# Patient Record
Sex: FEMALE | Employment: FULL TIME | URBAN - METROPOLITAN AREA
[De-identification: names, ages, dates, MRNs, and addresses within clinical notes are randomized per-mention and may not be internally consistent; named-entity substitution may affect disease eponyms.]

---

## 2017-05-08 ENCOUNTER — OFFICE VISIT (OUTPATIENT)
Dept: DERMATOLOGY | Facility: CLINIC | Age: 32
End: 2017-05-08

## 2017-05-08 VITALS — HEART RATE: 66 BPM | SYSTOLIC BLOOD PRESSURE: 123 MMHG | DIASTOLIC BLOOD PRESSURE: 81 MMHG

## 2017-05-08 DIAGNOSIS — L29.9 PRURITUS: ICD-10-CM

## 2017-05-08 DIAGNOSIS — L30.9 DERMATITIS: Primary | ICD-10-CM

## 2017-05-08 ASSESSMENT — PAIN SCALES - GENERAL: PAINLEVEL: NO PAIN (0)

## 2017-05-08 NOTE — MR AVS SNAPSHOT
After Visit Summary   5/8/2017    Susan Locke    MRN: [id number]           Patient Information     Date Of Birth          1985        Visit Information        Provider Department      5/8/2017 2:20 PM Roberta Dang MD [facility] Dermatology        Care Instructions    Wound Care After a Biopsy    What is a skin biopsy?  A skin biopsy allows the doctor to examine a very small piece of tissue under the microscope to determine the diagnosis and the best treatment for the skin condition. A local anesthetic (numbing medicine)  is injected with a very small needle into the skin area to be tested. A small piece of skin is taken from the area. Sometimes a suture (stitch) is used.     What are the risks of a skin biopsy?  I will experience scar, bleeding, swelling, pain, crusting and redness. I may experience incomplete removal or recurrence. Risks of this procedure are excessive bleeding, bruising, infection, nerve damage, numbness, thick (hypertrophic or keloidal) scar and non-diagnostic biopsy.    How should I care for my wound for the first 24 hours?    Keep the wound dry and covered for 24 hours    If it bleeds, hold direct pressure on the area for 15 minutes. If bleeding does not stop then go to the emergency room    Avoid strenuous exercise the first 1-2 days or as your doctor instructs you    How should I care for the wound after 24 hours?    After 24 hours, remove the bandage    You may bathe or shower as normal    If you had a scalp biopsy, you can shampoo as usual and can use shower water to clean the biopsy site daily    Clean the wound twice a day with gentle soap and water    Do not scrub, be gentle    Apply white petroleum/Vaseline after cleaning the wound with a cotton swab or a clean finger, and keep the site covered with a Bandaid /bandage. Bandages are not necessary with a scalp biopsy    If you are unable to cover the site with a Bandaid /bandage, re-apply ointment 2-3  times a day to keep the site moist. Moisture will help with healing    Avoid strenuous activity for first 1-2 days    Avoid lakes, rivers, pools, and oceans until the stitches are removed or the site is healed    How do I clean my wound?    Wash hands for 15 minutes with soap or use hand  before all wound care    Clean the wound with gentle soap and water    Apply white petroleum/Vaseline  to wound after it is clean    Replace the Bandaid /bandage to keep the wound covered for the first few days or as instructed by your doctor    If you had a scalp biopsy, warm shower water to the area on a daily basis should suffice    What should I use to clean my wound?     Cotton-tipped applicators (Qtips )    White petroleum jelly (Vaseline ). Use a clean new container and use Q-tips to apply.    Bandaids   as needed    Gentle soap     How should I care for my wound long term?    Do not get your wound dirty    Keep up with wound care for one week or until the area is healed.    A small scab will form and fall off by itself when the area is completely healed. The area will be red and will become pink in color as it heals. Sun protection is very important for how your scar will turn out. Sunscreen with an SPF 30 or greater is recommended once the area is healed.    If you have stitches, stitches need to be removed in 10 days. You may return to our clinic for this or you may have it done locally at your doctor s office.    You should have some soreness but it should be mild and slowly go away over several days. Talk to your doctor about using tylenol for pain,    When should I call my doctor?  If you have increased:     Pain or swelling    Pus or drainage (clear or slightly yellow drainage is ok)    Temperature over 100F    Spreading redness or warmth around wound    When will I hear about my results?  The biopsy results can take 2-3 weeks to come back. The clinic will call you with the results, send you a mychart  message, or have you schedule a follow-up clinic or phone time to discuss the results. Contact our clinics if you do not hear from us in 3 weeks.     Who should I call with questions?    [facility]: [phone]     [facility]: [phone]    For urgent needs outside of business hours call the [facility] at [phone] and ask for the dermatology resident on call              Follow-ups after your visit        Your next 10 appointments already scheduled     May 19, 2017 10:00 AM CDT   Nurse Visit with  Dermatology Nurse   [facility] Dermatology ([facility])    [address] [place] [id number] [phone]            Rj 15, 2017 10:00 AM CDT   (Arrive by 9:45 AM)   Return Visit with Sheryl Meza MD   [facility] Dermatology ([facility])    [address] [place] [id number] [phone]              Who to contact     Please call your clinic at [phone] to:    Ask questions about your health    Make or cancel appointments    Discuss your medicines    Learn about your test results    Speak to your doctor   If you have compliments or concerns about an experience at your clinic, or if you wish to file a complaint, please contact [facility] Patient Relations at [phone] or email us at [email or web address]         Additional Information About Your Visit        Bangeehart Information     Haversackt gives you secure access to your electronic health record. If you see a primary care provider, you can also send messages to your care team and make appointments. If you have questions, please call your primary care clinic.  If you do not have a primary care provider, please call [phone] and they will assist you.      Equ[employer] is an electronic gateway that provides easy, online access to your medical records.  With Prelert, you can request a clinic appointment, read your test results, renew a prescription or communicate with your care team.     To access your existing account, please contact your [facility] or call [phone] for assistance.        Care EveryWhere ID     This is your Care EveryWhere ID. This could be used by other organizations to access your [place] medical records  [id number]        Your Vitals Were     Pulse                   66            Blood Pressure from Last 3 Encounters:   05/08/17 123/81    Weight from Last 3 Encounters:   No data found for Wt              Today, you had the following     No orders found for display       Primary Care Provider    None Specified       No primary provider on file.        Thank you!     Thank you for choosing [facility] DERMATOLOGY  for your care. Our goal is always to provide you with excellent care. Hearing back from our patients is one way we can continue to improve our services. Please take a few minutes to complete the written survey that you may receive in the mail after your visit with us. Thank you!             Your Updated Medication List - Protect others around you: Learn how to safely use, store and throw away your medicines at www.disposemymeds.org.      Notice  As of 5/8/2017  3:53 PM    You have not been prescribed any medications.

## 2017-05-08 NOTE — NURSING NOTE
Chief Complaint   Patient presents with     Derm Problem     Susan is here today for a check on some itchy dry skin all around her mid section area. Had one spot for a couple years that has since mutiplied into many more areas.     Jeffrey Harris, NICOLAN

## 2017-05-08 NOTE — LETTER
5/8/2017       RE: Susan Locke  [address]  [place] [address]     Dear Colleague,    Thank you for referring your patient, Susan Locke, to the [facility] DERMATOLOG[facility] at [facility]. Please see a copy of my visit note below.    [facility] Dermatology Note      Dermatology Problem List:  1.Dermatitis - etiology unknown  - Considering nummular dermatitis, atopic dermatitis, allergic contact dermatitis, other.  - Biopsy pending 05/08/17.    Encounter Date: May 8, 2017    CC:  Chief Complaint   Patient presents with     Derm Problem     Susan is here today for a check on some itchy dry skin all around her mid section area. Had one spot for a couple years that has since mutiplied into many more areas.         History of Present Illness:  Ms. Susan Locke is a 32 year old female with a history of seasonal allergies who presents to clinic accompanied by her  for evaluation of a 1 year history of pruritic rash. She reports that this started as an isolated lesion of the right flank, described as an oval, pruritic patch. She has applied Aveeno lotion to the area without sufficient relief. At onset, the patient presented to an outside Dermatologist in [facility] (where the patient previously lived) and she was given an unknown topical steroid which appeared to make her erythema and itching worse. Since then, she has gradually developed more oval lesions, ~7 on the right flank that are fairly clustered, 1 on the left flank and 1 on the lower back. The lesions are not tender or painful. Since onset, the patient has been otherwise healthy.    Past Medical History:   There is no problem list on file for this patient.    Past Medical History:   Diagnosis Date     Arthritis      Hay fever      Rheumatism      History reviewed. No pertinent surgical history.    Social History:  The patient works from home in . Recently moved  from [facility] in the past 3 months.     Family History:  Noncontributory.    Medications:  No current outpatient prescriptions on file.     No Known Allergies      Review of Systems:  -Constitutional: The patient denies fatigue, fevers, chills, unintended weight loss, and night sweats.  -HEENT: Patient denies nonhealing oral sores.  -Skin: As above in HPI. No additional skin concerns.    Physical exam:  Vitals: /81 (BP Location: Right arm, Patient Position: Chair, Cuff Size: Adult Regular)  Pulse 66  GEN: This is a well developed, well-nourished female in no acute distress, in a pleasant mood.    SKIN: Focused examination of the trunk was performed.  -On the right flank, there are multiple ~2cm by 3cm poorly demarcated oval skin colored plaques that are slightly darker than the surrounding skin and lichenified with rough texture. Fine overlying scale. There is a singular, similar lesion on the left flank that is slightly more pink in color and measures ~1 by 2 cm. On the lower back, there is a singular ~2 by 3 cm rough, skin colored, plaque without overlying scale.  -No other lesions of concern on areas examined.     Impression/Plan:  1. Dermatitis: Differential includes: nummular dermatitis, atopic dermatitis, allergic contact dermatitis. Etiology is unclear at this time.     Further evaluation with Punch biopsy(with medical student participation):  Site anesthetized with local lidocaine with epinephrine. 4mm punch biopsy obtained from the central portion of the left flank lesion. Placed in formalin immediately. Closed with 1 figure-8, 4-0 prolene suture and 1 simple interrupted suture.  Wound care reviewed.    Follow-up pending biopsy results.         Staff Involved:  Scribed by Indira Shaffer, MS4 for Dr. Dang.        I agree with the PFSH and ROS as completed by the Medical Student. The remainder of the encounter was performed by me and scribed by the Medical Student. The scribed note  accurately reflects my personal services and the medical decisions made by me. The biopsy procedure was done together.       Roberta Dang MD  Professor and Chair  Department of Dermatology  [facility]                Pictures taken of patient today to be placed in chart for future reference.      Again, thank you for allowing me to participate in the care of your patient.      Sincerely,    Roberta Dang MD

## 2017-05-08 NOTE — PATIENT INSTRUCTIONS
Wound Care After a Biopsy    What is a skin biopsy?  A skin biopsy allows the doctor to examine a very small piece of tissue under the microscope to determine the diagnosis and the best treatment for the skin condition. A local anesthetic (numbing medicine)  is injected with a very small needle into the skin area to be tested. A small piece of skin is taken from the area. Sometimes a suture (stitch) is used.     What are the risks of a skin biopsy?  I will experience scar, bleeding, swelling, pain, crusting and redness. I may experience incomplete removal or recurrence. Risks of this procedure are excessive bleeding, bruising, infection, nerve damage, numbness, thick (hypertrophic or keloidal) scar and non-diagnostic biopsy.    How should I care for my wound for the first 24 hours?    Keep the wound dry and covered for 24 hours    If it bleeds, hold direct pressure on the area for 15 minutes. If bleeding does not stop then go to the emergency room    Avoid strenuous exercise the first 1-2 days or as your doctor instructs you    How should I care for the wound after 24 hours?    After 24 hours, remove the bandage    You may bathe or shower as normal    If you had a scalp biopsy, you can shampoo as usual and can use shower water to clean the biopsy site daily    Clean the wound twice a day with gentle soap and water    Do not scrub, be gentle    Apply white petroleum/Vaseline after cleaning the wound with a cotton swab or a clean finger, and keep the site covered with a Bandaid /bandage. Bandages are not necessary with a scalp biopsy    If you are unable to cover the site with a Bandaid /bandage, re-apply ointment 2-3 times a day to keep the site moist. Moisture will help with healing    Avoid strenuous activity for first 1-2 days    Avoid lakes, rivers, pools, and oceans until the stitches are removed or the site is healed    How do I clean my wound?    Wash hands for 15 minutes with soap or use hand  before  all wound care    Clean the wound with gentle soap and water    Apply white petroleum/Vaseline  to wound after it is clean    Replace the Bandaid /bandage to keep the wound covered for the first few days or as instructed by your doctor    If you had a scalp biopsy, warm shower water to the area on a daily basis should suffice    What should I use to clean my wound?     Cotton-tipped applicators (Qtips )    White petroleum jelly (Vaseline ). Use a clean new container and use Q-tips to apply.    Bandaids   as needed    Gentle soap     How should I care for my wound long term?    Do not get your wound dirty    Keep up with wound care for one week or until the area is healed.    A small scab will form and fall off by itself when the area is completely healed. The area will be red and will become pink in color as it heals. Sun protection is very important for how your scar will turn out. Sunscreen with an SPF 30 or greater is recommended once the area is healed.    If you have stitches, stitches need to be removed in 10 days. You may return to our clinic for this or you may have it done locally at your doctor s office.    You should have some soreness but it should be mild and slowly go away over several days. Talk to your doctor about using tylenol for pain,    When should I call my doctor?  If you have increased:     Pain or swelling    Pus or drainage (clear or slightly yellow drainage is ok)    Temperature over 100F    Spreading redness or warmth around wound    When will I hear about my results?  The biopsy results can take 2-3 weeks to come back. The clinic will call you with the results, send you a [employer] message, or have you schedule a follow-up clinic or phone time to discuss the results. Contact our clinics if you do not hear from us in 3 weeks.     Who should I call with questions?    [facility]: [phone]     [facility]: [phone]    For  urgent needs outside of business hours call the [facility] at [phone] and ask for the dermatology resident on call

## 2017-05-08 NOTE — PROGRESS NOTES
[facility] Dermatology Note      Dermatology Problem List:  1.Dermatitis - etiology unknown  - Considering nummular dermatitis, atopic dermatitis, allergic contact dermatitis, other.  - Biopsy pending 05/08/17.    Encounter Date: May 8, 2017    CC:  Chief Complaint   Patient presents with     Derm Problem     Susan is here today for a check on some itchy dry skin all around her mid section area. Had one spot for a couple years that has since mutiplied into many more areas.         History of Present Illness:  Ms. Susan Locke is a 32 year old female with a history of seasonal allergies who presents to clinic accompanied by her  for evaluation of a 1 year history of pruritic rash. She reports that this started as an isolated lesion of the right flank, described as an oval, pruritic patch. She has applied Aveeno lotion to the area without sufficient relief. At onset, the patient presented to an outside Dermatologist in [facility] (where the patient previously lived) and she was given an unknown topical steroid which appeared to make her erythema and itching worse. Since then, she has gradually developed more oval lesions, ~7 on the right flank that are fairly clustered, 1 on the left flank and 1 on the lower back. The lesions are not tender or painful. Since onset, the patient has been otherwise healthy.    Past Medical History:   There is no problem list on file for this patient.    Past Medical History:   Diagnosis Date     Arthritis      Hay fever      Rheumatism      History reviewed. No pertinent surgical history.    Social History:  The patient works from home in . Recently moved from [facility] in the past 3 months.     Family History:  Noncontributory.    Medications:  No current outpatient prescriptions on file.     No Known Allergies      Review of Systems:  -Constitutional: The patient denies fatigue, fevers, chills, unintended weight loss, and night  sweats.  -HEENT: Patient denies nonhealing oral sores.  -Skin: As above in HPI. No additional skin concerns.    Physical exam:  Vitals: /81 (BP Location: Right arm, Patient Position: Chair, Cuff Size: Adult Regular)  Pulse 66  GEN: This is a well developed, well-nourished female in no acute distress, in a pleasant mood.    SKIN: Focused examination of the trunk was performed.  -On the right flank, there are multiple ~2cm by 3cm poorly demarcated oval skin colored plaques that are slightly darker than the surrounding skin and lichenified with rough texture. Fine overlying scale. There is a singular, similar lesion on the left flank that is slightly more pink in color and measures ~1 by 2 cm. On the lower back, there is a singular ~2 by 3 cm rough, skin colored, plaque without overlying scale.  -No other lesions of concern on areas examined.     Impression/Plan:  1. Dermatitis: Differential includes: nummular dermatitis, atopic dermatitis, allergic contact dermatitis. Etiology is unclear at this time.     Further evaluation with Punch biopsy(with medical student participation):  Site anesthetized with local lidocaine with epinephrine. 4mm punch biopsy obtained from the central portion of the left flank lesion. Placed in formalin immediately. Closed with 1 figure-8, 4-0 prolene suture and 1 simple interrupted suture.  Wound care reviewed.    Follow-up pending biopsy results.         Staff Involved:  Scribed by Indira Shaffer, MS4 for Dr. Dang.        I agree with the PFSH and ROS as completed by the Medical Student. The remainder of the encounter was performed by me and scribed by the Medical Student. The scribed note accurately reflects my personal services and the medical decisions made by me. The biopsy procedure was done together.       Roberta Dang MD  Professor and Chair  Department of Dermatology  [facility]

## 2017-05-16 LAB — COPATH REPORT: NORMAL

## 2017-05-19 ENCOUNTER — ALLIED HEALTH/NURSE VISIT (OUTPATIENT)
Dept: DERMATOLOGY | Facility: CLINIC | Age: 32
End: 2017-05-19

## 2017-05-19 ENCOUNTER — TELEPHONE (OUTPATIENT)
Dept: DERMATOLOGY | Facility: CLINIC | Age: 32
End: 2017-05-19

## 2017-05-19 DIAGNOSIS — L30.9 DERMATITIS: Primary | ICD-10-CM

## 2017-05-19 DIAGNOSIS — Z48.02 VISIT FOR SUTURE REMOVAL: Primary | ICD-10-CM

## 2017-05-19 RX ORDER — DESOXIMETASONE 2.5 MG/G
OINTMENT TOPICAL
Qty: 60 G | Refills: 3 | Status: SHIPPED | OUTPATIENT
Start: 2017-05-19 | End: 2017-09-13

## 2017-05-19 NOTE — MR AVS SNAPSHOT
After Visit Summary   5/19/2017    Susan Locke    MRN: [id number]           Patient Information     Date Of Birth          1985        Visit Information        Provider Department      5/19/2017 10:00 AM Nurse, [facility] Dermatology        Today's Diagnoses     Visit for suture removal    -  1       Follow-ups after your visit        Your next 10 appointments already scheduled     Jun 01, 2017  8:00 AM CDT   New Patient Visit with Aparna Meza MD   [facility] ([facility])    [facility] [address]  [address]  [facility] [id number] [phone]            Rj 15, 2017 10:00 AM CDT   (Arrive by 9:45 AM)   Return Visit with Sheryl Meza MD   [facility] Dermatology ([facility])    [address] [facility]  [address] [facility] [address] [phone]              Who to contact     Please call your clinic at [phone] to:    Ask questions about your health    Make or cancel appointments    Discuss your medicines    Learn about your test results    Speak to your doctor   If you have compliments or concerns about an experience at your clinic, or if you wish to file a complaint, please contact [facility] Patient Relations at [phone] or email us at [email or web address]         Additional Information About Your Visit        MyChart Information     [employer] gives you secure access to your electronic health record. If you see a primary care provider, you can also send messages to your care team and make appointments. If you have questions, please call your primary care clinic.  If you do not have a primary care provider, please call [phone] and they will assist you.      OnGreen is an electronic gateway that provides easy, online access to your medical records. With OnGreen, you can request a clinic appointment, read your test results,  renew a prescription or communicate with your care team.     To access your existing account, please contact your [facility] or call [phone] for assistance.        Care EveryWhere ID     This is your Care EveryWhere ID. This could be used by other organizations to access your [place] medical records  [id number]         Blood Pressure from Last 3 Encounters:   05/08/17 123/81    Weight from Last 3 Encounters:   No data found for Wt              Today, you had the following     No orders found for display       Primary Care Provider    None Specified       No primary provider on file.        Thank you!     Thank you for choosing [facility] DERMATOLOGY  for your care. Our goal is always to provide you with excellent care. Hearing back from our patients is one way we can continue to improve our services. Please take a few minutes to complete the written survey that you may receive in the mail after your visit with us. Thank you!             Your Updated Medication List - Protect others around you: Learn how to safely use, store and throw away your medicines at www.disposemymeds.org.      Notice  As of 5/19/2017 11:32 AM    You have not been prescribed any medications.

## 2017-05-19 NOTE — TELEPHONE ENCOUNTER
Reached Susan by phone and advised that spongiotic dermatitis is a reaction pattern seen with many types of inflammation, but most commonly atopic dermatis (inborn defect of the skin barrier, usually presents in childhood), allergic contact dermatitis (possibly due to myriad potential substances she comes into contact with, but often with a distribution on her skin that would be provide clues).     She reports no itchy or red areas (only brown areas that i explained likely represent postinflammatory hyperpigmentation), and attributes her interval improvement to avoiding bread and sugar in her diet.    I explained that a food allergy is unlikely to manifest in this way (without other symptoms), but this it is OK to continue experimenting.  Given her worsening with a topical steroid in the past, have Rx'd desoximetasone 0.25% oitnment to be applied twice daily to all affected trunk/extremity areas, which she will fill if she re-flares before her next visit. I advised that she purchase a thick, ceramide-containing moisturizer (e.g. Cerave, Eucerin) to help restore the skin barrier that becomes defective with chronic inflammation/scratching.    Follow-up: 6/15 (Susana MCLEOD).    Ze Torres MD, ROLO  PGY-4, Dermatology

## 2017-05-19 NOTE — NURSING NOTE
Dermatology Suture Removal Record  S: Susan presents to the clinic today for  removal of sutures.  The patient has had the sutures in place for 11 days.    There has been no history of infection or drainage.    O: 3 sutures are seen located on the L. Lateral flank.  The wound is healing well with no signs of infection.       A: Suture removal.    P:  All sutures were removed today. Patient experienced some pain with removal. Vaseline and a bandaid placed over area where suture was imbedded in skin. To be changed daily until area heals. Routine wound care discussed. Patient states understanding of s/s of infection and will follow up with any concerns.       Patient noted she received her biopsy results though my chart but has not heard any recommendations and does not understand exactly what it is. Message will be sent to CLARA in providers absence for follow up regarding results.

## 2017-05-19 NOTE — TELEPHONE ENCOUNTER
Patient was in for suture removal from biopsy site she had done on 5/8. She mentioned she received her biopsy results through [facility] but has no recommendations and does not know what the results mean. She would like some clarification today as she states she was told she would be have results by now.     Routing to [facility] for assistance in providers absence. Patient can be reached at number on file.

## 2017-06-01 ENCOUNTER — OFFICE VISIT (OUTPATIENT)
Dept: OBGYN | Facility: CLINIC | Age: 32
End: 2017-06-01
Attending: OBSTETRICS & GYNECOLOGY
Payer: COMMERCIAL

## 2017-06-01 VITALS
SYSTOLIC BLOOD PRESSURE: 118 MMHG | HEIGHT: 67 IN | DIASTOLIC BLOOD PRESSURE: 68 MMHG | BODY MASS INDEX: 25.27 KG/M2 | HEART RATE: 72 BPM | WEIGHT: 161 LBS

## 2017-06-01 DIAGNOSIS — Z31.9 PATIENT DESIRES PREGNANCY: Primary | ICD-10-CM

## 2017-06-01 PROCEDURE — 87340 HEPATITIS B SURFACE AG IA: CPT | Performed by: OBSTETRICS & GYNECOLOGY

## 2017-06-01 PROCEDURE — 86762 RUBELLA ANTIBODY: CPT | Performed by: OBSTETRICS & GYNECOLOGY

## 2017-06-01 PROCEDURE — 36415 COLL VENOUS BLD VENIPUNCTURE: CPT | Performed by: OBSTETRICS & GYNECOLOGY

## 2017-06-01 PROCEDURE — 86706 HEP B SURFACE ANTIBODY: CPT | Performed by: OBSTETRICS & GYNECOLOGY

## 2017-06-01 ASSESSMENT — ENCOUNTER SYMPTOMS
JAUNDICE: 0
SKIN CHANGES: 0
HEARTBURN: 0
PANIC: 0
DIARRHEA: 0
NAUSEA: 0
BLOATING: 0
RECTAL BLEEDING: 0
BLOOD IN STOOL: 0
DEPRESSION: 0
NERVOUS/ANXIOUS: 1
POOR WOUND HEALING: 0
RECTAL PAIN: 0
CONSTIPATION: 1
VOMITING: 0
BOWEL INCONTINENCE: 0
INSOMNIA: 0
ABDOMINAL PAIN: 0
DECREASED CONCENTRATION: 1
NAIL CHANGES: 1

## 2017-06-01 ASSESSMENT — ANXIETY QUESTIONNAIRES
GAD7 TOTAL SCORE: 0
GAD7 TOTAL SCORE: 5
7. FEELING AFRAID AS IF SOMETHING AWFUL MIGHT HAPPEN: SEVERAL DAYS
4. TROUBLE RELAXING: NOT AT ALL
7. FEELING AFRAID AS IF SOMETHING AWFUL MIGHT HAPPEN: SEVERAL DAYS
2. NOT BEING ABLE TO STOP OR CONTROL WORRYING: SEVERAL DAYS
1. FEELING NERVOUS, ANXIOUS, OR ON EDGE: SEVERAL DAYS
GAD7 TOTAL SCORE: 0
6. BECOMING EASILY ANNOYED OR IRRITABLE: SEVERAL DAYS
3. WORRYING TOO MUCH ABOUT DIFFERENT THINGS: SEVERAL DAYS
5. BEING SO RESTLESS THAT IT IS HARD TO SIT STILL: NOT AT ALL

## 2017-06-01 NOTE — MR AVS SNAPSHOT
After Visit Summary   6/1/2017    Susan Locke    MRN: [id number]           Patient Information     Date Of Birth          1985        Visit Information        Provider Department      6/1/2017 8:00 AM Aparna Meza MD [facility]        Today's Diagnoses     Patient desires pregnancy    -  1       Follow-ups after your visit        Your next 10 appointments already scheduled     Rj 15, 2017 10:00 AM CDT   (Arrive by 9:45 AM)   Return Visit with Sheryl Meza MD   [facility] ([facility])    [address] [place] [id number] [phone]              Who to contact     Please call your clinic at [phone] to:    Ask questions about your health    Make or cancel appointments    Discuss your medicines    Learn about your test results    Speak to your doctor   If you have compliments or concerns about an experience at your clinic, or if you wish to file a complaint, please contact [facility] Patient Relations at [phone] or email us at [email or web address]         Additional Information About Your Visit        MyChart Information     INFOGRAPHIQSt gives you secure access to your electronic health record. If you see a primary care provider, you can also send messages to your care team and make appointments. If you have questions, please call your primary care clinic.  If you do not have a primary care provider, please call [phone] and they will assist you.      INFOGRAPHIQSt is an electronic gateway that provides easy, online access to your medical records. With Endoart, you can request a clinic appointment, read your test results, renew a prescription or communicate with your care team.     To access your existing account, please contact your [facility] Clinic or call [phone] for assistance.        Care EveryWhere ID     This is your Care  "EveryWhere ID. This could be used by other organizations to access your [place] medical records  [id number]        Your Vitals Were     Pulse Height Last Period BMI (Body Mass Index)          72 1.702 m (5' 7\") 05/21/2017 (Exact Date) 25.22 kg/m2         Blood Pressure from Last 3 Encounters:   06/01/17 118/68   05/08/17 123/81    Weight from Last 3 Encounters:   06/01/17 73 kg (161 lb)              We Performed the Following     Hepatitis B Surface Antibody     Hepatitis B surface antigen     Rubella Antibody IgG Quantitative        Primary Care Provider    None Specified       No primary provider on file.        Thank you!     Thank you for choosing [facility]  for your care. Our goal is always to provide you with excellent care. Hearing back from our patients is one way we can continue to improve our services. Please take a few minutes to complete the written survey that you may receive in the mail after your visit with us. Thank you!             Your Updated Medication List - Protect others around you: Learn how to safely use, store and throw away your medicines at www.disposemymeds.org.          This list is accurate as of: 6/1/17 11:59 PM.  Always use your most recent med list.                   Brand Name Dispense Instructions for use    CERAVE Crea     340 g    Apply once daily to the trunk and extremities to damp skin (after showering and patting dry, not rubbing).       desoximetasone 0.25 % Oint ointment    TOPICORT    60 g    Apply twice daily to itchy/red areas on the trunk/extremities.         "

## 2017-06-01 NOTE — LETTER
6/1/2017       RE: Susan Locke  [address]  [facility] [place] [facility] [address]     Dear Colleague,    Thank you for referring your patient, Susan Locke, to the [facility] at [facility]. Please see a copy of my visit note below.    Gynecology Visit Note  6/1/17    Reason for visit: Pre-conception counseling    HPI: Patient is a 31 yo nulligravid female who presents today for discussion of planning for pregnancy.  In general, patient states she is pretty healthy with no major medical issues.  She has had issues with occasional right lower quadrant pain in the past with multiple different CT scans and US completed in past without ever finding a structural cause for her pain.  She does sometimes struggle with constipation and knows that pregnancy can sometimes cause further constipation so she is slightly concerned about that.    She is accompanied by her  Walter today who also states he is in good health with no major medical issues, has never fathered another pregnancy in prior relationships.  No history of chromosomal anomalies on his side of the family.    Past OB/GYN History:  Nulligravid  Menses: LMP 5/21/17.  Regular cycles every 32 days, bleeds for 4-5 days, first day with some dysmenorrhea but takes no meds for this, denies intermenstrual bleeding  Sexually active with   Using condoms for contraception  No STI history  No dyspareunia or concerning discharge    Past Medical History:  None    Past Surgical History:  None    Medications:  PNV x 1 month    Allergies:  Seasonal    Social History:  No t/e/d  Moved to [place] 3 months ago from [place] and likes it here so far    Family History:  No breast/colon/ovarian/uterine cancer  No bleeding/clotting disorders  No infertility issues    ROS:  Answers for HPI/ROS submitted by the patient on 6/1/2017     O:  Vitals:    06/01/17 0805   BP: 118/68   Pulse: 72   Weight: 73 kg (161 lb)   Height:  "1.702 m (5' 7\")     General: NAD, A&Ox3  Resp: Non-labored breathing    A/P: 31 yo nulligravid female presents for pre-conception counseling  1) Preconception counseling: Discussed with patient that given her negative medical history overall, low risk for attempting pregnancy.  Already taking prenatal vitamin and encouraged continued use.  Patient unsure of immunization status and so we discussed looking for immunity for Hepatitis B and Rubella today and she desires to have this completed.  Will notify of results and if need to have any vaccines prior to attempting pregnancy.  Discussed with patient how to time intercourse with ovulation and discussed trying for 1 year to conceive prior to being concerned about fertility.  Patient understands this.  Discussed if begins trying and gets positive pregnancy test, recommend calling into clinic so we can arrange appropriate prenatal care for her.      Aparna Meza MD  "

## 2017-06-02 LAB
HBV SURFACE AB SERPL IA-ACNC: 363.85 M[IU]/ML
HBV SURFACE AG SERPL QL IA: NONREACTIVE
RUBV IGG SERPL IA-ACNC: 46 IU/ML

## 2017-06-04 NOTE — PROGRESS NOTES
Gynecology Visit Note  6/1/17    Reason for visit: Pre-conception counseling    HPI: Patient is a 33 yo nulligravid female who presents today for discussion of planning for pregnancy.  In general, patient states she is pretty healthy with no major medical issues.  She has had issues with occasional right lower quadrant pain in the past with multiple different CT scans and US completed in past without ever finding a structural cause for her pain.  She does sometimes struggle with constipation and knows that pregnancy can sometimes cause further constipation so she is slightly concerned about that.    She is accompanied by her  Walter today who also states he is in good health with no major medical issues, has never fathered another pregnancy in prior relationships.  No history of chromosomal anomalies on his side of the family.    Past OB/GYN History:  Nulligravid  Menses: LMP 5/21/17.  Regular cycles every 32 days, bleeds for 4-5 days, first day with some dysmenorrhea but takes no meds for this, denies intermenstrual bleeding  Sexually active with   Using condoms for contraception  No STI history  No dyspareunia or concerning discharge    Past Medical History:  None    Past Surgical History:  None    Medications:  PNV x 1 month    Allergies:  Seasonal    Social History:  No t/e/d  Moved to [place] 3 months ago from [place] and likes it here so far    Family History:  No breast/colon/ovarian/uterine cancer  No bleeding/clotting disorders  No infertility issues    ROS:  Answers for HPI/ROS submitted by the patient on 6/1/2017   JOVAN 7 TOTAL SCORE: 0  PHQ-2 Score: 0  General Symptoms: No  Skin Symptoms: Yes  HENT Symptoms: No  EYE SYMPTOMS: No  HEART SYMPTOMS: No  LUNG SYMPTOMS: No  INTESTINAL SYMPTOMS: Yes  URINARY SYMPTOMS: No  GYNECOLOGIC SYMPTOMS: No  BREAST SYMPTOMS: No  SKELETAL SYMPTOMS: No  BLOOD SYMPTOMS: No  NERVOUS SYSTEM SYMPTOMS: No  MENTAL HEALTH SYMPTOMS: Yes  Changes in hair: No  Changes in  "moles/birth marks: No  Itching: No  Rashes: Yes  Changes in nails: Yes  Acne: No  Hair in places you don't want it: No  Change in facial hair: No  Warts: No  Non-healing sores: No  Scarring: No  Flaking of skin: No  Color changes of hands/feet in cold : Yes  Sun sensitivity: No  Skin thickening: No  Heart burn or indigestion: No  Nausea: No  Vomiting: No  Abdominal pain: No  Bloating: No  Constipation: Yes  Diarrhea: No  Blood in stool: No  Black stools: No  Rectal or Anal pain: No  Fecal incontinence: No  Rectal bleeding: No  Yellowing of skin or eyes: No  Vomit with blood: No  Change in stools: Yes  Hemorrhoids: Yes  Nervous or Anxious: Yes  Depression: No  Trouble sleeping: No  Trouble thinking or concentrating: Yes  Mood changes: Yes  Panic attacks: No    O:  Vitals:    06/01/17 0805   BP: 118/68   Pulse: 72   Weight: 73 kg (161 lb)   Height: 1.702 m (5' 7\")     General: NAD, A&Ox3  Resp: Non-labored breathing    A/P: 31 yo nulligravid female presents for pre-conception counseling  1) Preconception counseling: Discussed with patient that given her negative medical history overall, low risk for attempting pregnancy.  Already taking prenatal vitamin and encouraged continued use.  Patient unsure of immunization status and so we discussed looking for immunity for Hepatitis B and Rubella today and she desires to have this completed.  Will notify of results and if need to have any vaccines prior to attempting pregnancy.  Discussed with patient how to time intercourse with ovulation and discussed trying for 1 year to conceive prior to being concerned about fertility.  Patient understands this.  Discussed if begins trying and gets positive pregnancy test, recommend calling into clinic so we can arrange appropriate prenatal care for her.      Aparna Meza MD  "

## 2017-06-10 ENCOUNTER — TELEPHONE (OUTPATIENT)
Dept: DERMATOLOGY | Facility: CLINIC | Age: 32
End: 2017-06-10

## 2017-06-10 NOTE — TELEPHONE ENCOUNTER
A request was made to Char in the Department to arrange a follow-up call with Susan. This was done today. Susan reports she is doing well and relates this to discontinuing eating bread and sugar. She is not using any type of topical steroid but does report she is using a moisturizer regularly. She reports she now has only hyperpigmented patches in the areas of previous involvement. She wonders if she needs to be seen this coming week.    I recommended that if she thought her skin issue had resolved she could call and cancel the appointment. I also suggested that if she felt her skin disease was under excellent control she could try introducing bread into her diet and seeing if this was associated with an exacerbation of her rash. Susan said she would think about this.    Roberta Dang MD

## 2017-06-12 PROBLEM — L29.9 PRURITUS: Status: ACTIVE | Noted: 2017-06-12

## 2017-06-12 PROBLEM — L30.9 DERMATITIS: Status: ACTIVE | Noted: 2017-06-12

## 2017-06-26 ENCOUNTER — TELEPHONE (OUTPATIENT)
Dept: OBGYN | Facility: CLINIC | Age: 32
End: 2017-06-26

## 2017-06-26 DIAGNOSIS — O20.0 THREATENED ABORTION IN FIRST TRIMESTER: ICD-10-CM

## 2017-06-26 DIAGNOSIS — O20.0 THREATENED ABORTION IN FIRST TRIMESTER: Primary | ICD-10-CM

## 2017-06-26 PROBLEM — O20.9 BLEEDING IN EARLY PREGNANCY: Status: ACTIVE | Noted: 2017-06-26

## 2017-06-26 LAB
ABO + RH BLD: NORMAL
ABO + RH BLD: NORMAL
B-HCG SERPL-ACNC: <1 IU/L (ref 0–5)
BLD GP AB SCN SERPL QL: NORMAL
BLOOD BANK CMNT PATIENT-IMP: NORMAL
SPECIMEN EXP DATE BLD: NORMAL

## 2017-06-26 PROCEDURE — 86850 RBC ANTIBODY SCREEN: CPT | Performed by: ADVANCED PRACTICE MIDWIFE

## 2017-06-26 PROCEDURE — 86901 BLOOD TYPING SEROLOGIC RH(D): CPT | Performed by: ADVANCED PRACTICE MIDWIFE

## 2017-06-26 PROCEDURE — 86900 BLOOD TYPING SEROLOGIC ABO: CPT | Performed by: ADVANCED PRACTICE MIDWIFE

## 2017-06-26 PROCEDURE — 36415 COLL VENOUS BLD VENIPUNCTURE: CPT | Performed by: ADVANCED PRACTICE MIDWIFE

## 2017-06-26 PROCEDURE — 84702 CHORIONIC GONADOTROPIN TEST: CPT | Performed by: ADVANCED PRACTICE MIDWIFE

## 2017-06-26 NOTE — TELEPHONE ENCOUNTER
Received call from Susan stating her LMP was 5/20/17 (making her 5 1/7 weeks pregnant today). On June 20 she had one day of heavy bleeding with clots and then it stopped - pregnancy test that day was positive. She took a pregnancy test today that was initially negative but after 30 minutes it was positive. She reports she is O+ blood type.    Advised can do serial bhcg per protocol and confirm blood type. She agreed with plan. Provided bleeding precautions.

## 2017-08-22 ENCOUNTER — TELEPHONE (OUTPATIENT)
Dept: OBGYN | Facility: CLINIC | Age: 32
End: 2017-08-22

## 2017-08-22 DIAGNOSIS — O20.9 BLEEDING IN EARLY PREGNANCY: Primary | ICD-10-CM

## 2017-08-22 NOTE — TELEPHONE ENCOUNTER
Patient called and would like to establish prenatal care   Patient LMP 7/14/17  Patient G2 P  Patient complains of having nausea   Patient is taking prenatal vitamins.

## 2017-09-13 ENCOUNTER — OFFICE VISIT (OUTPATIENT)
Dept: OBGYN | Facility: CLINIC | Age: 32
End: 2017-09-13
Attending: ADVANCED PRACTICE MIDWIFE
Payer: COMMERCIAL

## 2017-09-13 VITALS
DIASTOLIC BLOOD PRESSURE: 81 MMHG | SYSTOLIC BLOOD PRESSURE: 124 MMHG | HEIGHT: 67 IN | HEART RATE: 83 BPM | WEIGHT: 158.7 LBS | BODY MASS INDEX: 24.91 KG/M2

## 2017-09-13 DIAGNOSIS — Z34.01 ENCOUNTER FOR SUPERVISION OF NORMAL FIRST PREGNANCY IN FIRST TRIMESTER: Primary | ICD-10-CM

## 2017-09-13 DIAGNOSIS — O20.9 BLEEDING IN EARLY PREGNANCY: ICD-10-CM

## 2017-09-13 LAB
ABO + RH BLD: NORMAL
ABO + RH BLD: NORMAL
BASOPHILS # BLD AUTO: 0 10E9/L (ref 0–0.2)
BASOPHILS NFR BLD AUTO: 0.1 %
BLD GP AB SCN SERPL QL: NORMAL
BLOOD BANK CMNT PATIENT-IMP: NORMAL
DEPRECATED CALCIDIOL+CALCIFEROL SERPL-MC: 20 UG/L (ref 20–75)
DIFFERENTIAL METHOD BLD: NORMAL
EOSINOPHIL # BLD AUTO: 0.3 10E9/L (ref 0–0.7)
EOSINOPHIL NFR BLD AUTO: 4.1 %
ERYTHROCYTE [DISTWIDTH] IN BLOOD BY AUTOMATED COUNT: 13.1 % (ref 10–15)
HBV SURFACE AG SERPL QL IA: NONREACTIVE
HCT VFR BLD AUTO: 40.4 % (ref 35–47)
HGB BLD-MCNC: 14 G/DL (ref 11.7–15.7)
HIV 1+2 AB+HIV1 P24 AG SERPL QL IA: NONREACTIVE
IMM GRANULOCYTES # BLD: 0 10E9/L (ref 0–0.4)
IMM GRANULOCYTES NFR BLD: 0.1 %
LYMPHOCYTES # BLD AUTO: 1.4 10E9/L (ref 0.8–5.3)
LYMPHOCYTES NFR BLD AUTO: 20.2 %
MCH RBC QN AUTO: 32 PG (ref 26.5–33)
MCHC RBC AUTO-ENTMCNC: 34.7 G/DL (ref 31.5–36.5)
MCV RBC AUTO: 92 FL (ref 78–100)
MONOCYTES # BLD AUTO: 0.7 10E9/L (ref 0–1.3)
MONOCYTES NFR BLD AUTO: 10 %
NEUTROPHILS # BLD AUTO: 4.6 10E9/L (ref 1.6–8.3)
NEUTROPHILS NFR BLD AUTO: 65.5 %
NRBC # BLD AUTO: 0 10*3/UL
NRBC BLD AUTO-RTO: 0 /100
PLATELET # BLD AUTO: 234 10E9/L (ref 150–450)
RBC # BLD AUTO: 4.38 10E12/L (ref 3.8–5.2)
RUBV IGG SERPL IA-ACNC: 38 IU/ML
SPECIMEN EXP DATE BLD: NORMAL
T PALLIDUM IGG+IGM SER QL: NEGATIVE
VZV IGG SER QL IA: 2.5 AI (ref 0–0.8)
WBC # BLD AUTO: 7.1 10E9/L (ref 4–11)

## 2017-09-13 PROCEDURE — 87086 URINE CULTURE/COLONY COUNT: CPT | Performed by: ADVANCED PRACTICE MIDWIFE

## 2017-09-13 PROCEDURE — 36415 COLL VENOUS BLD VENIPUNCTURE: CPT | Performed by: ADVANCED PRACTICE MIDWIFE

## 2017-09-13 PROCEDURE — 87389 HIV-1 AG W/HIV-1&-2 AB AG IA: CPT | Performed by: ADVANCED PRACTICE MIDWIFE

## 2017-09-13 PROCEDURE — 76817 TRANSVAGINAL US OBSTETRIC: CPT | Mod: ZF

## 2017-09-13 PROCEDURE — 99212 OFFICE O/P EST SF 10 MIN: CPT | Mod: 25,ZF

## 2017-09-13 PROCEDURE — 87340 HEPATITIS B SURFACE AG IA: CPT | Performed by: ADVANCED PRACTICE MIDWIFE

## 2017-09-13 PROCEDURE — 86762 RUBELLA ANTIBODY: CPT | Performed by: ADVANCED PRACTICE MIDWIFE

## 2017-09-13 PROCEDURE — 86901 BLOOD TYPING SEROLOGIC RH(D): CPT | Performed by: ADVANCED PRACTICE MIDWIFE

## 2017-09-13 PROCEDURE — 86780 TREPONEMA PALLIDUM: CPT | Performed by: ADVANCED PRACTICE MIDWIFE

## 2017-09-13 PROCEDURE — 86850 RBC ANTIBODY SCREEN: CPT | Performed by: ADVANCED PRACTICE MIDWIFE

## 2017-09-13 PROCEDURE — 82306 VITAMIN D 25 HYDROXY: CPT | Performed by: ADVANCED PRACTICE MIDWIFE

## 2017-09-13 PROCEDURE — 85025 COMPLETE CBC W/AUTO DIFF WBC: CPT | Performed by: ADVANCED PRACTICE MIDWIFE

## 2017-09-13 PROCEDURE — 86900 BLOOD TYPING SEROLOGIC ABO: CPT | Performed by: ADVANCED PRACTICE MIDWIFE

## 2017-09-13 PROCEDURE — 86787 VARICELLA-ZOSTER ANTIBODY: CPT | Performed by: ADVANCED PRACTICE MIDWIFE

## 2017-09-13 ASSESSMENT — PAIN SCALES - GENERAL: PAINLEVEL: NO PAIN (0)

## 2017-09-13 NOTE — MR AVS SNAPSHOT
After Visit Summary   9/13/2017    Susan Locke    MRN: [id number]           Patient Information     Date Of Birth          1985        Visit Information        Provider Department      9/13/2017 8:30 AM [facility] ULTRASOUND [facility]        Today's Diagnoses     Bleeding in early pregnancy           Follow-ups after your visit        Your next 10 appointments already scheduled     Sep 27, 2017  8:15 AM CDT   NEW OB with Vivienne Ahn CNM   [facility] ([facility])    [facility]  [address]  [address]  [facility] [id number] [phone]              Who to contact     Please call your clinic at [phone] to:    Ask questions about your health    Make or cancel appointments    Discuss your medicines    Learn about your test results    Speak to your doctor   If you have compliments or concerns about an experience at your clinic, or if you wish to file a complaint, please contact [facility] Patient Relations at [phone] or email us at [email or web address]         Additional Information About Your Visit        MyChart Information     [employer] biopharmat gives you secure access to your electronic health record. If you see a primary care provider, you can also send messages to your care team and make appointments. If you have questions, please call your primary care clinic.  If you do not have a primary care provider, please call [phone] and they will assist you.      iMPath Networkshart is an electronic gateway that provides easy, online access to your medical records. With SceneShot, you can request a clinic appointment, read your test results, renew a prescription or communicate with your care team.     To access your existing account, please contact your [facility] or call [phone] for assistance.        Care EveryWhere ID     This  is your Care EveryWhere ID. This could be used by other organizations to access your [employer] medical records  [id number]        Your Vitals Were     Last Period                   07/19/2017 (Exact Date)            Blood Pressure from Last 3 Encounters:   09/13/17 124/81   06/01/17 118/68   05/08/17 123/81    Weight from Last 3 Encounters:   09/13/17 72 kg (158 lb 11.2 oz)   06/01/17 73 kg (161 lb)              We Performed the Following     Dating ultrasound less than 14 weeks gestation Transvag  - [id number] (In Clinic)        Primary Care Provider    None Specified       No primary provider on file.        Equal Access to Services     [facility]: Hadii delio Peres, sowmya gutierrez, ashely murray, amy field . So [facility] [phone].    ATENCIÓN: Si habla español, tiene a carrera disposición servicios gratuitos de asistencia lingüística. Llame al [phone].    We comply with applicable federal civil rights laws and [place] laws. We do not discriminate on the basis of race, color, national origin, age, disability sex, sexual orientation or gender identity.            Thank you!     Thank you for choosing [facility]  for your care. Our goal is always to provide you with excellent care. Hearing back from our patients is one way we can continue to improve our services. Please take a few minutes to complete the written survey that you may receive in the mail after your visit with us. Thank you!             Your Updated Medication List - Protect others around you: Learn how to safely use, store and throw away your medicines at www.disposemymeds.org.          This list is accurate as of: 9/13/17  1:49 PM.  Always use your most recent med list.                   Brand Name Dispense Instructions for use Diagnosis    CERAVE Crea     340 g    Apply once daily to the trunk and extremities to damp skin (after showering and patting dry, not rubbing).     Dermatitis       PRENATAL VITAMIN PO

## 2017-09-13 NOTE — LETTER
2017       RE: Susan Locke  [address]  [place] [facility] [place] [address]     Dear Colleague,    Thank you for referring your patient, Susan Locke, to the [facility] at [facility]. Please see a copy of my visit note below.    Subjective:  32 year old female who presents to clinic for initiation of OB care.   at 8w0d with estimated date of delivery of 2018 based on LMP.  Pregnancy is planned.  Symptoms since LMP include nausea and vomiting.  Patient has tried these relief measures: diet modification and increased rest.  Co-morbids: arthritis, no meds  Medications: PNV  Reviewed dating ultrasound.     PERSONAL/SOCIAL HISTORY  Lives with their spouse, Walter.  x 12 years.  Employment: Works from home.  Her job involves sedentary activity .  Additional items: None    Objective  -VS: reviewed and within normal limits   -General appearance: no acute distress, patient is comfortable   NEUROLOGICAL/PSYCHIATRIC   - Orientated x3,   - Mood and affect: normal   Genetic/Infection questionnaire completed, risks include none.    Assessment/Plan   at 8w0d  by Patient's last menstrual period was 2017 (exact date). and US confirms.  Encounter Diagnosis   Name Primary?     Encounter for supervision of normal first pregnancy in first trimester Yes     Orders Placed This Encounter   Procedures     25- OH-Vitamin D     Anti Treponema     CBC with Platelets Differential     Hepatitis B Surface Antigen     HIV Antigen Antibody Combo     Rubella Antibody IgG Quantitative     Varicella Zoster Virus Antibody IgG     ABO/Rh Type and Screen     Orders Placed This Encounter   Medications     Prenatal Vit-Fe Fumarate-FA (PRENATAL VITAMIN PO)     - Oriented to Practice, types of care, and how to reach a provider. Desires CN care.  - Reviewed small, frequent meals, frequent snacking and increased PO fluids. Discussed medication options for  treatment of NVP, pt will try Vitamin B6 without unisom at this time.   - Patient received 1st trimester new OB education packet complete with aide of The Expectant Family booklet including information on genetic screening test options.    - Patient would like to disucss options further at new OB visit.  - Patient was encouraged to start prenatal vitamins as tolerated.    - Patient was sent to lab for routine OB labs including varicella.    - Pregnancy concerns to be addressed by provider at new OB exam include: nausea/vomiting follow-up, needs pap and GC/CT at next visit, checking with insurance regarding genetic screening.  - Patient instructed to schedule new OB exam with provider in 2 weeks.      Again, thank you for allowing me to participate in the care of your patient.      Sincerely,    LEANDRA Pugh CNM

## 2017-09-13 NOTE — PROGRESS NOTES
Subjective:  32 year old female who presents to clinic for initiation of OB care.   at 8w0d with estimated date of delivery of 2018 based on LMP.  Pregnancy is planned.  Symptoms since LMP include nausea and vomiting.  Patient has tried these relief measures: diet modification and increased rest.  Co-morbids: arthritis, no meds  Medications: PNV  Reviewed dating ultrasound.     PERSONAL/SOCIAL HISTORY  Lives with their spouse, Walter.  x 12 years.  Employment: Works from home.  Her job involves sedentary activity .  Additional items: None    Objective  -VS: reviewed and within normal limits   -General appearance: no acute distress, patient is comfortable   NEUROLOGICAL/PSYCHIATRIC   - Orientated x3,   - Mood and affect: normal   Genetic/Infection questionnaire completed, risks include none.    Assessment/Plan   at 8w0d  by Patient's last menstrual period was 2017 (exact date). and US confirms.  Encounter Diagnosis   Name Primary?     Encounter for supervision of normal first pregnancy in first trimester Yes     Orders Placed This Encounter   Procedures     25- OH-Vitamin D     Anti Treponema     CBC with Platelets Differential     Hepatitis B Surface Antigen     HIV Antigen Antibody Combo     Rubella Antibody IgG Quantitative     Varicella Zoster Virus Antibody IgG     ABO/Rh Type and Screen     Orders Placed This Encounter   Medications     Prenatal Vit-Fe Fumarate-FA (PRENATAL VITAMIN PO)     - Oriented to Practice, types of care, and how to reach a provider. Desires CNM care.  - Reviewed small, frequent meals, frequent snacking and increased PO fluids. Discussed medication options for treatment of NVP, pt will try Vitamin B6 without unisom at this time.   - Patient received 1st trimester new OB education packet complete with aide of The Expectant Family booklet including information on genetic screening test options.    - Patient would like to disucss options further at new OB  visit.  - Patient was encouraged to start prenatal vitamins as tolerated.    - Patient was sent to lab for routine OB labs including varicella.    - Pregnancy concerns to be addressed by provider at new OB exam include: nausea/vomiting follow-up, needs pap and GC/CT at next visit, checking with insurance regarding genetic screening.  - Patient instructed to schedule new OB exam with provider in 2 weeks.

## 2017-09-13 NOTE — LETTER
2017       RE: Susan Locke  [address]  [facility] [place] [address]     Dear Colleague,    Thank you for referring your patient, Susan Locke, to the [facility] at [facility]. Please see a copy of my visit note below.    32 year old female, ., with LMP 2017, 8 0/7 weeks. Estimated Date of Delivery: 2018,  presents for confirmation of dates and assessment of viability. This study was done transvaginally.    Measurements     CRL = 15.6 mm = 8 0/7 weeks  EGA.        Fetal anatomy appears normal for gestational age.     Fetal/Fetal Cardiac Activity: Present.  FHR = 167bpm.     Implantation: Intrauterine.     Cervix = 4.1 cm      Maternal structures appear normal.    Impression: Single viable intrauterine pregnancy at 8+0 weeks.     Recommend comprehensive scan at 18 to 20 weeks.    ROSALVA Wills MD        Again, thank you for allowing me to participate in the care of your patient.      Sincerely,    [facility]

## 2017-09-13 NOTE — NURSING NOTE
Chief Complaint   Patient presents with     Prenatal Care     Intake 8w0d       See KANIKA Espinoza 9/13/2017

## 2017-09-13 NOTE — MR AVS SNAPSHOT
After Visit Summary   9/13/2017    Susan Locke    MRN: [id number]           Patient Information     Date Of Birth          1985        Visit Information        Provider Department      9/13/2017 9:00 AM Kevin Ponce APRN [facility]        Today's Diagnoses     Encounter for supervision of normal first pregnancy in first trimester    -  1       Follow-ups after your visit        Follow-up notes from your care team     Return in about 2 weeks (around 9/27/2017) for New OB Visit.      Your next 10 appointments already scheduled     Sep 27, 2017  8:15 AM CDT   NEW OB with Vivienne Ahn CNM   [facility] ([facility])    [facility] [address]  [address]  [facility] [address] [phone]              Who to contact     Please call your clinic at [phone] to:    Ask questions about your health    Make or cancel appointments    Discuss your medicines    Learn about your test results    Speak to your doctor   If you have compliments or concerns about an experience at your clinic, or if you wish to file a complaint, please contact [facility] Patient Relations at [phone] or email us at [email or web address]         Additional Information About Your Visit        MyChart Information     [employer] gives you secure access to your electronic health record. If you see a primary care provider, you can also send messages to your care team and make appointments. If you have questions, please call your primary care clinic.  If you do not have a primary care provider, please call [phone] and they will assist you.      [employer] is an electronic gateway that provides easy, online access to your medical records. With [employer], you can request a clinic appointment, read your test results, renew a prescription or communicate with your care team.     To  "access your existing account, please contact your [facility] or call [phone] for assistance.        Care EveryWhere ID     This is your Care EveryWhere ID. This could be used by other organizations to access your [place] medical records  [id number]        Your Vitals Were     Pulse Height Last Period Breastfeeding? BMI (Body Mass Index)       83 1.702 m (5' 7\") 07/19/2017 (Exact Date) No 24.86 kg/m2        Blood Pressure from Last 3 Encounters:   09/13/17 124/81   06/01/17 118/68   05/08/17 123/81    Weight from Last 3 Encounters:   09/13/17 72 kg (158 lb 11.2 oz)   06/01/17 73 kg (161 lb)              We Performed the Following     25- [place]-Vitamin D     ABO/Rh Type and Screen     Anti Treponema     CBC with Platelets Differential     Hepatitis B Surface Antigen     HIV Antigen Antibody Combo     Rubella Antibody IgG Quantitative     Urine Culture Aerobic Bacterial     Varicella Zoster Virus Antibody IgG        Primary Care Provider    None Specified       No primary provider on file.        Equal Access to Services     JORGE SHEN : Hadii delio ku hadasho Soomaali, waaxda luqadaha, qaybta kaalmada adeegyada, waxay speedy field . So [facility] [phone].    ATENCIÓN: Si habla español, tiene a carrera disposición servicios gratuitos de asistencia lingüística. Llame al [phone].    We comply with applicable federal civil rights laws and [place] laws. We do not discriminate on the basis of race, color, national origin, age, disability sex, sexual orientation or gender identity.            Thank you!     Thank you for choosing [facility]  for your care. Our goal is always to provide you with excellent care. Hearing back from our patients is one way we can continue to improve our services. Please take a few minutes to complete the written survey that you may receive in the mail after your visit with us. Thank you!             Your Updated " Medication List - Protect others around you: Learn how to safely use, store and throw away your medicines at www.disposemymeds.org.          This list is accurate as of: 9/13/17 10:30 AM.  Always use your most recent med list.                   Brand Name Dispense Instructions for use Diagnosis    CERAVE Crea     340 g    Apply once daily to the trunk and extremities to damp skin (after showering and patting dry, not rubbing).    Dermatitis       PRENATAL VITAMIN PO

## 2017-09-13 NOTE — PROGRESS NOTES
32 year old female, ., with LMP 2017, 8 0/7 weeks. Estimated Date of Delivery: 2018,  presents for confirmation of dates and assessment of viability. This study was done transvaginally.    Measurements     CRL = 15.6 mm = 8 0/7 weeks  EGA.        Fetal anatomy appears normal for gestational age.     Fetal/Fetal Cardiac Activity: Present.  FHR = 167bpm.     Implantation: Intrauterine.     Cervix = 4.1 cm      Maternal structures appear normal.    Impression: Single viable intrauterine pregnancy at 8+0 weeks.     Recommend comprehensive scan at 18 to 20 weeks.    ROSALVA Wills MD

## 2017-09-14 LAB
BACTERIA SPEC CULT: NORMAL
Lab: NORMAL
SPECIMEN SOURCE: NORMAL

## 2017-09-15 PROBLEM — R79.89 LOW VITAMIN D LEVEL: Status: ACTIVE | Noted: 2017-09-15

## 2017-09-27 ENCOUNTER — OFFICE VISIT (OUTPATIENT)
Dept: OBGYN | Facility: CLINIC | Age: 32
End: 2017-09-27
Attending: ADVANCED PRACTICE MIDWIFE
Payer: COMMERCIAL

## 2017-09-27 VITALS — DIASTOLIC BLOOD PRESSURE: 77 MMHG | BODY MASS INDEX: 25.08 KG/M2 | SYSTOLIC BLOOD PRESSURE: 115 MMHG | WEIGHT: 160.1 LBS

## 2017-09-27 DIAGNOSIS — Z34.01 ENCOUNTER FOR SUPERVISION OF NORMAL FIRST PREGNANCY IN FIRST TRIMESTER: Primary | ICD-10-CM

## 2017-09-27 DIAGNOSIS — M54.42 ACUTE LEFT-SIDED LOW BACK PAIN WITH LEFT-SIDED SCIATICA: ICD-10-CM

## 2017-09-27 DIAGNOSIS — Z36.82 ENCOUNTER FOR NUCHAL TRANSLUCENCY TESTING: ICD-10-CM

## 2017-09-27 DIAGNOSIS — Z36.89 ENCOUNTER FOR FETAL ANATOMIC SURVEY: ICD-10-CM

## 2017-09-27 DIAGNOSIS — M54.32 LEFT SCIATIC NERVE PAIN: ICD-10-CM

## 2017-09-27 PROCEDURE — G0145 SCR C/V CYTO,THINLAYER,RESCR: HCPCS | Performed by: ADVANCED PRACTICE MIDWIFE

## 2017-09-27 PROCEDURE — 87591 N.GONORRHOEAE DNA AMP PROB: CPT | Performed by: ADVANCED PRACTICE MIDWIFE

## 2017-09-27 PROCEDURE — 87491 CHLMYD TRACH DNA AMP PROBE: CPT | Performed by: ADVANCED PRACTICE MIDWIFE

## 2017-09-27 PROCEDURE — 87624 HPV HI-RISK TYP POOLED RSLT: CPT | Performed by: ADVANCED PRACTICE MIDWIFE

## 2017-09-27 PROCEDURE — 99211 OFF/OP EST MAY X REQ PHY/QHP: CPT | Mod: 25

## 2017-09-27 ASSESSMENT — PATIENT HEALTH QUESTIONNAIRE - PHQ9
5. POOR APPETITE OR OVEREATING: NOT AT ALL
SUM OF ALL RESPONSES TO PHQ QUESTIONS 1-9: 2

## 2017-09-27 ASSESSMENT — ANXIETY QUESTIONNAIRES
GAD7 TOTAL SCORE: 2
IF YOU CHECKED OFF ANY PROBLEMS ON THIS QUESTIONNAIRE, HOW DIFFICULT HAVE THESE PROBLEMS MADE IT FOR YOU TO DO YOUR WORK, TAKE CARE OF THINGS AT HOME, OR GET ALONG WITH OTHER PEOPLE: NOT DIFFICULT AT ALL
5. BEING SO RESTLESS THAT IT IS HARD TO SIT STILL: NOT AT ALL
2. NOT BEING ABLE TO STOP OR CONTROL WORRYING: NOT AT ALL
6. BECOMING EASILY ANNOYED OR IRRITABLE: SEVERAL DAYS
3. WORRYING TOO MUCH ABOUT DIFFERENT THINGS: NOT AT ALL
7. FEELING AFRAID AS IF SOMETHING AWFUL MIGHT HAPPEN: SEVERAL DAYS
1. FEELING NERVOUS, ANXIOUS, OR ON EDGE: NOT AT ALL

## 2017-09-27 ASSESSMENT — PAIN SCALES - GENERAL: PAINLEVEL: EXTREME PAIN (8)

## 2017-09-27 NOTE — MR AVS SNAPSHOT
"              After Visit Summary   9/27/2017    Susan Locke    MRN: [id number]           Patient Information     Date Of Birth          1985        Visit Information        Provider Department      9/27/2017 8:15 AM Vivienne Ahn CNM [facility]        Today's Diagnoses     Encounter for supervision of normal first pregnancy in first trimester    -  1    Left sciatic nerve pain        Encounter for nuchal translucency testing        Encounter for fetal anatomic survey        Acute left-sided low back pain with left-sided sciatica          Care Instructions      Thank you for choosing [facility] ([facility]) for your obstetrical care.  We are an integrated health clinic with obstetricians, midwives, a psychologist, an acupuncturist, a nutritionist, a pharmacist, internal medicine and family practice all in one.  If you have questions about services offered please ask.      o Please keep all appointments with your provider.  You will help catch, prevent and treat problems early.  o Review your Expectant Family booklet and folder given to you at this intake visit.  It can answer many basic questions including:    Treatment for nausea and vomiting    Medications that are safe in pregnancy    Healthy diet and weight gain    Exercise and activity  o ASK questions!!  Please use \"Questions for my New OB visit\" form to write down any questions or concerns for your next visit.  o Eat a healthy diet.  Visit www.choosemyplate.gov and click on  Pregnancy and Breastfeeding  for information and tips  o Do not smoke.  Avoid other people's smoke, too.  We are happy to help with referrals to stop smoking programs.  o Do not drink alcohol.  o Try to avoid people who have colds or other infections.  Practice good hand washing.  o Consider registering for our Healthy Pregnancy Class here at [facility].  This class is offered every 3rd Wednesday from 2:30-4:30 p.m.  [place] at " [phone] or online at [email or web address] or [email or web address]  o Consider registering for prenatal education classes through [employer] at [facility].  You can view class schedules and register online at www.[employer] or call [phone] for questions     For urgent concerns, call [facility] at [phone] to speak with a triage nurse during regular clinic hours (8:00 am - 4:30 pm).  This line is answered by our service 24 hours a day, after hours a provider will return your call.          Follow-ups after your visit        Additional Services     Integrative Care  Referral - Acupuncture and Chinese Medicine       Acupuncture and Chinese Medicine (Joseph Amado)    Special Instructions (scheduling/contact info):  Joseph Amado L.Ac., MS. MIGUEL, Dipl.Om  Appointments: [phone] or [email or web address]   Additional Information: KIWATCH   Address:  [facility], [address]  [address] [place] [address]    Comments:  Reason for referral /Goal of therapy: sciatic nerve  Special instructions/additional information:  Diagnoses  Encounter for supervision of normal first pregnancy in first trimester  (primary encounter diagnosis)  Left sciatic nerve pain  Visit vital signs   /77  Wt 72.6 kg (160 lb 1.6 oz)  LMP 07/19/2017 (Exact Date)  BMI 25.08 kg/m2  Hgb   Lab Results       Component                Value               Date                       HGB                      14.0                09/13/2017            Allergies   No Known Allergies  Meds  Current Outpatient Prescriptions:  VITAMIN D, CHOLECALCIFEROL, PO  Pyridoxine HCl (VITAMIN B6 PO)  Prenatal Vit-Fe Fumarate-FA (PRENATAL VITAMIN PO)  Emollient (CERAVE) CREA    No current facility-administered medications for this visit.     Problem list  Patient Active Problem List:     Dermatitis     Pruritus      Bleeding in early pregnancy     Supervision of normal pregnancy, , [facility] CNM     Low vitamin D level      Patient has signed:  [place] [employer] Consent for Service: Yes  [place] [facility] Consent for use of [place]'s EHR with non-[place] Health Care Providers: Yes  [facility] Acknowledgment of Receipt of Notice of Privacy Practices- [facility]: Yes            MAT FETAL MED CTR REFERRAL-PREGNANCY       >> Patient may proceed with recommendations for further testing as directed by the Maternal Fetal Medicine Specialist >>    >> If requesting Fetal Echo: MFM will determine appropriate location for exam due to indication.    >> If requesting Lung Maturity Amnio:  If results indicate fetal lung maturity, induction or C/S is recommended within 36 hours.  Please schedule accordingly.     Dear Patient:   Please be aware that coverage of these services is subject to the terms and limitations of your health insurance plan.  Call member services at your health plan with any benefit or coverage questions.      Please bring the following to your appointment:    >>  Any x-rays, CTs or MRIs which have been performed.  Contact the facility where they were done to arrange for  prior to your scheduled appointment.  Any new CT, MRI or other procedures ordered by your specialist must be performed at a [place] facility or coordinated by your clinic's referral office.  >>  List of current medications   >>  This referral request   >>  Any documents/labs given to you for this referral            Physical Therapy Referral       *This therapy referral will be filtered to a centralized scheduling office at [facility] and the patient will receive a call to schedule an appointment at a [place] location most convenient for them. *     [facility] provides Physical Therapy evaluation and treatment and many specialty services across the [place] system.  If  "requesting a specialty program, please choose from the list below.    If you have not heard from the scheduling office within 2 business days, please call [phone] for all locations, with the exception of [place], please call [phone].  Treatment: Evaluation & Treatment  Special Instructions/Modalities:   Special Programs: n/a    Please be aware that coverage of these services is subject to the terms and limitations of your health insurance plan.  Call member services at your health plan with any benefit or coverage questions.      **Note to Provider:  If you are referring outside of [place] for the therapy appointment, please list the name of the location in the \"special instructions\" above, print the referral and give to the patient to schedule the appointment.                  Follow-up notes from your care team     Return in about 4 weeks (around 10/25/2017) for MURALI MELVIN.      Your next 10 appointments already scheduled     Oct 11, 2017  8:00 AM CDT   Genetic Counseling with UR GEN COUNSELOR 1   [facility]etal [facility] - [facility])    [address]  [facility] [address]   [phone]            Oct 11, 2017  8:45 AM CDT   MFM NUCHAL TRANSLUCENCY with [id number]   eal Maternal Fetal Medicine Ultrasound - [facility])    [address]  [facility] [address]   [phone]            Oct 11, 2017  9:15 AM CDT   Radiology MD with MINA DRAPER MD   [facility] - [facility])    [address]  [facility] [address]   [phone]           Please arrive at the time given for your first appointment. This visit is used internally to schedule the physician's time during your ultrasound.            Oct 14, 2017 11:10 AM CDT   KAYCEE Spine with Ritchie Patiño Pt, PT   [place] [facility] ([facility]E " [facility]  )    [address]  [facility] [address]   [phone]            Oct 21, 2017  7:50 AM CDT   KAYCEE Spine with Ritchie Patiño Pt, PT   [place] [facility] ([facility] [place]  )    [address]  [facility] [address]   [phone]            Oct 25, 2017 11:30 AM CDT   RETURN OB with Vivienne Ahn, NGOZI   [facility] ([facility])    [facility] [address]  [address]  [address]  [facility] [id number] [phone]            Oct 28, 2017  7:50 AM CDT   KAYCEE Spine with Adelina Caldwell, PT   [place] [facility]  )    [address]  [facility] [address]   [phone]              Who to contact     Please call your clinic at [phone] to:    Ask questions about your health    Make or cancel appointments    Discuss your medicines    Learn about your test results    Speak to your doctor   If you have compliments or concerns about an experience at your clinic, or if you wish to file a complaint, please contact [facility] Patient Relations at [phone] or email us at [email or web address]         Additional Information About Your Visit        [employer] Information     [employer] gives you secure access to your electronic health record. If you see a primary care provider, you can also send messages to your care team and make appointments. If you have questions, please call your primary care clinic.  If you do not have a primary care provider, please call [phone] and they will assist you.      [employer] is an electronic gateway that provides easy, online access to your medical records. With [employer], you can request a clinic appointment, read your test results, renew a prescription or communicate with your care team.     To access your existing account, please contact your [place]  [facility] or call [phone] for assistance.        Care EveryWhere ID     This is your Care EveryWhere ID. This could be used by other organizations to access your [place] medical records  [id number]        Your Vitals Were     Last Period BMI (Body Mass Index)                07/19/2017 (Exact Date) 25.08 kg/m2           Blood Pressure from Last 3 Encounters:   09/27/17 115/77   09/13/17 124/81   06/01/17 118/68    Weight from Last 3 Encounters:   09/27/17 72.6 kg (160 lb 1.6 oz)   09/13/17 72 kg (158 lb 11.2 oz)   06/01/17 73 kg (161 lb)              We Performed the Following     Chlamydia Trachomatis PCR [GVY937]     Gonorrhea PCR [YVZ3113]     HPV High Risk Types DNA Cervical     Integrative Care  Referral - Acupuncture and Chinese Medicine     MAT FETAL MED CTR REFERRAL-PREGNANCY     Obtaining, preparing and conveyance of cervical or vaginal smear to laboratory.     Pap imaged thin layer screen with HPV - recommended age 30 - 65 years (select HPV order below)     Physical Therapy Referral        Primary Care Provider    None Specified       No primary provider on file.        Equal Access to Services     KILO SHNE : Hadpavan Peres, sowmya gutierrez, amy pelletier . So [facility] [phone].    ATENCIÓN: Si habla español, tiene a carrera disposición servicios gratuitos de asistencia lingüística. Llame al [phone].    We comply with applicable federal civil rights laws and [place] laws. We do not discriminate on the basis of race, color, national origin, age, disability, sex, sexual orientation, or gender identity.            Thank you!     Thank you for choosing [facility]  for your care. Our goal is always to provide you with excellent care. Hearing back from our patients is one way we can continue to improve our services. Please take a few minutes to complete the written survey that you may  receive in the mail after your visit with us. Thank you!             Your Updated Medication List - Protect others around you: Learn how to safely use, store and throw away your medicines at www.disposemymeds.org.          This list is accurate as of: 9/27/17 11:59 PM.  Always use your most recent med list.                   Brand Name Dispense Instructions for use Diagnosis    CERAVE Crea     340 g    Apply once daily to the trunk and extremities to damp skin (after showering and patting dry, not rubbing).    Dermatitis       PRENATAL VITAMIN PO           VITAMIN B6 PO      Take 25 mg by mouth 3 times daily        VITAMIN D (CHOLECALCIFEROL) PO      Take 5,000 Units by mouth daily

## 2017-09-27 NOTE — LETTER
2017       RE: Susan Locke  [address]   [facility] [address]     Dear Colleague,    Thank you for referring your patient, Susan Locke, to the [facility] at [facility]. Please see a copy of my visit note below.      SUBJECTIVE:    32 year old, female, , 10w0d,  who presents to the clinic today for a new ob visit.    Feels well. Has  started PNV.  Estimated Date of Delivery:  is calculated from Patient's last menstrual period was 2017 (exact date)..  , c/w dating u/s.  She has not had bleeding since her LMP.   Nausea mildly improved. Only taking 25mg b6 per day, discussed dosage and adding unisom as desired. Weight loss has not occurred.     Reports some Mid to left lower back that radiate down leg c/w sciatic pain. Pt would like a physical therapy referral and referral for acupuncture.    This was a planned pregnancy.   FOB is involved,  Present at appointment. Walter.    x 12 years.    OTHER CONCERNS: Desire 1st trimester screening and level 2 u/s.    ===========================================  ROS  PSYCHIATRIC:  Denies mood changes, difficulty concentrating, depression, anxiety, panic attacks or post partum depression  PHQ9: Last PHQ-9 score on record= No Value exists for the : HP#PHQ9  Social History   Substance Use Topics     Smoking status: Never Smoker     Smokeless tobacco: Never Used     Alcohol use Yes     History   Drug Use No     History   Smoking Status     Never Smoker   Smokeless Tobacco     Never Used       Alcohol use Yes     Family History   Problem Relation Age of Onset     Hypothyroidism Mother      Lung Cancer Father      Stomach Cancer Maternal Grandmother      Thyroid Disease Sister      ============================================  MEDICAL HISTORY   No Known Allergies    [unfilled]      Current Outpatient Prescriptions:      VITAMIN D, CHOLECALCIFEROL, PO,  Take 5,000 Units by mouth daily, Disp: , Rfl:      Pyridoxine HCl (VITAMIN B6 PO), Take 25 mg by mouth 3 times daily, Disp: , Rfl:      Prenatal Vit-Fe Fumarate-FA (PRENATAL VITAMIN PO), , Disp: , Rfl:      Emollient (CERAVE) CREA, Apply once daily to the trunk and extremities to damp skin (after showering and patting dry, not rubbing). (Patient not taking: Reported on 2017), Disp: 340 g, Rfl: 11    Past Medical History:   Diagnosis Date     Arthritis      Dermatitis 2017     H. pylori infection      Hay fever      Rheumatism        No past surgical history on file.         Obstetric History       T0      L0     SAB0   TAB0   Ectopic0   Multiple0   Live Births0       # Outcome Date GA Lbr Goyo/2nd Weight Sex Delivery Anes PTL Lv   1 Current                   GYN History- Last pap approx 3-4 years ago, no hx of abnormals    Needs pap with hpv cotesting today.    I personally reviewed the past social/family/medical and surgical history on the date of service.   I reviewed lab work done at Intake visit with patient.    OBJECTIVE:   PHYSICAL EXAM:  /77  Wt 72.6 kg (160 lb 1.6 oz)  LMP 2017 (Exact Date)  BMI 25.08 kg/m2  BMI- Body mass index is 25.08 kg/(m^2)., GENERAL:  Pleasant pregnant female, alert, cooperative  and well groomed.  SKIN:  Warm and dry, without lesions or rashes  HEAD: Symmetrical features.  MOUTH:  Buccal mucosa pink, moist without lesions.  Teeth in good repair.    NECK:  Thyroid without enlargement and nodules.  Lymph nodes not palpable.   LUNGS:  Clear to auscultation.  BREAST:    No dominant, fixed or suspicious masses are noted.  No skin or nipple changes or axillary nodes.   Nipples everted.      HEART:  RRR without murmur.  ABDOMEN: Soft without masses , tenderness or organomegaly.  No CVA tenderness.  Uterus palpable at size equal to dates.  No scars noted.. Fetal heart tones present via doppler, 175bpm.  MUSCULOSKELETAL:  Full range of  motion  EXTREMITIES:  No edema. No significant varicosities.   PELVIC EXAM:  GENITALIA: EGBUS  External genitalia, Bartholin's glands, urethra & Gulf's glands:normal. Vulva reveals no erythema or lesions.         VAGINA:  pink, normal rugae and discharge, no lesions, good tone.   CERVIX:  smooth, without discharge or CMT. Cervix visually closed on sse. Closed/long on bimanual.               UTERUS: nontender 10 week size.   ADNEXA:  Without masses or tenderness.   RECTAL:  Normal appearance.  Digital exam deferred.  WET PREP:Not done  GC/CHLAMYDIA CULTURE OBTAINED:YES  PAP with HPV cotesting: YES    ASSESSMENT:  Intrauterine pregnancy 10w0d size consistent with dates  Genetic Screening: First Trimester Screen, level 2 u/s    Encounter Diagnosis   Name Primary?     Encounter for supervision of normal first pregnancy in first trimester Yes        PLAN:  Orders Placed This Encounter   Procedures     Obtaining, preparing and conveyance of cervical or vaginal smear to laboratory.     Pap imaged thin layer screen with HPV - recommended age 30 - 65 years (select HPV order below)     HPV High Risk Types DNA Cervical     Physical Therapy Referral     Integrative Care  Referral - Acupuncture and Chinese Medicine     [facility] REFERRAL-PREGNANCY     Orders Placed This Encounter   Medications     VITAMIN D, CHOLECALCIFEROL, PO     Sig: Take 5,000 Units by mouth daily     Pyridoxine HCl (VITAMIN B6 PO)     Sig: Take 25 mg by mouth 3 times daily     - Reviewed use of triage nurse line and contacting the on-call provider after hours for an urgent need such as fever, vagina bleeding, bladder or vaginal infection, rupture of membranes,  or term labor.    - Reviewed best evidence for: weight gain for her weight and height for pregnancy:  RECOMMENDED WEIGHT GAIN: 25-35 lbs.   healthy diet and foods to avoid; exercise and activity during pregnancy;avoiding exposure to toxoplasmosis; and maintenance of a generally  healthy lifestyle.   - Discussed the harms, benefits, side effects and alternative therapies for current prescribed and OTC medications.    - All pt's questions discussed and answered.  Pt verbalized understanding of and agreement to plan of care.     - Reviewed OBI labs.  - Continue vitamin d supplementation 5000 IU/day.  - Desires 1st trimester screening and level 2 ultrasound, MFM referral placed.  - Declined flu vaccine.  - Pap with hpv cotesting obtained.  - GC/CT collected.  - Physical therapy referral placed. Reviewed exercises/stretches, comfort measures for sciatic nerve pain.  - Acupuncture and CM referral provided.  - Reveiwed dosage and timing for vitamin b6 administration.  - Continue scheduled prenatal care, RTC in approx 4 weeks and prn if questions or concerns        Again, thank you for allowing me to participate in the care of your patient.      Sincerely,    Vivienne Ahn CNM

## 2017-09-27 NOTE — PATIENT INSTRUCTIONS
"  Thank you for choosing [facility] for your obstetrical care.  We are an integrated health clinic with obstetricians, midwives, a psychologist, an acupuncturist, a nutritionist, a pharmacist, internal medicine and family practice all in one.  If you have questions about services offered please ask.      o Please keep all appointments with your provider.  You will help catch, prevent and treat problems early.  o Review your Expectant Family booklet and folder given to you at this intake visit.  It can answer many basic questions including:    Treatment for nausea and vomiting    Medications that are safe in pregnancy    Healthy diet and weight gain    Exercise and activity  o ASK questions!!  Please use \"Questions for my New OB visit\" form to write down any questions or concerns for your next visit.  o Eat a healthy diet.  Visit www.choosemyplate.gov and click on  Pregnancy and Breastfeeding  for information and tips  o Do not smoke.  Avoid other people's smoke, too.  We are happy to help with referrals to stop smoking programs.  o Do not drink alcohol.  o Try to avoid people who have colds or other infections.  Practice good hand washing.  o Consider registering for our Healthy Pregnancy Class here at [facility].  This class is offered every 3rd Wednesday from 2:30-4:30 p.m.  [place] at [phone] or online at [email or web address]  o Consider registering for prenatal education classes through [place] [facility].  You can view class schedules and register online at [email or web address] or call [phone] for questions     For urgent concerns, call [facility] at [phone] to speak with a triage nurse during regular clinic hours (8:00 am - 4:30 pm).  This line is answered by our service 24 hours a day, after hours a provider will return your call.  "

## 2017-09-27 NOTE — NURSING NOTE
Breastfeeding education provided:  - Benefits of Breastfeeding       Patient also enrolled in occlusive device study

## 2017-09-28 ENCOUNTER — THERAPY VISIT (OUTPATIENT)
Dept: PHYSICAL THERAPY | Facility: CLINIC | Age: 32
End: 2017-09-28
Payer: COMMERCIAL

## 2017-09-28 DIAGNOSIS — M54.42 ACUTE LEFT-SIDED LOW BACK PAIN WITH LEFT-SIDED SCIATICA: Primary | ICD-10-CM

## 2017-09-28 LAB
C TRACH DNA SPEC QL NAA+PROBE: NEGATIVE
N GONORRHOEA DNA SPEC QL NAA+PROBE: NEGATIVE
SPECIMEN SOURCE: NORMAL
SPECIMEN SOURCE: NORMAL

## 2017-09-28 PROCEDURE — 97161 PT EVAL LOW COMPLEX 20 MIN: CPT | Mod: GP | Performed by: PHYSICAL THERAPIST

## 2017-09-28 PROCEDURE — 97110 THERAPEUTIC EXERCISES: CPT | Mod: GP | Performed by: PHYSICAL THERAPIST

## 2017-09-28 PROCEDURE — 97112 NEUROMUSCULAR REEDUCATION: CPT | Mod: GP | Performed by: PHYSICAL THERAPIST

## 2017-09-28 ASSESSMENT — ANXIETY QUESTIONNAIRES: GAD7 TOTAL SCORE: 2

## 2017-09-28 NOTE — LETTER
"[facility] PHYSICAL [facility]  [address]  [facility] [place] [id number] [phone]    2017    Re: Susan Locke   :   1985  MRN:  [id number]   REFERRING PHYSICIAN:   Vivienne Gardner    [facility]    Date of Initial Evaluation:  2017  Visits:   1  Reason for Referral:  Acute left-sided low back pain with left-sided sciatica    EVALUATION SUMMARY    Subjective:  Patient is a 32 year old female presenting with rehab back hpi. The history is provided by the patient. No  was used.   Susan Locke is a 32 year old female with a sacroiliac and lumbar condition.  Condition occurred with:  Insidious onset.  Condition occurred: for unknown reasons.  This is a new condition  1 week hx of L SIJ pain with sciatic pain L buttock and upper posterior thigh. She is 10 weeks pregnant. She says she feels \"slipping out\" of something in her back and then painful. Then \"slips\" back in and no pain.    Patient reports pain:  SI joint left.  Radiates to:  Gluteals left and thigh left.  Pain is described as aching and sharp and is intermittent and reported as 5/10.  Associated symptoms:  Loss of strength and pregnancy. Pain is worse during the day.  Symptoms are exacerbated by twisting, sitting, certain positions and walking and relieved by bracing/immobilizing.  Since onset symptoms are unchanged.  General health as reported by patient is excellent.  Pertinent medical history includes:  None.  Medical allergies: no.  Other surgeries include:  No.    Current occupation is Sits at computer all day.  Patient is working in normal job without restrictions.  Primary job tasks include:  Prolonged sitting.  Barriers include:  None as reported by the patient.  Red flags:  None as reported by the patient.                  Objective:  Standing Alignment:    Pelvic:  Iliac crest high R and PSIS high " R  Gait:    Gait Type:  Normal   Assistive Devices:  None    Lumbar/SI Evaluation  ROM:    AROM Lumbar:   Flexion:        75% pain increased L  Ext:                    Central pressure no pain 75%   Side Bend:        Left:     Right:   Rotation:           Left:     Right:   Side Glide:        Left:     Right:         Re: Susan Locke   :   1985    Lumbar Myotomes:  normal  Lumbar DTR's:  normal  Cord Signs:  normal  Lumbar Dermtomes:  normal  Neural Tension/Mobility:    Left side:  Slump positive.   Lumbar Palpation:    Tenderness present at Left:    PSIS  Lumbar Provocation:    Left negative with:  PROM hip  Right negative with:  PROM hip  SI joint/Sacrum:    +instabity test L  Left positive at:    Thigh thrust    Assessment/Plan:    Patient is a 32 year old female with lumbar and sacral complaints.    Patient has the following significant findings with corresponding treatment plan.                Diagnosis 1:  L SIJ hypermobility   Pain -  manual therapy, self management, education, directional preference exercise and home program  Decreased strength - therapeutic exercise and therapeutic activities  Impaired muscle performance - neuro re-education  Decreased function - therapeutic activities  Impaired posture - neuro re-education  Instability -  Therapeutic Activity  Therapeutic Exercise    Therapy Evaluation Codes:   1) History comprised of:   Personal factors that impact the plan of care:      None.    Comorbidity factors that impact the plan of care are:      she is 10 weeks pregnant and None.     Medications impacting care: None.  2) Examination of Body Systems comprised of:   Body structures and functions that impact the plan of care:      Lumbar spine and Sacral illiac joint.   Activity limitations that impact the plan of care are:      Bending, Driving, Reading/Computer work, Sitting, Standing and Working.  3) Clinical presentation characteristics  are:   Evolving/Changing.  4) Decision-Making    Low complexity using standardized patient assessment instrument and/or measureable assessment of functional outcome.  Cumulative Therapy Evaluation is: Low complexity.    Previous and current functional limitations:  (See Goal Flow Sheet for this information)    Short term and Long term goals: (See Goal Flow Sheet for this information)     Communication ability:  Patient appears to be able to clearly communicate and understand verbal and written communication and follow directions correctly.  Re: Susan Locke   :   1985    Treatment Explanation - The following has been discussed with the patient:   RX ordered/plan of care  Anticipated outcomes  Possible risks and side effects  This patient would benefit from PT intervention to resume normal activities.   Rehab potential is excellent.    Frequency:  1 X week, once daily  Duration:  for 8 weeks  Discharge Plan:  Achieve all LTG.  Independent in home treatment program.  Reach maximal therapeutic benefit.    Thank you for your referral.    INQUIRIES  Therapist: Ritchie Patiño, PT  [facility] PH[facility]ER[facility]  [address] [place] [address] [address]  [facility] [address]  Phone: [phone]  Fax: [phone]

## 2017-09-28 NOTE — MR AVS SNAPSHOT
After Visit Summary   9/28/2017    Susan Locke    MRN: [id number]           Patient Information     Date Of Birth          1985        Visit Information        Provider Department      9/28/2017 1:40 PM Haroon Lloyd, TIKA Dugan [facility]        Today's Diagnoses     Acute left-sided low back pain with left-sided sciatica    -  1       Follow-ups after your visit        Your next 10 appointments already scheduled     Oct 11, 2017  8:00 AM CDT   Genetic Counseling with MINA GEN COUNSELOR 1   [facility] Maternal Fetal [facility] - [facility])    [address]  [facility] [address]   [phone]            Oct 11, 2017  8:45 AM CDT   MFM NUCHAL TRANSLUCENCY with [id number]   [facility] Maternal Fetal Medicine Ultrasound - [facility])    [address]  [facility] [address]   [phone]            Oct 11, 2017  9:15 AM CDT   Radiology MD with MINA DRAPER MD   [facility] M[facility]etal Medicine - [facility])    [address]  [facility] [address]   [phone]           Please arrive at the time given for your first appointment. This visit is used internally to schedule the physician's time during your ultrasound.            Oct 14, 2017 11:10 AM CDT   KAYCEE Spine with Ritchie Patiño Pt, PT   [facility] ([facility] [place]  )    [address]  [facility] [address]   [phone]            Oct 21, 2017  7:50 AM CDT   KAYCEE Spine with Ritchie Patiño Pt, PT   [facility] ([facility] [place]  )    [address]  [facility] [address]   [phone]            Oct 25, 2017 11:30 AM CDT   RETURN OB with Vivienne Ahn CNM   [facility] ([facility])    [place]  [address] [facility] [address]  [address]  [facility] [address]   [phone]            Oct 28, 2017  7:50 AM CDT   KAYCEE Spine with Adelina Caldwell PT   [place] [facility] ([facility]  )    [address]  [facility] [address]   [phone]              Future tests that were ordered for you today     Open Future Orders        Priority Expected Expires Ordered    [facility] Genetic Counseling Routine  9/27/2018 9/27/2017    [facility] Nuchal Transluc w US Single Routine  7/27/2018 9/27/2017    MFM US Comprehensive Single Routine  7/27/2018 9/27/2017            Who to contact     If you have questions or need follow up information about today's clinic visit or your schedule please contact [place] [facility] PH[facility]ICAL THERAP[facility] directly at [phone].  Normal or non-critical lab and imaging results will be communicated to you by Red Zebrahart, letter or phone within 4 business days after the clinic has received the results. If you do not hear from us within 7 days, please contact the clinic through The [employer] Videot or phone. If you have a critical or abnormal lab result, we will notify you by phone as soon as possible.  Submit refill requests through Trevena or call your pharmacy and they will forward the refill request to us. Please allow 3 business days for your refill to be completed.          Additional Information About Your Visit        Red Zebrahart Information     Tre[employer]a gives you secure access to your electronic health record. If you see a primary care provider, you can also send messages to your care team and make appointments. If you have questions, please call your primary care clinic.  If you do not have a primary care provider, please call [phone] and they will assist you.        Care EveryWhere ID     This is your Care EveryWhere ID. This could be used by other organizations to access your [facility] medical records  [id number]         Your Vitals Were     Last Period                   07/19/2017 (Exact Date)            Blood Pressure from Last 3 Encounters:   09/27/17 115/77   09/13/17 124/81   06/01/17 118/68    Weight from Last 3 Encounters:   09/27/17 72.6 kg (160 lb 1.6 oz)   09/13/17 72 kg (158 lb 11.2 oz)   06/01/17 73 kg (161 lb)              We Performed the Following     HC PT EVAL, LOW COMPLEXITY     KAYCEE INITIAL EVAL REPORT     NEUROMUSCULAR RE-EDUCATION     THERAPEUTIC EXERCISES        Primary Care Provider    None Specified       No primary provider on file.        Equal Access to Services     [facility]: Hadpavan Peres, sowmya gutierrez, ashely murray, amy field . So [facility] [phone].    ATENCIÓN: Si habla español, tiene a carrera disposición servicios gratuitos de asistencia lingüística. Llame al [phone].    We comply with applicable federal civil rights laws and [place] laws. We do not discriminate on the basis of race, color, national origin, age, disability sex, sexual orientation or gender identity.            Thank you!     Thank you for choosing [facility]  for your care. Our goal is always to provide you with excellent care. Hearing back from our patients is one way we can continue to improve our services. Please take a few minutes to complete the written survey that you may receive in the mail after your visit with us. Thank you!             Your Updated Medication List - Protect others around you: Learn how to safely use, store and throw away your medicines at www.disposemymeds.org.          This list is accurate as of: 9/28/17  3:33 PM.  Always use your most recent med list.                   Brand Name Dispense Instructions for use Diagnosis    CERAVE Crea     340 g    Apply once daily to the trunk and extremities to damp skin (after showering and patting dry, not rubbing).    Dermatitis       PRENATAL VITAMIN  PO           VITAMIN B6 PO      Take 25 mg by mouth 3 times daily        VITAMIN D (CHOLECALCIFEROL) PO      Take 5,000 Units by mouth daily

## 2017-09-28 NOTE — PROGRESS NOTES
"Subjective:    Patient is a 32 year old female presenting with rehab back hpi. The history is provided by the patient. No  was used.   Susan Locke is a 32 year old female with a sacroiliac and lumbar condition.  Condition occurred with:  Insidious onset.  Condition occurred: for unknown reasons.  This is a new condition  1 week hx of L SIJ pain with sciatic pain L buttock and upper posterior thigh. She is 10 weeks pregnant. She says she feels \"slipping out\" of something in her back and then painful. Then \"slips\" back in and no pain.    Patient reports pain:  SI joint left.  Radiates to:  Gluteals left and thigh left.  Pain is described as aching and sharp and is intermittent and reported as 5/10.  Associated symptoms:  Loss of strength and pregnancy. Pain is worse during the day.  Symptoms are exacerbated by twisting, sitting, certain positions and walking and relieved by bracing/immobilizing.  Since onset symptoms are unchanged.        General health as reported by patient is excellent.  Pertinent medical history includes:  None.  Medical allergies: no.  Other surgeries include:  No.    Current occupation is Sits at [employer] all day.  Patient is working in normal job without restrictions.  Primary job tasks include:  Prolonged sitting.    Barriers include:  None as reported by the patient.    Red flags:  None as reported by the patient.                        Objective:    Standing Alignment:          Pelvic:  Iliac crest high R and PSIS high R          Gait:    Gait Type:  Normal   Assistive Devices:  None                 Lumbar/SI Evaluation  ROM:    AROM Lumbar:   Flexion:        75% pain increased L  Ext:                    Central pressure no pain 75%   Side Bend:        Left:     Right:   Rotation:           Left:     Right:   Side Glide:        Left:     Right:           Lumbar Myotomes:  normal            Lumbar DTR's:  normal      Cord Signs:  normal    Lumbar Dermtomes:  " normal                Neural Tension/Mobility:    Left side:  Slump positive.     Lumbar Palpation:    Tenderness present at Left:    PSIS      Lumbar Provocation:      Left negative with:  PROM hip    Right negative with:  PROM hip    SI joint/Sacrum:    +instabity test L    Left positive at:    Thigh thrust                                                   General     ROS    Assessment/Plan:      Patient is a 32 year old female with lumbar and sacral complaints.    Patient has the following significant findings with corresponding treatment plan.                Diagnosis 1:  L SIJ hypermobility   Pain -  manual therapy, self management, education, directional preference exercise and home program  Decreased strength - therapeutic exercise and therapeutic activities  Impaired muscle performance - neuro re-education  Decreased function - therapeutic activities  Impaired posture - neuro re-education  Instability -  Therapeutic Activity  Therapeutic Exercise    Therapy Evaluation Codes:   1) History comprised of:   Personal factors that impact the plan of care:      None.    Comorbidity factors that impact the plan of care are:      she is 10 weeks pregnant and None.     Medications impacting care: None.  2) Examination of Body Systems comprised of:   Body structures and functions that impact the plan of care:      Lumbar spine and Sacral illiac joint.   Activity limitations that impact the plan of care are:      Bending, Driving, Reading/Computer work, Sitting, Standing and Working.  3) Clinical presentation characteristics are:   Evolving/Changing.  4) Decision-Making    Low complexity using standardized patient assessment instrument and/or measureable assessment of functional outcome.  Cumulative Therapy Evaluation is: Low complexity.    Previous and current functional limitations:  (See Goal Flow Sheet for this information)    Short term and Long term goals: (See Goal Flow Sheet for this information)      Communication ability:  Patient appears to be able to clearly communicate and understand verbal and written communication and follow directions correctly.  Treatment Explanation - The following has been discussed with the patient:   RX ordered/plan of care  Anticipated outcomes  Possible risks and side effects  This patient would benefit from PT intervention to resume normal activities.   Rehab potential is excellent.    Frequency:  1 X week, once daily  Duration:  for 8 weeks  Discharge Plan:  Achieve all LTG.  Independent in home treatment program.  Reach maximal therapeutic benefit.    Please refer to the daily flowsheet for treatment today, total treatment time and time spent performing 1:1 timed codes.

## 2017-09-29 LAB
COPATH REPORT: NORMAL
PAP: NORMAL

## 2017-10-03 LAB
FINAL DIAGNOSIS: NORMAL
HPV HR 12 DNA CVX QL NAA+PROBE: NEGATIVE
HPV16 DNA SPEC QL NAA+PROBE: NEGATIVE
HPV18 DNA SPEC QL NAA+PROBE: NEGATIVE
SPECIMEN DESCRIPTION: NORMAL

## 2017-10-10 ENCOUNTER — PRE VISIT (OUTPATIENT)
Dept: MATERNAL FETAL MEDICINE | Facility: CLINIC | Age: 32
End: 2017-10-10

## 2017-10-11 ENCOUNTER — HOSPITAL ENCOUNTER (OUTPATIENT)
Dept: ULTRASOUND IMAGING | Facility: CLINIC | Age: 32
Discharge: HOME | End: 2017-10-11
Attending: ADVANCED PRACTICE MIDWIFE | Admitting: ADVANCED PRACTICE MIDWIFE
Payer: COMMERCIAL

## 2017-10-11 ENCOUNTER — OFFICE VISIT (OUTPATIENT)
Dept: MATERNAL FETAL MEDICINE | Facility: CLINIC | Age: 32
End: 2017-10-11
Attending: ADVANCED PRACTICE MIDWIFE
Payer: COMMERCIAL

## 2017-10-11 DIAGNOSIS — O26.90 PREGNANCY RELATED CONDITION, UNSPECIFIED TRIMESTER: ICD-10-CM

## 2017-10-11 DIAGNOSIS — Z36.82 ENCOUNTER FOR (NT) NUCHAL TRANSLUCENCY SCAN: Primary | ICD-10-CM

## 2017-10-11 PROCEDURE — 40000072 ZZH STATISTIC GENETIC COUNSELING, < 16 MIN: Mod: ZF | Performed by: GENETIC COUNSELOR, MS

## 2017-10-11 PROCEDURE — 36415 COLL VENOUS BLD VENIPUNCTURE: CPT | Performed by: ADVANCED PRACTICE MIDWIFE

## 2017-10-11 PROCEDURE — 84163 PAPPA SERUM: CPT | Performed by: ADVANCED PRACTICE MIDWIFE

## 2017-10-11 PROCEDURE — 84704 HCG FREE BETACHAIN TEST: CPT | Performed by: ADVANCED PRACTICE MIDWIFE

## 2017-10-11 PROCEDURE — 76813 OB US NUCHAL MEAS 1 GEST: CPT

## 2017-10-11 NOTE — MR AVS SNAPSHOT
After Visit Summary   10/11/2017    Susan Locke    MRN: [id number]           Patient Information     Date Of Birth          1985        Visit Information        Provider Department      10/11/2017 8:00 AM Duncan Almanzar GC [facility] Maternal Fetal Medicine [facility]        Today's Diagnoses     Pregnancy related condition, unspecified trimester           Follow-ups after your visit        Your next 10 appointments already scheduled     Oct 25, 2017 11:30 AM CDT   RETURN OB with Vivienne Ahn CNM   [facility] ([facility])    [facility] [address]  [facility] [address]   [phone]            Nov 29, 2017  8:00 AM PHILIP DRAPER US COMP with [id number]   [facility] Maternal Fetal Medicine Ultrasound - [place] ([facility])    [address]  [facility] [id number] [phone]           Wear comfortable clothes and leave your valuables at home.            Nov 29, 2017  8:30 AM PHILIP   Radiology MD with UR BONNY OWENS   [facility] Maternal Fetal Medicine - [facility])    [address]  [facility] [address] [phone]           Please arrive at the time given for your first appointment. This visit is used internally to schedule the physician's time during your ultrasound.              Who to contact     If you have questions or need follow up information about today's clinic visit or your schedule please contact [facility] MATERNAL FETAL MEDICINE [facility] directly at [phone].  Normal or non-critical lab and imaging results will be communicated to you by MyChart, letter or phone within 4 business days after the clinic has received the results. If you do not hear from us within 7 days, please contact the clinic through MyChart or phone. If you have a critical or abnormal lab result, we will notify  you by phone as soon as possible.  Submit refill requests through [employer] or call your pharmacy and they will forward the refill request to us. Please allow 3 business days for your refill to be completed.          Additional Information About Your Visit        [employer] Information     [employer] gives you secure access to your electronic health record. If you see a primary care provider, you can also send messages to your care team and make appointments. If you have questions, please call your primary care clinic.  If you do not have a primary care provider, please call [phone] and they will assist you.        Care EveryWhere ID     This is your Care EveryWhere ID. This could be used by other organizations to access your [place] medical records  [id number]        Your Vitals Were     Last Period                   07/19/2017 (Exact Date)            Blood Pressure from Last 3 Encounters:   09/27/17 115/77   09/13/17 124/81   06/01/17 118/68    Weight from Last 3 Encounters:   09/27/17 72.6 kg (160 lb 1.6 oz)   09/13/17 72 kg (158 lb 11.2 oz)   06/01/17 73 kg (161 lb)              We Performed the Following     M Genetic Counseling        Primary Care Provider    None Specified       No primary provider on file.        Equal Access to Services     JORGE SHEN : Hadpavan Peres, sowmya gutierrez, ashely murray, amy magdaleno. [facility] [phone].    ATENCIÓN: Si habla español, tiene a carrera disposición servicios gratuitos de asistencia lingüística. Llame al [phone].    We comply with applicable federal civil rights laws and [place] laws. We do not discriminate on the basis of race, color, national origin, age, disability, sex, sexual orientation, or gender identity.            Thank you!     Thank you for choosing [facility]  for your care. Our goal is always to provide you with excellent care. Hearing back from our  patients is one way we can continue to improve our services. Please take a few minutes to complete the written survey that you may receive in the mail after your visit with us. Thank you!             Your Updated Medication List - Protect others around you: Learn how to safely use, store and throw away your medicines at www.disposemymeds.org.          This list is accurate as of: 10/11/17  9:57 AM.  Always use your most recent med list.                   Brand Name Dispense Instructions for use Diagnosis    CERAVE Crea     340 g    Apply once daily to the trunk and extremities to damp skin (after showering and patting dry, not rubbing).    Dermatitis       PRENATAL VITAMIN PO           VITAMIN B6 PO      Take 25 mg by mouth 3 times daily        VITAMIN D (CHOLECALCIFEROL) PO      Take 5,000 Units by mouth daily

## 2017-10-11 NOTE — MR AVS SNAPSHOT
After Visit Summary   10/11/2017    Susan Locke    MRN: [id number]           Patient Information     Date Of Birth          1985        Visit Information        Provider Department      10/11/2017 9:15 AM Joy Davidson MD [facility] Maternal Fetal Medicine - [place]        Today's Diagnoses     Encounter for (NT) nuchal translucency scan    -  1       Follow-ups after your visit        Your next 10 appointments already scheduled     Oct 11, 2017  9:45 AM CDT   Nurse Visit with [facility] Nurse   [facility] ([facility])    [place] [address]  [address]  [address]  [facility] [address]   [phone]            Oct 25, 2017 11:30 AM CDT   RETURN OB with Vivienne Ahn CNM   [facility] ([facility])    [place] [facility] [address]  [address]  [address]  [facility] [address]   [phone]            Nov 29, 2017  8:00 AM PHILIP DRAPER US COMP with [id number]   [facility] Maternal Fetal Medicine Ultrasound - [place] ([facility])    [address]  [facility] [address] [phone]           Wear comfortable clothes and leave your valuables at home.            Nov 29, 2017  8:30 AM PHILIP   Radiology MD with UR BONNY OWENS   [facility] Maternal Fetal Medicine - [place] ([facility])    [address]  [facility] [address]   [phone]           Please arrive at the time given for your first appointment. This visit is used internally to schedule the physician's time during your ultrasound.              Future tests that were ordered for you today     Open Future Orders        Priority Expected Expires Ordered    First Trimester Screen Biochem Markers Routine 10/11/2017 10/11/2018 10/11/2017            Who to contact     If you have questions or need follow up information about today's clinic  visit or your schedule please contact [facility] MATERNAL FETAL MEDICINE [facility] directly at [phone].  Normal or non-critical lab and imaging results will be communicated to you by MyChart, letter or phone within 4 business days after the clinic has received the results. If you do not hear from us within 7 days, please contact the clinic through Lumenz[employer] or phone. If you have a critical or abnormal lab result, we will notify you by phone as soon as possible.  Submit refill requests through [employer] or call your pharmacy and they will forward the refill request to us. Please allow 3 business days for your refill to be completed.          Additional Information About Your Visit        [employer] Information     [employer] gives you secure access to your electronic health record. If you see a primary care provider, you can also send messages to your care team and make appointments. If you have questions, please call your primary care clinic.  If you do not have a primary care provider, please call [phone] and they will assist you.        Care EveryWhere ID     This is your Care EveryWhere ID. This could be used by other organizations to access your [place] medical records  [id number]        Your Vitals Were     Last Period                   07/19/2017 (Exact Date)            Blood Pressure from Last 3 Encounters:   09/27/17 115/77   09/13/17 124/81   06/01/17 118/68    Weight from Last 3 Encounters:   09/27/17 160 lb 1.6 oz (72.6 kg)   09/13/17 158 lb 11.2 oz (72 kg)   06/01/17 161 lb (73 kg)              Today, you had the following     No orders found for display       Primary Care Provider    None Specified       No primary provider on file.        Equal Access to Services     [facility]: Cristiane Peres, sowmya gutierrez, qaamy jones. [facility] [phone].    ATENCIÓN: Si habla español, tiene a carrera disposición servicios gratuitos de  asistencia lingüística. Abdirahman al [phone].    We comply with applicable federal civil rights laws and [place] laws. We do not discriminate on the basis of race, color, national origin, age, disability, sex, sexual orientation, or gender identity.            Thank you!     Thank you for choosing [facility]  for your care. Our goal is always to provide you with excellent care. Hearing back from our patients is one way we can continue to improve our services. Please take a few minutes to complete the written survey that you may receive in the mail after your visit with us. Thank you!             Your Updated Medication List - Protect others around you: Learn how to safely use, store and throw away your medicines at www.disposemymeds.org.          This list is accurate as of: 10/11/17  9:24 AM.  Always use your most recent med list.                   Brand Name Dispense Instructions for use Diagnosis    CERAVE Crea     340 g    Apply once daily to the trunk and extremities to damp skin (after showering and patting dry, not rubbing).    Dermatitis       PRENATAL VITAMIN PO           VITAMIN B6 PO      Take 25 mg by mouth 3 times daily        VITAMIN D (CHOLECALCIFEROL) PO      Take 5,000 Units by mouth daily

## 2017-10-11 NOTE — PROGRESS NOTES
[facility] Maternal Fetal Medicine Center  Genetic Counseling Consult    Patient: Susan Locke YOB: 1985   Date of Service: 10/11/17      Susan Locke was seen at [facility] with her  Walter for genetic consultation to discuss the options for screening and testing for fetal chromosome abnormalities.  The indication for genetic counseling is routine screening for aneuploidy.        Impression/Plan:   1.  Susan had an ultrasound and blood draw for first trimester screening.  Results are expected within 3-5 days, and will be available in [facility].  We will contact her to discuss the results, and a copy will be forwarded to the office of the referring OB provider.  Susan will be contacted at the phone number she provided, [phone], and results will be left in her voicemail if she cannot be reached.    2.  Maternal serum AFP (single marker screen) is recommended after 15 weeks to screen for open neural tube defects. A quad screen should not be performed.    Pregnancy History:   /Parity:    Age at Delivery: 33 year old  ERIC: 2018, by Last Menstrual Period  Gestational Age: 12w0d    No significant complications or exposures were reported in the current pregnancy.    Medical History:   Susan s reported medical history is not expected to impact pregnancy management or risks to fetal development.       Family History:   A three-generation pedigree was obtained, and is scanned under the  Media  tab.   The following significant findings were reported by Susan:    1 sister, 29, healthy with no children    Mother 66, healthy    Father,  at 36 from lung cancer, with a positive history of smoking    FOB: 30, healthy, with no children from any other relationships.      FOB: 2 sisters, 25 and 17, both healthy with no children    FOB: Parents alive and well at 52 and 56    Otherwise, the reported family history is negative for  multiple miscarriages, stillbirths, birth defects, cognitive impairment, known genetic conditions, and consanguinity.       Carrier Screening:   The patient reports that she is of  ancestry and that the father of the pregnancy has [facility] ancestry:     Cystic fibrosis is an autosomal recessive genetic condition that occurs with increased frequency in individuals of  ancestry and carrier screening for this condition is available.  In addition,  screening in the [facility] includes cystic fibrosis.      Some populations, including [place], have less information available regarding the frequency of specific genetic conditions.  We discussed that individuals in any population can be carriers for rare genetic diseases.        Expanded carrier screening for mutations in a large panel of genes associated with autosomal recessive conditions including cystic fibrosis, spinal muscular atrophy, and others, is now available.      The patient has declined the carrier screening options reviewed today.       Risk Assessment for Chromosome Conditions:   We explained that the risk for fetal chromosome abnormalities increases with maternal age. We discussed specific features of common chromosome abnormalities, including Down syndrome, trisomy 13, trisomy 18, and sex chromosome trisomies.      - At age 32 at midtrimester, the risk to have a baby with Down syndrome is 1 in 597.    - At age 32 at midtrimester, the risk to have a baby with any chromosome abnormality is 1 in 299.     - At age 33 at delivery, the risk to have a baby with Down syndrome is 1 in 769.     - At age 33 at delivery, the risk to have a baby with any chromosome abnormality is 1 in 322.          Testing Options:   We discussed the following options:   First trimester screening    First trimester ultrasound with nuchal translucency and nasal bone assessments, maternal plasma hCG, TAMAR-A, and AFP measurement    Screens for fetal  trisomy 21, trisomy 13, and trisomy 18    Cannot screen for open neural tube defects; maternal serum AFP after 15 weeks is recommended     Non-invasive Prenatal Testing (NIPT)    Maternal plasma cell-free DNA testing; first trimester ultrasound with nuchal translucency and nasal bone assessment is recommended, when appropriate    Screens for fetal trisomy 21, trisomy 13, trisomy 18, and sex chromosome aneuploidy    Cannot screen for open neural tube defects; maternal serum AFP after 15 weeks is recommended     Quad screen:     Maternal plasma AFP, hCG, estriol, and inhibin measurement between 15-24 weeks gestation    Provides risk assessment for fetal Down syndrome, trisomy 18, and neural tube defects     Genetic Amniocentesis    Invasive procedure typically performed in the second trimester by which amniotic fluid is obtained for the purpose of chromosome analysis and/or other prenatal genetic analysis    Diagnostic results; >99% sensitivity for fetal chromosome abnormalities    AFAFP measurement tests for open neural tube defects     Comprehensive (Level II) ultrasound:     Detailed ultrasound performed between 18-22 weeks gestation.    Screen for major birth defects and markers for aneuploidy.    We reviewed the benefits and limitations of this testing.  Screening tests provide a risk assessment specific to the pregnancy for certain fetal chromosome abnormalities, but cannot definitively diagnose or exclude a fetal chromosome abnormality.  Follow-up genetic counseling and consideration of diagnostic testing is recommended with any abnormal screening result.     Diagnostic tests carry inherent risks- including risk of miscarriage- that require careful consideration.  These tests can detect fetal chromosome abnormalities with greater than 99% certainty.  Results can be compromised by maternal cell contamination or mosaicism, and are limited by the resolution of cytogenetic G-banding technology.  There is no  screening nor diagnostic test that can detect all forms of birth defects or mental disability.    At the conclusion of our conversation today Susan wishes to proceed with first trimester screening and had blood drawn at the conclusion of her appointments today.  Please see Impression/Plan section for results disclosure plan.       It was a pleasure to be involved with Susan s care. Face-to-face time of the meeting was 30 minutes.      Duncan Almanzar MS, [facility]  Certified Genetic Counselor  Phone: [phone]  Pager: [phone]

## 2017-10-11 NOTE — PROGRESS NOTES
Please see the imaging tab for details of the ultrasound performed today.    Joy Davidson MD  Specialist in Maternal-Fetal Medicine

## 2017-10-13 ENCOUNTER — TELEPHONE (OUTPATIENT)
Dept: MATERNAL FETAL MEDICINE | Facility: CLINIC | Age: 32
End: 2017-10-13

## 2017-10-13 NOTE — TELEPHONE ENCOUNTER
10/13/2017        Called Susan to discuss first trimester screen results.  Results are screen negative for Down syndrome and trisomy 18/13.  Post test risks are 1/>10,000 for these conditions. This information was left in Susan's voicemail as planned at her appointment and Susan was encouraged to reach out if she has any questions or concerns in the future.        Duncan Almanzar MS, [facility] [place]  Certified Genetic Counselor  Phone: [phone]  Pager: [phone]

## 2017-10-15 ENCOUNTER — NURSE TRIAGE (OUTPATIENT)
Dept: NURSING | Facility: CLINIC | Age: 32
End: 2017-10-15

## 2017-10-15 NOTE — TELEPHONE ENCOUNTER
Additional Information    Negative: Severe difficulty breathing (e.g., struggling for each breath, speaks in single words)    Negative: Sounds like a life-threatening emergency to the triager    Negative: Runny nose is caused by pollen or other allergies    Negative: Cough is main symptom    Negative: Severe sore throat    Negative: Fever > 104 F (40 C)    Negative: [1] Difficulty breathing AND [2] not severe AND [3] not from stuffy nose (e.g., not relieved by cleaning out the nose)    Negative: Patient sounds very sick or weak to the triager    Negative: [1] Fever > 101 F (38.3 C) AND [2] age > 60    Negative: [1] Fever > 101 F (38.3 C) AND [2] bedridden (e.g., nursing home patient, CVA, chronic illness, recovering from surgery)    Negative: [1] Fever > 100.5 F (38.1 C) AND [2] diabetes mellitus or weak immune system (e.g., HIV positive, cancer chemo, splenectomy, organ transplant, chronic steroids)    Negative: Fever present > 3 days (72 hours)    Negative: [1] Fever returns after gone for over 24 hours AND [2] symptoms worse or not improved    Negative: [1] Sinus pain (not just congestion) AND [2] fever    Negative: Earache    Negative: [1] SEVERE sore throat AND [2] present > 24 hours    Negative: [1] Sinus congestion (pressure, fullness) AND [2] present > 10 days    Negative: [1] Nasal discharge AND [2] present > 10 days    Negative: [1] Using nasal washes and pain medicine > 24 hours AND [2] sinus pain (lower forehead, cheekbone, or eye) persists    Negative: Sores with yellow scabs around the nasal opening    Negative: Cold with no complications (all triage questions negative)    Care advice for mild cough, questions about    Protocols used: COMMON COLD-ADULT-      Kate Billings, RN, BSN  [facility]

## 2017-10-16 LAB — LAB SCANNED RESULT: NORMAL

## 2017-10-18 ENCOUNTER — NURSE TRIAGE (OUTPATIENT)
Dept: NURSING | Facility: CLINIC | Age: 32
End: 2017-10-18

## 2017-10-18 NOTE — TELEPHONE ENCOUNTER
"Patient calling reporting waking in past few minutes with upper back pain that radiates into chest. Sharp upper back pain. Patient reporting pain when swallowing. \"Dull ache when coughing.\"  Denies cold symptoms today. Stating she is 13 weeks gestation.    Aparna Mcqueen RN  [facility]      "

## 2017-10-18 NOTE — TELEPHONE ENCOUNTER
Reason for Disposition    Chest pain lasts > 5 minutes (Exceptions: chest pain occurring > 3 days ago and now asymptomatic; same as previously diagnosed heartburn and has accompanying sour taste in mouth)    Additional Information    Negative: Passed out (i.e., lost consciousness, collapsed and was not responding)    Negative: Shock suspected (e.g., cold/pale/clammy skin, too weak to stand, low BP, rapid pulse)    Negative: Sounds like a life-threatening emergency to the triager    Negative: Major injury to the back (e.g., MVA, fall > 10 feet or 3 meters, penetrating injury, etc.)    Negative: Followed a tailbone injury    Negative: [1] Pain in the upper back over the ribs (rib cage) AND [2] radiates (travels, goes) into chest    Negative: [1] Pain in the upper back over the ribs (rib cage) AND [2] worsened by coughing (or clearly increases with breathing)    Back pain during pregnancy    Negative: Shock suspected (e.g., cold/pale/clammy skin, too weak to stand, low BP, rapid pulse)    Negative: SEVERE abdominal pain    Negative: Sounds like a life-threatening emergency to the triager    Negative: Major injury to the back (e.g., MVA, fall > 10 feet or 3 meters, penetrating injury, etc.)    Negative: Followed a tailbone injury    Negative: [1] Having contractions or other symptoms of labor AND [2] >= 37 weeks pregnant (i.e., term pregnancy)    Negative: [1] Having contractions or other symptoms of labor AND [2] < 37 weeks pregnant (i.e., )    Negative: [1] Abdominal pain AND [2] pregnant > 20 weeks    Negative: [1] Abdominal pain AND [2] pregnant < 20 weeks    Negative: [1] Pain in the upper back AND [2] overlies the rib cage    [1] Pain in the upper back AND [2] worsened by coughing (or clearly increases with breathing)    Negative: Severe difficulty breathing (e.g., struggling for each breath, speaks in single words)    Negative: Difficult to awaken or acting confused (e.g., disoriented, slurred speech)     "Negative: Shock suspected (e.g., cold/pale/clammy skin, too weak to stand, low BP, rapid pulse)    Negative: [1] Chest pain lasts > 5 minutes AND [2] history of heart disease  (i.e., heart attack, bypass surgery, angina, angioplasty, CHF; not just a heart murmur)    Negative: [1] Chest pain lasts > 5 minutes AND [2] described as crushing, pressure-like, or heavy    Negative: [1] Chest pain lasts > 5 minutes AND [2] age > 50    Negative: [1] Chest pain lasts > 5 minutes AND [2] age > 30 AND [3] at least one cardiac risk factor (i.e., hypertension, diabetes, obesity, smoker or strong family history of heart disease)    Negative: [1] Chest pain lasts > 5 minutes AND [2] not relieved with nitroglycerin    Negative: Passed out (i.e., lost consciousness, collapsed and was not responding)    Negative: Heart beating < 50 beats per minute OR > 140 beats per minute    Negative: Visible sweat on face or sweat dripping down face    Negative: Sounds like a life-threatening emergency to the triager    Negative: Followed a chest injury    Negative: SEVERE chest pain    Negative: [1] Intermittent  chest pain or \"angina\" AND [2] increasing in severity or frequency  (Exception: pains lasting a few seconds)    Negative: Pain also present in shoulder(s) or arm(s) or jaw  (Exception: pain is clearly made worse by movement)    Negative: Difficulty breathing    Negative: Dizziness or lightheadedness    Negative: Coughing up blood    Negative: Cocaine use within last 3 days    Negative: History of prior \"blood clot\" in leg or lungs (i.e., deep vein thrombosis, pulmonary embolism)    Negative: Recent illness requiring prolonged bedrest (i.e., immobilization)    Negative: Hip or leg fracture in past 2 months (e.g., had cast on leg or ankle)    Negative: Major surgery in the past month    Negative: Recent long-distance travel with prolonged time in car, bus, plane, or train (i.e., within past 2 weeks; 6 or  more hours duration)    Protocols " used: CHEST PAIN-ADULT-AH, BACK PAIN-ADULT-AH, PREGNANCY - BACK PAIN-ADULT-AH

## 2017-10-25 ENCOUNTER — OFFICE VISIT (OUTPATIENT)
Dept: OBGYN | Facility: CLINIC | Age: 32
End: 2017-10-25
Attending: ADVANCED PRACTICE MIDWIFE
Payer: COMMERCIAL

## 2017-10-25 VITALS
HEART RATE: 95 BPM | SYSTOLIC BLOOD PRESSURE: 115 MMHG | BODY MASS INDEX: 25.35 KG/M2 | WEIGHT: 161.5 LBS | HEIGHT: 67 IN | DIASTOLIC BLOOD PRESSURE: 72 MMHG

## 2017-10-25 DIAGNOSIS — Z23 NEED FOR PROPHYLACTIC VACCINATION AND INOCULATION AGAINST INFLUENZA: ICD-10-CM

## 2017-10-25 PROCEDURE — 99212 OFFICE O/P EST SF 10 MIN: CPT | Mod: 25,ZF

## 2017-10-25 PROCEDURE — 90686 IIV4 VACC NO PRSV 0.5 ML IM: CPT | Mod: ZF

## 2017-10-25 PROCEDURE — 25000128 H RX IP 250 OP 636: Mod: ZF

## 2017-10-25 PROCEDURE — G0008 ADMIN INFLUENZA VIRUS VAC: HCPCS | Mod: ZF

## 2017-10-25 NOTE — LETTER
"10/25/2017       RE: Susan Locke  [address]   [facility] [address]     Dear Colleague,    Thank you for referring your patient, Susan Locke, to the [facility] at [facility]. Please see a copy of my visit note below.    Subjective:     32 year old  at 14w0d presents for a routine prenatal appointment.      Denies vaginal bleeding or leakage of fluid.  Denies contractions or cramping. Feels occasional fetal movement.       No HA, visual changes, RUQ or epigastric pain.   The patient presents with the following concerns: concerns regarding weight gain; nausea has improved, but continues to feel metallic taste in mouth.  Level II US  Scheduled.   Briefly reviewed option for AFP at next visit.     Objective:  Vitals:    10/25/17 1140   BP: 115/72   BP Location: Left arm   Patient Position: Chair   Pulse: 95   Weight: 73.3 kg (161 lb 8 oz)   Height: 1.702 m (5' 7\")   See OB flowsheet    Assessment/Plan:  Encounter Diagnosis   Name Primary?     Need for prophylactic vaccination and inoculation against influenza      Orders Placed This Encounter   Procedures     HC FLU VAC PRESRV FREE QUAD SPLIT VIR 3+YRS IM     - Discussed hard candies/cecy candies to improve symptoms.  - Reviewed total weight gain, encouraged continued healthy diet and exercise.      - Reviewed why/how to contact provider.   - Patient education/orders or handouts today: PTL signs/symptoms, Fetal movement and Level 2 u/s scheduled  - Return to clinic in 4-5 weeks and prn if questions or concerns.     Again, thank you for allowing me to participate in the care of your patient.      Sincerely,    LEANDRA Pugh CNM      "

## 2017-10-25 NOTE — PROGRESS NOTES
"SUBJECTIVE:    32 year old, female, , 14w0d,  who presents to the clinic today for a new ob visit.    Feels well. Has *** started PNV.  Estimated Date of Delivery:  is calculated from Patient's last menstrual period was 2017 (exact date)..    ***  She {HAS BLEEDIN::\"has not had bleeding since her LMP\"}.   She {NAUSEA:823161::\"has had mild nausea.\"} Weight loss {WEIGHT LOSS:703999::\"has not occurred.\"}   This {WAS/WAS NOT:9033::\"was\"} a planned pregnancy.   FOB {isnot:710282::\"is\"} involved,  ***   {MARITAL STATUS (OB):547378}  OTHER CONCERNS: ***    ===========================================  ROS  PSYCHIATRIC:  Denies {PSYCH OB:260950}  PHQ9: Last PHQ-9 score on record= No Value exists for the : HP#PHQ9  Social History   Substance Use Topics     Smoking status: Never Smoker     Smokeless tobacco: Never Used     Alcohol use Yes     History   Drug Use No     History   Smoking Status     Never Smoker   Smokeless Tobacco     Never Used       Alcohol use Yes     Family History   Problem Relation Age of Onset     Hypothyroidism Mother      Lung Cancer Father      Stomach Cancer Maternal Grandmother      Thyroid Disease Sister      ============================================  MEDICAL HISTORY   No Known Allergies    [unfilled]      Current Outpatient Prescriptions:      VITAMIN D, CHOLECALCIFEROL, PO, Take 5,000 Units by mouth daily, Disp: , Rfl:      Pyridoxine HCl (VITAMIN B6 PO), Take 25 mg by mouth 3 times daily, Disp: , Rfl:      Prenatal Vit-Fe Fumarate-FA (PRENATAL VITAMIN PO), , Disp: , Rfl:      Emollient (CERAVE) CREA, Apply once daily to the trunk and extremities to damp skin (after showering and patting dry, not rubbing). (Patient not taking: Reported on 2017), Disp: 340 g, Rfl: 11    Past Medical History:   Diagnosis Date     Arthritis      Dermatitis 2017     H. pylori infection      Hay fever      Rheumatism        No past surgical history on " "file.         Obstetric History       T0      L0     SAB0   TAB0   Ectopic0   Multiple0   Live Births0       # Outcome Date GA Lbr Goyo/2nd Weight Sex Delivery Anes PTL Lv   1 Current                   GYN History- *** Abnormal Pap Smears                        Cervical procedures: ***                        History of STI: ***    I personally reviewed the past social/family/medical and surgical history on the date of service.   I reviewed lab work done at Intake visit with patient.    OBJECTIVE:   PHYSICAL EXAM:  /72 (BP Location: Left arm, Patient Position: Chair)  Pulse 95  Ht 1.702 m (5' 7\")  Wt 73.3 kg (161 lb 8 oz)  LMP 2017 (Exact Date)  Breastfeeding? No  BMI 25.29 kg/m2  BMI- Body mass index is 25.29 kg/(m^2)., GENERAL:  Pleasant pregnant female, alert, cooperative *** and well groomed.  SKIN:  Warm and dry, without lesions or rashes  HEAD: Symmetrical features.  MOUTH:  Buccal mucosa pink, moist without lesions.  Teeth in {GOOD/FAIR/POOR:111166::\"good\"} repair.    NECK:  Thyroid without enlargement and nodules.  Lymph nodes not palpable. ***  LUNGS:  Clear to auscultation.  BREAST:    No dominant, fixed or suspicious masses are noted.  No skin or nipple changes or axillary nodes.   Nipples {NIPPLES:431449::\"everted\"}.      HEART:  RRR without murmur.  ABDOMEN: Soft without masses , tenderness or organomegaly.  No CVA tenderness.  Uterus {UTERUS:793355} at size equal to dates.  {ABD OB:811464}. Fetal heart tones present.  MUSCULOSKELETAL:  Full range of motion  EXTREMITIES:  No edema. No significant varicosities.   PELVIC EXAM:  GENITALIA: EGBUS  External genitalia, Bartholin's glands, urethra & [place]on's glands:normal. Vulva reveals no erythema or lesions.  ***       VAGINA:  pink, normal rugae and discharge, no lesions, good tone. ***  CERVIX:  smooth, without discharge or CMT. ***               UTERUS: {UTERINE POSITION:051553::\"Anteverted\"},  nontender *** week size. " "  ADNEXA:  Without masses or tenderness. ***  RECTAL:  Normal appearance.  Digital exam deferred.  WET PREP:{VAGINAL WET PREP:932301::\"Not done\"}  GC/CHLAMYDIA CULTURE OBTAINED:{YES/ NO (recomended):160995::\"PATIENT DECLINED\"}    ASSESSMENT:  Intrauterine pregnancy 14w0d size *** consistent with dates  Genetic Screening: {WHSGENETICSCREENIN}  Encounter Diagnosis   Name Primary?     Need for prophylactic vaccination and inoculation against influenza         PLAN:  Orders Placed This Encounter   Procedures     HC FLU VAC PRESRV FREE QUAD SPLIT VIR 3+YRS IM     No orders of the defined types were placed in this encounter.    - Reviewed use of triage nurse line and contacting the on-call provider after hours for an urgent need such as fever, vagina bleeding, bladder or vaginal infection, rupture of membranes,  or term labor.    - Reviewed best evidence for: weight gain for her weight and height for pregnancy:  RECOMMENDED WEIGHT GAIN: {WGT GAIN:442525}   healthy diet and foods to avoid; exercise and activity during pregnancy;avoiding exposure to toxoplasmosis; and maintenance of a generally healthy lifestyle.   - Discussed the harms, benefits, side effects and alternative therapies for current prescribed and OTC medications.    - All pt's and FOB's *** questions discussed and answered.  Pt verbalized understanding of and agreement to plan of care.     - Continue scheduled prenatal care and prn if questions or concerns    LEANDRA Pugh CNM               "

## 2017-10-25 NOTE — PROGRESS NOTES
"Subjective:     32 year old  at 14w0d presents for a routine prenatal appointment.      Denies vaginal bleeding or leakage of fluid.  Denies contractions or cramping. Feels occasional fetal movement.       No HA, visual changes, RUQ or epigastric pain.   The patient presents with the following concerns: concerns regarding weight gain; nausea has improved, but continues to feel metallic taste in mouth.  Level II US  Scheduled.   Briefly reviewed option for AFP at next visit.     Objective:  Vitals:    10/25/17 1140   BP: 115/72   BP Location: Left arm   Patient Position: Chair   Pulse: 95   Weight: 73.3 kg (161 lb 8 oz)   Height: 1.702 m (5' 7\")   See OB flowsheet    Assessment/Plan:  Encounter Diagnosis   Name Primary?     Need for prophylactic vaccination and inoculation against influenza      Orders Placed This Encounter   Procedures     HC FLU VAC PRESRV FREE QUAD SPLIT VIR 3+YRS IM     - Discussed hard candies/cecy candies to improve symptoms.  - Reviewed total weight gain, encouraged continued healthy diet and exercise.      - Reviewed why/how to contact provider.   - Patient education/orders or handouts today: PTL signs/symptoms, Fetal movement and Level 2 u/s scheduled  - Return to clinic in 4-5 weeks and prn if questions or concerns.   "

## 2017-10-25 NOTE — MR AVS SNAPSHOT
After Visit Summary   10/25/2017    Susan Locke    MRN: [id number]           Patient Information     Date Of Birth          1985        Visit Information        Provider Department      10/25/2017 11:30 AM Kevin Ponce APRN CN [facility]        Today's Diagnoses     Need for prophylactic vaccination and inoculation against influenza           Follow-ups after your visit        Follow-up notes from your care team     Return in about 4 weeks (around 11/22/2017).      Your next 10 appointments already scheduled     Nov 22, 2017 11:00 AM CST   RETURN OB with LEANDRA Judd CNM   [facility] ([email or web address] Clinics)    [facility] [address]  [address]  [facility] [address] [phone]            Nov 29, 2017  8:00 AM CST   BONNY US COMP with [id number]   [facility])    [address]  [facility] [id number] [phone]           Wear comfortable clothes and leave your valuables at home.            Nov 29, 2017  8:30 AM CST   Radiology MD with MINA DRAPER MD   [facility])    [address]  [facility] [address] [phone]           Please arrive at the time given for your first appointment. This visit is used internally to schedule the physician's time during your ultrasound.              Who to contact     Please call your clinic at [phone] to:    Ask questions about your health    Make or cancel appointments    Discuss your medicines    Learn about your test results    Speak to your doctor   If you have compliments or concerns about an experience at your clinic, or if you wish to file a complaint, please contact [facility] at [phone] or email us at  [email or web address]         Additional Information About Your Visit        [employer] Information     [employer] gives you secure access to your electronic health record. If you see a primary care provider, you can also send messages to your care team and make appointments. If you have questions, please call your primary care clinic.  If you do not have a primary care provider, please call [phone] and they will assist you.      [employer] is an electronic gateway that provides easy, online access to your medical records. With [employer], you can request a clinic appointment, read your test results, renew a prescription or communicate with your care team.     To access your existing account, please contact your [facility] or call [phone] for assistance.        Care EveryWhere ID     This is your Care EveryWhere ID. This could be used by other organizations to access your [place] medical records  [id number]        Your Vitals Were     Pulse Height Last Period Breastfeeding? BMI (Body Mass Index)       95 1.702 m (5' 7\") 07/19/2017 (Exact Date) No 25.29 kg/m2        Blood Pressure from Last 3 Encounters:   10/25/17 115/72   09/27/17 115/77   09/13/17 124/81    Weight from Last 3 Encounters:   10/25/17 73.3 kg (161 lb 8 oz)   09/27/17 72.6 kg (160 lb 1.6 oz)   09/13/17 72 kg (158 lb 11.2 oz)              We Performed the Following     HC FLU VAC PRESRV FREE QUAD SPLIT VIR 3+YRS IM        Primary Care Provider    None Specified       No primary provider on file.        Equal Access to Services     JORGE SHEN : Hadii delio harrison Soken, waaxda luqadaha, qaybta kaalmada trevor, amy field . So [facility] [phone].    ATENCIÓN: Si habla español, tiene a carrera disposición servicios gratuitos de asistencia lingüística. Llame al [phone].    We comply with applicable federal civil rights laws and [place] laws. We do not discriminate on the " basis of race, color, national origin, age, disability, sex, sexual orientation, or gender identity.            Thank you!     Thank you for choosing [facility]  for your care. Our goal is always to provide you with excellent care. Hearing back from our patients is one way we can continue to improve our services. Please take a few minutes to complete the written survey that you may receive in the mail after your visit with us. Thank you!             Your Updated Medication List - Protect others around you: Learn how to safely use, store and throw away your medicines at www.disposemymeds.org.          This list is accurate as of: 10/25/17 12:10 PM.  Always use your most recent med list.                   Brand Name Dispense Instructions for use Diagnosis    CERAVE Crea     340 g    Apply once daily to the trunk and extremities to damp skin (after showering and patting dry, not rubbing).    Dermatitis       PRENATAL VITAMIN PO           VITAMIN B6 PO      Take 25 mg by mouth 3 times daily        VITAMIN D (CHOLECALCIFEROL) PO      Take 5,000 Units by mouth daily

## 2017-11-01 ENCOUNTER — TELEPHONE (OUTPATIENT)
Dept: OBGYN | Facility: CLINIC | Age: 32
End: 2017-11-01

## 2017-11-01 NOTE — TELEPHONE ENCOUNTER
Spoke with Susan who is looking for completion of FMLA forms. She is 15 0/7 weeks pregnant. She reports her employer is requiring this to be completed now for her 3 month leave after delivery (ERIC 4/25/18).    Advised to refax the form to [phone] ATTN: Tata. She agreed with plan.

## 2017-11-01 NOTE — TELEPHONE ENCOUNTER
----- Message from Milla Martel RN sent at 10/31/2017  4:56 PM CDT -----  Regarding: [facility] paperwork  Contact: [phone]  Calling to find out if [facility] papers arrived and completed

## 2017-11-01 NOTE — TELEPHONE ENCOUNTER
Spoke with HR rep, Tamara Mcdonald. She states her company DOES require the paperwork to be completed when they are notified of an upcoming leave, even if the leave is several months in the future.    Completed FMLA form and faxed to Tamara Mcdonald at [phone].    Pt notified of status.

## 2017-11-01 NOTE — TELEPHONE ENCOUNTER
Received fax. Call  Tamara Mcdonald ([phone]) to clarify that they are requiring [facility] paperwork to completed at this time for leave that is anticipated to occur in April 2018.

## 2017-11-22 ENCOUNTER — OFFICE VISIT (OUTPATIENT)
Dept: OBGYN | Facility: CLINIC | Age: 32
End: 2017-11-22
Attending: ADVANCED PRACTICE MIDWIFE
Payer: COMMERCIAL

## 2017-11-22 VITALS
WEIGHT: 166 LBS | HEIGHT: 67 IN | DIASTOLIC BLOOD PRESSURE: 70 MMHG | BODY MASS INDEX: 26.06 KG/M2 | SYSTOLIC BLOOD PRESSURE: 116 MMHG

## 2017-11-22 DIAGNOSIS — Z34.02 NORMAL FIRST PREGNANCY CONFIRMED, CURRENTLY IN SECOND TRIMESTER: Primary | ICD-10-CM

## 2017-11-22 PROBLEM — L29.9 PRURITUS: Status: RESOLVED | Noted: 2017-06-12 | Resolved: 2017-11-22

## 2017-11-22 PROBLEM — O20.9 BLEEDING IN EARLY PREGNANCY: Status: RESOLVED | Noted: 2017-06-26 | Resolved: 2017-11-22

## 2017-11-22 PROBLEM — M54.42 ACUTE LEFT-SIDED LOW BACK PAIN WITH LEFT-SIDED SCIATICA: Status: RESOLVED | Noted: 2017-09-28 | Resolved: 2017-11-22

## 2017-11-22 RX ORDER — PYRIDOXINE HCL (VITAMIN B6) 25 MG
25 TABLET ORAL DAILY
COMMUNITY
End: 2019-08-23

## 2017-11-22 ASSESSMENT — PAIN SCALES - GENERAL: PAINLEVEL: NO PAIN (0)

## 2017-11-22 NOTE — MR AVS SNAPSHOT
After Visit Summary   11/22/2017    Susan Locke    MRN: [id number]           Patient Information     Date Of Birth          1985        Visit Information        Provider Department      11/22/2017 11:00 AM Brit Garcia APRN CN [facility]        Today's Diagnoses     Normal first pregnancy confirmed, currently in second trimester    -  1       Follow-ups after your visit        Follow-up notes from your care team     Return in about 4 weeks (around 12/20/2017) for ROMELIA.      Your next 10 appointments already scheduled     Nov 29, 2017  8:00 AM CST   BONNY US COMP with [id number]   [facility] - [place] ([facility])    [address]  [facility] [address] [phone]           Wear comfortable clothes and leave your valuables at home.            Nov 29, 2017  8:30 AM CST   Radiology MD with UR BONNY OWENS   [facility]    [address]  [facility] [id number] [phone]           Please arrive at the time given for your first appointment. This visit is used internally to schedule the physician's time during your ultrasound.            Dec 20, 2017  8:15 AM CST   RETURN OB with Vivienne Ahn CNM   [facility] ([facility])    [place] [facility] [address]  [address]  [address]  [facility] [id number] [phone]              Who to contact     Please call your clinic at [phone] to:    Ask questions about your health    Make or cancel appointments    Discuss your medicines    Learn about your test results    Speak to your doctor   If you have compliments or concerns about an experience at your clinic, or if you wish to file a complaint, please contact [facility] Patient Relations at [phone] or email us at  "[email or web address]         Additional Information About Your Visit        YellowBrckharcarmen Information     Happy Cloud gives you secure access to your electronic health record. If you see a primary care provider, you can also send messages to your care team and make appointments. If you have questions, please call your primary care clinic.  If you do not have a primary care provider, please call [phone] and they will assist you.      Happy Cloud is an electronic gateway that provides easy, online access to your medical records. With Happy Cloud, you can request a clinic appointment, read your test results, renew a prescription or communicate with your care team.     To access your existing account, please contact your [facility] or call [phone] for assistance.        Care EveryWhere ID     This is your Care EveryWhere ID. This could be used by other organizations to access your [place] medical records  [id number]        Your Vitals Were     Height Last Period BMI (Body Mass Index)             1.702 m (5' 7.01\") 07/19/2017 (Exact Date) 25.99 kg/m2          Blood Pressure from Last 3 Encounters:   11/22/17 116/70   10/25/17 115/72   09/27/17 115/77    Weight from Last 3 Encounters:   11/22/17 75.3 kg (166 lb)   10/25/17 73.3 kg (161 lb 8 oz)   09/27/17 72.6 kg (160 lb 1.6 oz)              Today, you had the following     No orders found for display         Today's Medication Changes          These changes are accurate as of: 11/22/17 11:46 AM.  If you have any questions, ask your nurse or doctor.               These medicines have changed or have updated prescriptions.        Dose/Directions    pyridOXINE 25 MG tablet   Commonly known as:  vitamin B-6   This may have changed:  Another medication with the same name was removed. Continue taking this medication, and follow the directions you see here.        Dose:  25 mg   Take 25 mg by mouth daily   Refills:  0         Stop taking " these medicines if you haven't already. Please contact your care team if you have questions.     WILLIAM Arireta                    Primary Care Provider    None Specified       No primary provider on file.        Equal Access to Services     JORGE SHEN : Cristiane Peres, sowmya gutierrez, geetaorestes leoalienadiya murray, amy ruff haymiracle parishshanicediana magdaleno. So [facility] [phone].    ATENCIÓN: Si habla español, tiene a carrera disposición servicios gratuitos de asistencia lingüística. Llame al [phone].    We comply with applicable federal civil rights laws and [place] laws. We do not discriminate on the basis of race, color, national origin, age, disability, sex, sexual orientation, or gender identity.            Thank you!     Thank you for choosing [facility]  for your care. Our goal is always to provide you with excellent care. Hearing back from our patients is one way we can continue to improve our services. Please take a few minutes to complete the written survey that you may receive in the mail after your visit with us. Thank you!             Your Updated Medication List - Protect others around you: Learn how to safely use, store and throw away your medicines at www.disposemymeds.org.          This list is accurate as of: 11/22/17 11:46 AM.  Always use your most recent med list.                   Brand Name Dispense Instructions for use Diagnosis    PRENATAL VITAMIN PO           pyridOXINE 25 MG tablet    vitamin B-6     Take 25 mg by mouth daily        VITAMIN D (CHOLECALCIFEROL) PO      Take 5,000 Units by mouth daily

## 2017-11-22 NOTE — PROGRESS NOTES
"Subjective:      32 year old  at 18w0d presents for a routine prenatal appointment.       Denies vaginal bleeding or leakage of fluid.  Denies contractions or cramping.    Is uncertain if she is feeling fetal movement yet.  Quickening discussed       No HA, visual changes, RUQ or epigastric pain.   The patient presents with the following concerns: Has decided to wait on US next week to determine if she will want the AFP.  Information given on child birth education and [employer].        Objective:  Vitals:    17 1118   BP: 116/70   Weight: 75.3 kg (166 lb)   Height: 1.702 m (5' 7.01\")     See OB flowsheet    Assessment/Plan     Encounter Diagnosis   Name Primary?     Normal first pregnancy confirmed, currently in second trimester Yes     No orders of the defined types were placed in this encounter.    Orders Placed This Encounter   Medications     pyridOXINE (VITAMIN  B-6) 25 MG tablet     Sig: Take 25 mg by mouth daily       - Reviewed total weight gain, encouraged continued healthy diet and exercise.      - Reviewed why/how to contact provider.    Patient education/orders or handouts today:  fetal movement and level 2 u/s scheduled   Return to clinic in 4 weeks and prn if questions or concerns.   LEANDRA Judd CNM              "

## 2017-11-22 NOTE — NURSING NOTE
Chief Complaint   Patient presents with     Prenatal Care   18 week ob visit feeling well- level 2 ultrasound next week.

## 2017-11-22 NOTE — LETTER
"2017     RE: Susan Locke  [address]   [facility] [address]     Dear Colleague,    Thank you for referring your patient, Susan Locke, to the [facility] at [facility]. Please see a copy of my visit note below.    Subjective:      32 year old  at 18w0d presents for a routine prenatal appointment.       Denies vaginal bleeding or leakage of fluid.  Denies contractions or cramping.    Is uncertain if she is feeling fetal movement yet.  Quickening discussed       No HA, visual changes, RUQ or epigastric pain.   The patient presents with the following concerns: Has decided to wait on US next week to determine if she will want the AFP.  Information given on child birth education and [employer].        Objective:  Vitals:    17 1118   BP: 116/70   Weight: 75.3 kg (166 lb)   Height: 1.702 m (5' 7.01\")     See OB flowsheet    Assessment/Plan     Encounter Diagnosis   Name Primary?     Normal first pregnancy confirmed, currently in second trimester Yes     No orders of the defined types were placed in this encounter.    Orders Placed This Encounter   Medications     pyridOXINE (VITAMIN  B-6) 25 MG tablet     Sig: Take 25 mg by mouth daily     - Reviewed total weight gain, encouraged continued healthy diet and exercise.      - Reviewed why/how to contact provider.    Patient education/orders or handouts today:  fetal movement and level 2 u/s scheduled   Return to clinic in 4 weeks and prn if questions or concerns.   LEANDRA Judd CNM    "

## 2017-11-29 ENCOUNTER — HOSPITAL ENCOUNTER (OUTPATIENT)
Dept: ULTRASOUND IMAGING | Facility: CLINIC | Age: 32
Discharge: HOME | End: 2017-11-29
Attending: ADVANCED PRACTICE MIDWIFE | Admitting: OBSTETRICS & GYNECOLOGY
Payer: COMMERCIAL

## 2017-11-29 ENCOUNTER — OFFICE VISIT (OUTPATIENT)
Dept: MATERNAL FETAL MEDICINE | Facility: CLINIC | Age: 32
End: 2017-11-29
Attending: ADVANCED PRACTICE MIDWIFE
Payer: COMMERCIAL

## 2017-11-29 DIAGNOSIS — Z36.3 ENCOUNTER FOR ROUTINE SCREENING FOR MALFORMATION USING ULTRASONICS: Primary | ICD-10-CM

## 2017-11-29 DIAGNOSIS — O26.90 PREGNANCY RELATED CONDITION, UNSPECIFIED TRIMESTER: ICD-10-CM

## 2017-11-29 PROCEDURE — 76805 OB US >/= 14 WKS SNGL FETUS: CPT

## 2017-11-29 NOTE — PROGRESS NOTES
"Please see \"Imaging\" tab under \"Chart Review\" for details of today's US.    Adriana Chavarria, DO    "

## 2017-11-29 NOTE — MR AVS SNAPSHOT
After Visit Summary   11/29/2017    Susan Locke    MRN: [id number]           Patient Information     Date Of Birth          1985        Visit Information        Provider Department      11/29/2017 8:30 AM Adriana Chavarria,  [facility] Maternal [facility]etal [facility]        Today's Diagnoses     Encounter for routine screening for malformation using ultrasonics    -  1       Follow-ups after your visit        Your next 10 appointments already scheduled     Dec 20, 2017  8:15 AM CST   RETURN OB with Vivienne Ahn CNM   [facility] ([facility])    [place] [facility] [address]  [address]  [address]  [facility] [address] [phone]              Future tests that were ordered for you today     Open Future Orders        Priority Expected Expires Ordered    Maternal Fetal OB Complete 2/3 Tri Sngle Routine  7/27/2018 9/27/2017            Who to contact     If you have questions or need follow up information about today's clinic visit or your schedule please contact [facility] directly at [phone].  Normal or non-critical lab and imaging results will be communicated to you by Yogiyohart, letter or phone within 4 business days after the clinic has received the results. If you do not hear from us within 7 days, please contact the clinic through Yogiyohart or phone. If you have a critical or abnormal lab result, we will notify you by phone as soon as possible.  Submit refill requests through BeTheBeast or call your pharmacy and they will forward the refill request to us. Please allow 3 business days for your refill to be completed.          Additional Information About Your Visit        Yogiyohart Information     BeTheBeast gives you secure access to your electronic health record. If you see a primary care provider, you can also send messages to your care team and make appointments. If you have  questions, please call your primary care clinic.  If you do not have a primary care provider, please call [phone] and they will assist you.        Care EveryWhere ID     This is your Care EveryWhere ID. This could be used by other organizations to access your [place] medical records  [id number]        Your Vitals Were     Last Period                   07/19/2017 (Exact Date)            Blood Pressure from Last 3 Encounters:   11/22/17 116/70   10/25/17 115/72   09/27/17 115/77    Weight from Last 3 Encounters:   11/22/17 75.3 kg (166 lb)   10/25/17 73.3 kg (161 lb 8 oz)   09/27/17 72.6 kg (160 lb 1.6 oz)              Today, you had the following     No orders found for display       Primary Care Provider    None Specified       No primary provider on file.        Equal Access to Services     JORGE SHEN : Cristiane Peres, sowmya gutierrez, ashely murray, amy field . So [facility] [phone].    ATENCIÓN: Si habla español, tiene a carrera disposición servicios gratuitos de asistencia lingüística. Abdirahman al [phone].    We comply with applicable federal civil rights laws and [place] laws. We do not discriminate on the basis of race, color, national origin, age, disability, sex, sexual orientation, or gender identity.            Thank you!     Thank you for choosing [facility]  for your care. Our goal is always to provide you with excellent care. Hearing back from our patients is one way we can continue to improve our services. Please take a few minutes to complete the written survey that you may receive in the mail after your visit with us. Thank you!             Your Updated Medication List - Protect others around you: Learn how to safely use, store and throw away your medicines at www.disposemymeds.org.          This list is accurate as of: 11/29/17  9:06 AM.  Always use your most recent med list.                   Brand  Name Dispense Instructions for use Diagnosis    PRENATAL VITAMIN PO           pyridOXINE 25 MG tablet    vitamin B-6     Take 25 mg by mouth daily        VITAMIN D (CHOLECALCIFEROL) PO      Take 5,000 Units by mouth daily

## 2017-12-20 ENCOUNTER — OFFICE VISIT (OUTPATIENT)
Dept: OBGYN | Facility: CLINIC | Age: 32
End: 2017-12-20
Attending: ADVANCED PRACTICE MIDWIFE
Payer: COMMERCIAL

## 2017-12-20 VITALS
HEIGHT: 67 IN | SYSTOLIC BLOOD PRESSURE: 113 MMHG | DIASTOLIC BLOOD PRESSURE: 74 MMHG | BODY MASS INDEX: 27.14 KG/M2 | HEART RATE: 80 BPM | WEIGHT: 172.9 LBS

## 2017-12-20 DIAGNOSIS — Z34.02 ENCOUNTER FOR SUPERVISION OF NORMAL FIRST PREGNANCY IN SECOND TRIMESTER: Primary | ICD-10-CM

## 2017-12-20 PROBLEM — Z00.6 RESEARCH STUDY PATIENT: Status: ACTIVE | Noted: 2017-12-20

## 2017-12-20 NOTE — LETTER
"2017       RE: Susan Locke  [address]   [place] [facility] [place] [address]     Dear Colleague,    Thank you for referring your patient, Susan Locke, to the [facility] at [facility]. Please see a copy of my visit note below.    Subjective:      32 year old  at 22w0d presents for a routine prenatal appointment.         Denies cramping/contractions, vaginal bleeding or leakage of fluid.  Reports +fetal movement.       No HA, visual changes, RUQ or epigastric pain.     Patient concerns: Feeling well overall. Had level 2 ultrasound- having a girl!  Has CBE class (natural birth) scheduled with [place] in March. Scheduled BP tour for end of January.   Experiencing some ezcema on back of right hand- improves with coconut oil. Not interested in trying low dose hydrocortisone cream.   Has also had some constipation- using miralax. Open to suggestions of increased fluids, fiber, activity.    Objective:  Vitals:    17 0822   BP: 113/74   Pulse: 80   Weight: 78.4 kg (172 lb 14.4 oz)   Height: 1.702 m (5' 7.01\")       See OB flowsheet    Assessment/Plan     Encounter Diagnosis   Name Primary?     Encounter for supervision of normal first pregnancy in second trimester Yes     - Reviewed total weight gain, encouraged continued healthy diet and exercise.      - Reviewed why/how to contact provider.      Patient education/orders or handouts today:  PTL signs/symptoms, Fetal movement and Plan for EOB visit w labs     Reviewed level 2 ultrasound.  EOB next visit.  Continue scheduled prenatal care, RTC in 5 weeks for EOB and prn if questions or concerns.     LEANDRA Chicas, CNJUANI    "

## 2017-12-20 NOTE — MR AVS SNAPSHOT
After Visit Summary   12/20/2017    Susan Locke    MRN: [id number]           Patient Information     Date Of Birth          1985        Visit Information        Provider Department      12/20/2017 8:15 AM Vivienne Ahn CNM [facility]        Today's Diagnoses     Encounter for supervision of normal first pregnancy in second trimester    -  1       Follow-ups after your visit        Follow-up notes from your care team     Return in about 5 weeks (around 1/24/2018) for MURALI MELVIN.      Your next 10 appointments already scheduled     Jan 24, 2018  8:15 AM CST   Return Obstetrics Extended with LEANDRA Lopez CNM   [facility] ([facility])    [place] [facility] [address]  [address]  [address]  [facility] [address] [phone]              Who to contact     Please call your clinic at [phone] to:    Ask questions about your health    Make or cancel appointments    Discuss your medicines    Learn about your test results    Speak to your doctor   If you have compliments or concerns about an experience at your clinic, or if you wish to file a complaint, please contact [facility] Patient Relations at [phone] or email us at [email or web address]         Additional Information About Your Visit        MyChart Information     Bellyt gives you secure access to your electronic health record. If you see a primary care provider, you can also send messages to your care team and make appointments. If you have questions, please call your primary care clinic.  If you do not have a primary care provider, please call [phone] and they will assist you.      HipLogic is an electronic gateway that provides easy, online access to your medical records. With HipLogic, you can request a clinic appointment, read your test results, renew a prescription or communicate with  "your care team.     To access your existing account, please contact your [facility] or call [phone] for assistance.        Care EveryWhere ID     This is your Care EveryWhere ID. This could be used by other organizations to access your [place] medical records  [id number]        Your Vitals Were     Pulse Height Last Period BMI (Body Mass Index)          80 1.702 m (5' 7.01\") 07/19/2017 (Exact Date) 27.07 kg/m2         Blood Pressure from Last 3 Encounters:   12/20/17 113/74   11/22/17 116/70   10/25/17 115/72    Weight from Last 3 Encounters:   12/20/17 78.4 kg (172 lb 14.4 oz)   11/22/17 75.3 kg (166 lb)   10/25/17 73.3 kg (161 lb 8 oz)              Today, you had the following     No orders found for display       Primary Care Provider    None Specified       No primary provider on file.        Equal Access to Services     JORGE HSEN : Hadpavan Peres, sowmya gutierrez, ashely murray, amy field . So [facility] [phone].    ATENCIÓN: Si habla español, tiene a carrera disposición servicios gratuitos de asistencia lingüística. Llame al [phone].    We comply with applicable federal civil rights laws and [place] laws. We do not discriminate on the basis of race, color, national origin, age, disability, sex, sexual orientation, or gender identity.            Thank you!     Thank you for choosing [facility]  for your care. Our goal is always to provide you with excellent care. Hearing back from our patients is one way we can continue to improve our services. Please take a few minutes to complete the written survey that you may receive in the mail after your visit with us. Thank you!             Your Updated Medication List - Protect others around you: Learn how to safely use, store and throw away your medicines at www.disposemymeds.org.          This list is accurate as of: 12/20/17  8:52 AM.  Always use " your most recent med list.                   Brand Name Dispense Instructions for use Diagnosis    PRENATAL VITAMIN PO           pyridOXINE 25 MG tablet    vitamin B-6     Take 25 mg by mouth daily        VITAMIN D (CHOLECALCIFEROL) PO      Take 5,000 Units by mouth daily

## 2017-12-20 NOTE — PROGRESS NOTES
"Subjective:      32 year old  at 22w0d presents for a routine prenatal appointment.         Denies cramping/contractions, vaginal bleeding or leakage of fluid.  Reports +fetal movement.       No HA, visual changes, RUQ or epigastric pain.     Patient concerns: Feeling well overall. Had level 2 ultrasound- having a girl!  Has CBE class (natural birth) scheduled with [place] in March. Scheduled BP tour for end of January.   Experiencing some ezcema on back of right hand- improves with coconut oil. Not interested in trying low dose hydrocortisone cream.   Has also had some constipation- using miralax. Open to suggestions of increased fluids, fiber, activity.    Objective:  Vitals:    17 0822   BP: 113/74   Pulse: 80   Weight: 78.4 kg (172 lb 14.4 oz)   Height: 1.702 m (5' 7.01\")       See OB flowsheet    Assessment/Plan     Encounter Diagnosis   Name Primary?     Encounter for supervision of normal first pregnancy in second trimester Yes     - Reviewed total weight gain, encouraged continued healthy diet and exercise.      - Reviewed why/how to contact provider.      Patient education/orders or handouts today:  PTL signs/symptoms, Fetal movement and Plan for EOB visit w labs     Reviewed level 2 ultrasound.  EOB next visit.  Continue scheduled prenatal care, RTC in 5 weeks for EOB and prn if questions or concerns.     LEANDRA Chicas, NGOZI                "

## 2018-01-24 ENCOUNTER — OFFICE VISIT (OUTPATIENT)
Dept: OBGYN | Facility: CLINIC | Age: 33
End: 2018-01-24
Attending: ADVANCED PRACTICE MIDWIFE
Payer: COMMERCIAL

## 2018-01-24 VITALS
SYSTOLIC BLOOD PRESSURE: 115 MMHG | WEIGHT: 179.3 LBS | HEIGHT: 67 IN | BODY MASS INDEX: 28.14 KG/M2 | DIASTOLIC BLOOD PRESSURE: 76 MMHG | HEART RATE: 91 BPM

## 2018-01-24 DIAGNOSIS — Z34.03 ENCOUNTER FOR SUPERVISION OF NORMAL FIRST PREGNANCY IN THIRD TRIMESTER: Primary | ICD-10-CM

## 2018-01-24 PROBLEM — Z34.90 SUPERVISION OF NORMAL PREGNANCY: Status: ACTIVE | Noted: 2017-09-13

## 2018-01-24 LAB
DEPRECATED CALCIDIOL+CALCIFEROL SERPL-MC: 33 UG/L (ref 20–75)
ERYTHROCYTE [DISTWIDTH] IN BLOOD BY AUTOMATED COUNT: 13.4 % (ref 10–15)
GLUCOSE 1H P 50 G GLC PO SERPL-MCNC: 125 MG/DL (ref 60–129)
HCT VFR BLD AUTO: 36.4 % (ref 35–47)
HCV AB SERPL QL IA: NONREACTIVE
HGB BLD-MCNC: 12.3 G/DL (ref 11.7–15.7)
MCH RBC QN AUTO: 32.5 PG (ref 26.5–33)
MCHC RBC AUTO-ENTMCNC: 33.8 G/DL (ref 31.5–36.5)
MCV RBC AUTO: 96 FL (ref 78–100)
PLATELET # BLD AUTO: 200 10E9/L (ref 150–450)
RBC # BLD AUTO: 3.78 10E12/L (ref 3.8–5.2)
T PALLIDUM IGG+IGM SER QL: NEGATIVE
WBC # BLD AUTO: 7.8 10E9/L (ref 4–11)

## 2018-01-24 PROCEDURE — 25000128 H RX IP 250 OP 636: Mod: ZF

## 2018-01-24 PROCEDURE — 86780 TREPONEMA PALLIDUM: CPT | Performed by: ADVANCED PRACTICE MIDWIFE

## 2018-01-24 PROCEDURE — 90471 IMMUNIZATION ADMIN: CPT | Mod: ZF

## 2018-01-24 PROCEDURE — 85027 COMPLETE CBC AUTOMATED: CPT | Performed by: ADVANCED PRACTICE MIDWIFE

## 2018-01-24 PROCEDURE — 90715 TDAP VACCINE 7 YRS/> IM: CPT | Mod: ZF

## 2018-01-24 PROCEDURE — 82306 VITAMIN D 25 HYDROXY: CPT | Performed by: ADVANCED PRACTICE MIDWIFE

## 2018-01-24 PROCEDURE — 86803 HEPATITIS C AB TEST: CPT | Performed by: ADVANCED PRACTICE MIDWIFE

## 2018-01-24 PROCEDURE — 82950 GLUCOSE TEST: CPT | Performed by: ADVANCED PRACTICE MIDWIFE

## 2018-01-24 PROCEDURE — 36415 COLL VENOUS BLD VENIPUNCTURE: CPT | Performed by: ADVANCED PRACTICE MIDWIFE

## 2018-01-24 PROCEDURE — G0463 HOSPITAL OUTPT CLINIC VISIT: HCPCS | Mod: 25

## 2018-01-24 ASSESSMENT — ANXIETY QUESTIONNAIRES
3. WORRYING TOO MUCH ABOUT DIFFERENT THINGS: NOT AT ALL
5. BEING SO RESTLESS THAT IT IS HARD TO SIT STILL: NOT AT ALL
7. FEELING AFRAID AS IF SOMETHING AWFUL MIGHT HAPPEN: SEVERAL DAYS
2. NOT BEING ABLE TO STOP OR CONTROL WORRYING: NOT AT ALL
1. FEELING NERVOUS, ANXIOUS, OR ON EDGE: NOT AT ALL
GAD7 TOTAL SCORE: 2
6. BECOMING EASILY ANNOYED OR IRRITABLE: SEVERAL DAYS

## 2018-01-24 ASSESSMENT — PATIENT HEALTH QUESTIONNAIRE - PHQ9
SUM OF ALL RESPONSES TO PHQ QUESTIONS 1-9: 0
5. POOR APPETITE OR OVEREATING: NOT AT ALL

## 2018-01-24 NOTE — LETTER
2018       RE: Susan Locke  [address]  [place] [address]     Dear Colleague,    Thank you for referring your patient, Susan Locke, to the [facility] at [facility]. Please see a copy of my visit note below.    SUBJECTIVE   32 year old, , 27w0d, presents today with her supportive partner for an EOB visit. Pt arrived 10 minutes late, so limited time to review EOB education.     Denies contractions, leaking of fluid and vaginal bleeding. Feeling normal daily fetal movement. Denies HA, scotoma, RUQ pain, nausea and vomiting.     The patient presents with the following concerns: Questions about waterbirth and tdap answered. Discussed doulas.     PHQ-9 SCORE 2017   Total Score 2 0     Education completed today includes breast feeding, [facility] hand out , contraception, counting movements, signs of pre-term labor, when to present to birthplace, post partum depression, GBS, getting enough iron and labor induction.  Birth preferences reviewed: Water birth  Labor support:      Feeding plans :    Contraception planned:  unsure  The following labs were ordered today:       GCT, CBC w platelets, Vitamin D, Hep C and Anti-treponema  Water birth consent form was given.  Blood type:   ABO   Date Value Ref Range Status   2017 O  Final     RH(D)   Date Value Ref Range Status   2017 Pos  Final     Antibody Screen   Date Value Ref Range Status   2017 Neg  Final   Rhogam  was not given.  TDAP was given.    A/P:  Encounter Diagnosis   Name Primary?     Encounter for supervision of normal first pregnancy in third trimester Yes     Orders Placed This Encounter   Procedures     TDAP VACCINE (BOOSTRIX)     Glucose tolerance gest screen 1 hour [[id number]]     25- OH-Vitamin D     CBC with platelets     Anti Treponema     Hepatitis C antibody     -PT inquiring about having labs drawn as part of a  program at work where she will receive money as an incentive for having the tests done. She will look into whether her insurance covers the tests and call to have us order them, or request them at her next visit. The labs include: Total cholesterol, HDL, LDL, Triglycerides, Cholesterol/HDL Ratio, Glucose. Pt is aware that she would need to fast for these tests.     RTC 4 weeks, sooner as needed.       Again, thank you for allowing me to participate in the care of your patient.      Sincerely,    LEANDRA Henning CNM

## 2018-01-24 NOTE — PROGRESS NOTES
SUBJECTIVE   32 year old, , 27w0d, presents today with her supportive partner for an EOB visit. Pt arrived 10 minutes late, so limited time to review EOB education.     Denies contractions, leaking of fluid and vaginal bleeding. Feeling normal daily fetal movement. Denies HA, scotoma, RUQ pain, nausea and vomiting.     The patient presents with the following concerns: Questions about waterbirth and tdap answered. Discussed doulas.     PHQ-9 SCORE 2017   Total Score 2 0     Education completed today includes breast feeding, [facility] hand out , contraception, counting movements, signs of pre-term labor, when to present to birthplace, post partum depression, GBS, getting enough iron and labor induction.  Birth preferences reviewed: Water birth  Labor support:      Feeding plans :    Contraception planned:  unsure  The following labs were ordered today:       GCT, CBC w platelets, Vitamin D, Hep C and Anti-treponema  Water birth consent form was given.  Blood type:   ABO   Date Value Ref Range Status   2017 O  Final     RH(D)   Date Value Ref Range Status   2017 Pos  Final     Antibody Screen   Date Value Ref Range Status   2017 Neg  Final   Rhogam  was not given.  TDAP was given.    A/P:  Encounter Diagnosis   Name Primary?     Encounter for supervision of normal first pregnancy in third trimester Yes     Orders Placed This Encounter   Procedures     TDAP VACCINE (BOOSTRIX)     Glucose tolerance gest screen 1 hour [[id number]]     25- OH-Vitamin D     CBC with platelets     Anti Treponema     Hepatitis C antibody     -PT inquiring about having labs drawn as part of a program at work where she will receive money as an incentive for having the tests done. She will look into whether her insurance covers the tests and call to have us order them, or request them at her next visit. The labs include: Total cholesterol, HDL, LDL, Triglycerides, Cholesterol/HDL Ratio,  Glucose. Pt is aware that she would need to fast for these tests.     RTC 4 weeks, sooner as needed.     LEANDRA HenningM

## 2018-01-24 NOTE — MR AVS SNAPSHOT
After Visit Summary   1/24/2018    Susan Locke    MRN: [id number]           Patient Information     Date Of Birth          1985        Visit Information        Provider Department      1/24/2018 8:15 AM Misty Ferris APRN CNM [facility]        Today's Diagnoses     Encounter for supervision of normal first pregnancy in third trimester    -  1       Follow-ups after your visit        Follow-up notes from your care team     Return in about 4 weeks (around 2/21/2018) for Return OB.      Your next 10 appointments already scheduled     Feb 22, 2018  8:15 AM CST   RETURN OB with LEANDRA Judd CNM   [facility] ([facility])    [facility] [address]  [address]  [facility] [address] [phone]              Who to contact     Please call your clinic at [phone] to:    Ask questions about your health    Make or cancel appointments    Discuss your medicines    Learn about your test results    Speak to your doctor   If you have compliments or concerns about an experience at your clinic, or if you wish to file a complaint, please contact [facility] Patient Relations at [phone] or email us at [email or web address]         Additional Information About Your Visit        MyChart Information     [employer] gives you secure access to your electronic health record. If you see a primary care provider, you can also send messages to your care team and make appointments. If you have questions, please call your primary care clinic.  If you do not have a primary care provider, please call [phone] and they will assist you.      [employer] is an electronic gateway that provides easy, online access to your medical records. With [employer] you can request a clinic appointment, read your test results, renew a prescription or communicate with your care team.     To access  "your existing account, please contact your [facility] or call [phone] for assistance.        Care EveryWhere ID     This is your Care EveryWhere ID. This could be used by other organizations to access your [place] medical records  [id number]        Your Vitals Were     Pulse Height Last Period BMI (Body Mass Index)          91 1.702 m (5' 7\") 07/19/2017 (Exact Date) 28.08 kg/m2         Blood Pressure from Last 3 Encounters:   01/24/18 115/76   12/20/17 113/74   11/22/17 116/70    Weight from Last 3 Encounters:   01/24/18 81.3 kg (179 lb 4.8 oz)   12/20/17 78.4 kg (172 lb 14.4 oz)   11/22/17 75.3 kg (166 lb)              We Performed the Following     25- OH-Vitamin D     Anti Treponema     CBC with platelets     Glucose tolerance gest screen 1 hour [[id number]]     Hepatitis C antibody     TDAP VACCINE (BOOSTRIX)        Primary Care Provider    None Specified       No primary provider on file.        Equal Access to Services     JORGE SHEN : Hadpavan harrison Soken, waaxda luqadaha, qaybta kaalmanadiya murray, amy field . So [facility] [phone].    ATENCIÓN: Si habla español, tiene a carrera disposición servicios gratuitos de asistencia lingüística. Llame al [phone].    We comply with applicable federal civil rights laws and [place] laws. We do not discriminate on the basis of race, color, national origin, age, disability, sex, sexual orientation, or gender identity.            Thank you!     Thank you for choosing [facility]  for your care. Our goal is always to provide you with excellent care. Hearing back from our patients is one way we can continue to improve our services. Please take a few minutes to complete the written survey that you may receive in the mail after your visit with us. Thank you!             Your Updated Medication List - Protect others around you: Learn how to safely use, store and throw away your " medicines at www.disposemymeds.org.          This list is accurate as of 1/24/18  9:56 AM.  Always use your most recent med list.                   Brand Name Dispense Instructions for use Diagnosis    PRENATAL VITAMIN PO           pyridOXINE 25 MG tablet    vitamin B-6     Take 25 mg by mouth daily        VITAMIN D (CHOLECALCIFEROL) PO      Take 5,000 Units by mouth daily

## 2018-01-25 ASSESSMENT — ANXIETY QUESTIONNAIRES: GAD7 TOTAL SCORE: 2

## 2018-02-20 ENCOUNTER — MYC MEDICAL ADVICE (OUTPATIENT)
Dept: OBGYN | Facility: CLINIC | Age: 33
End: 2018-02-20

## 2018-02-20 DIAGNOSIS — Z00.00 PREVENTATIVE HEALTH CARE: Primary | ICD-10-CM

## 2018-02-20 NOTE — TELEPHONE ENCOUNTER
Patient needing cholesterol tests and fasting glucose done for insurance purposes. Nurse input orders.

## 2018-02-22 ENCOUNTER — OFFICE VISIT (OUTPATIENT)
Dept: OBGYN | Facility: CLINIC | Age: 33
End: 2018-02-22
Attending: ADVANCED PRACTICE MIDWIFE
Payer: COMMERCIAL

## 2018-02-22 VITALS
HEIGHT: 67 IN | BODY MASS INDEX: 28.83 KG/M2 | SYSTOLIC BLOOD PRESSURE: 104 MMHG | WEIGHT: 183.7 LBS | DIASTOLIC BLOOD PRESSURE: 52 MMHG

## 2018-02-22 DIAGNOSIS — Z34.03 ENCOUNTER FOR SUPERVISION OF NORMAL FIRST PREGNANCY IN THIRD TRIMESTER: ICD-10-CM

## 2018-02-22 DIAGNOSIS — Z00.00 PREVENTATIVE HEALTH CARE: ICD-10-CM

## 2018-02-22 DIAGNOSIS — Z34.03 NORMAL FIRST PREGNANCY IN THIRD TRIMESTER: Primary | ICD-10-CM

## 2018-02-22 LAB
CHOLEST SERPL-MCNC: 201 MG/DL
GLUCOSE SERPL-MCNC: 80 MG/DL (ref 70–99)
HDLC SERPL-MCNC: 95 MG/DL
LDLC SERPL CALC-MCNC: 87 MG/DL
NONHDLC SERPL-MCNC: 106 MG/DL
TRIGL SERPL-MCNC: 97 MG/DL

## 2018-02-22 PROCEDURE — 36415 COLL VENOUS BLD VENIPUNCTURE: CPT | Performed by: ADVANCED PRACTICE MIDWIFE

## 2018-02-22 PROCEDURE — 80061 LIPID PANEL: CPT | Performed by: ADVANCED PRACTICE MIDWIFE

## 2018-02-22 PROCEDURE — G0463 HOSPITAL OUTPT CLINIC VISIT: HCPCS | Mod: ZF

## 2018-02-22 PROCEDURE — 82947 ASSAY GLUCOSE BLOOD QUANT: CPT | Performed by: ADVANCED PRACTICE MIDWIFE

## 2018-02-22 ASSESSMENT — PAIN SCALES - GENERAL: PAINLEVEL: NO PAIN (0)

## 2018-02-22 NOTE — MR AVS SNAPSHOT
After Visit Summary   2/22/2018    Susan Locke    MRN: [id number]           Patient Information     Date Of Birth          1985        Visit Information        Provider Department      2/22/2018 8:15 AM Brit Garcia APRN CNM [facility]        Today's Diagnoses     Normal first pregnancy in third trimester    -  1    Encounter for supervision of normal first pregnancy in third trimester           Follow-ups after your visit        Follow-up notes from your care team     Return in about 2 weeks (around 3/8/2018) for ROMELIA.      Your next 10 appointments already scheduled     Mar 08, 2018  8:15 AM CST   RETURN OB with LEANDRA Judd CNM   [facility] ([facility])    [facility] [address]  [address]  [facility] [place] [address] [phone]              Who to contact     Please call your clinic at [phone] to:    Ask questions about your health    Make or cancel appointments    Discuss your medicines    Learn about your test results    Speak to your doctor            Additional Information About Your Visit        [employer] Information     [employer] gives you secure access to your electronic health record. If you see a primary care provider, you can also send messages to your care team and make appointments. If you have questions, please call your primary care clinic.  If you do not have a primary care provider, please call [phone] and they will assist you.      [employer] is an electronic gateway that provides easy, online access to your medical records. With [employer], you can request a clinic appointment, read your test results, renew a prescription or communicate with your care team.     To access your existing account, please contact your [facility] or call [phone] for assistance.        Care EveryWhere ID     This is your Care EveryWhere ID. This could be used  "by other organizations to access your [place] medical records  [id number]        Your Vitals Were     Height Last Period BMI (Body Mass Index)             1.702 m (5' 7.01\") 07/19/2017 (Exact Date) 28.76 kg/m2          Blood Pressure from Last 3 Encounters:   02/22/18 104/52   01/24/18 115/76   12/20/17 113/74    Weight from Last 3 Encounters:   02/22/18 83.3 kg (183 lb 11.2 oz)   01/24/18 81.3 kg (179 lb 4.8 oz)   12/20/17 78.4 kg (172 lb 14.4 oz)              Today, you had the following     No orders found for display       Primary Care Provider    None Specified       No primary provider on file.        Equal Access to Services     JORGE SHEN : Cristiane Peres, waadalberto gutierrez, ashely kaalradha murray, amy field . So [facility] [phone].    ATENCIÓN: Si habla español, tiene a carrera disposición servicios gratuitos de asistencia lingüística. Llame al [phone].    We comply with applicable federal civil rights laws and [place] laws. We do not discriminate on the basis of race, color, national origin, age, disability, sex, sexual orientation, or gender identity.            Thank you!     Thank you for choosing [facility]  for your care. Our goal is always to provide you with excellent care. Hearing back from our patients is one way we can continue to improve our services. Please take a few minutes to complete the written survey that you may receive in the mail after your visit with us. Thank you!             Your Updated Medication List - Protect others around you: Learn how to safely use, store and throw away your medicines at www.disposemymeds.org.          This list is accurate as of 2/22/18  9:43 AM.  Always use your most recent med list.                   Brand Name Dispense Instructions for use Diagnosis    PRENATAL VITAMIN PO           pyridOXINE 25 MG tablet    vitamin B-6     Take 25 mg by mouth daily        VITAMIN D " (CHOLECALCIFEROL) PO      Take 5,000 Units by mouth daily

## 2018-02-22 NOTE — LETTER
"2018       RE: Susan Locke  [address]  [facility] [address]     Dear Colleague,    Thank you for referring your patient, Susan Locke, to the [facility] at [facility]. Please see a copy of my visit note below.    Subjective:      32 year old  at 31w1d presentst for a routine prenatal appointment. Fell this past weekend and spoke with on call CNM several hours later, was not seen, and has no continued concerns related to the fall. Will call to be evaluated if she falls again.   Denies vaginal bleeding or leakage of fluid.  No contractions. Positive fetal movement.       No HA, visual changes, RUQ or epigastric pain.   Patient concerns: Mild bilateral ligament pain.   Feeling well overall.  Reviewed EOB labs with patient.  Reviewed TDAP Previously given  Objective:  Vitals:    18 0901   BP: 104/52   Weight: 83.3 kg (183 lb 11.2 oz)   Height: 1.702 m (5' 7.01\")   , see ob flowsheet  Assessment/Plan     Encounter Diagnoses   Name Primary?     Normal first pregnancy in third trimester Yes     Encounter for supervision of normal first pregnancy in third trimester          ABO   Date Value Ref Range Status   2017 O  Final     RH(D)   Date Value Ref Range Status   2017 Pos  Final     Antibody Screen   Date Value Ref Range Status   2017 Neg  Final   , Rhogam  was notgiven.    - Reviewed total weight gain, encouraged continued healthy diet and exercise.  .  Reviewed importance of daily fetal kick count and why/how to contact provider.    - Reviewed why/how to contact provider if headache/visual changes/RUQ or epigastric pain, decreased fetal movement, vaginal bleeding, leakage of fluid or more than 4 contractions in an hour.     Patient education/orders or handouts today:  PTL signs/symptoms and PIH signs/symptoms.  Reviewed GBS screening at 35-36 wks.  Reviewed safety on ice and snow and encouraged patient to " call with any falls.    Return to clinic in 2 weeks and prn if questions or concerns.     I, Mansi Queen, am serving as a scribe to document services personally performed by CNM based on the provider's statements to me. ALDEN Mathew  The encounter was performed by me and scribed by the SNM. The scribed note accurately reflects my personal services and decisions made by me.         Again, thank you for allowing me to participate in the care of your patient.      Sincerely,    LEANDRA Judd CNM

## 2018-02-22 NOTE — PROGRESS NOTES
"Subjective:      32 year old  at 31w1d presentst for a routine prenatal appointment. Fell this past weekend and spoke with on call CNM several hours later, was not seen, and has no continued concerns related to the fall. Will call to be evaluated if she falls again.   Denies vaginal bleeding or leakage of fluid.  No contractions. Positive fetal movement.       No HA, visual changes, RUQ or epigastric pain.   Patient concerns: Mild bilateral ligament pain.   Feeling well overall.  Reviewed EOB labs with patient.  Reviewed TDAP Previously given  Objective:  Vitals:    18 0901   BP: 104/52   Weight: 83.3 kg (183 lb 11.2 oz)   Height: 1.702 m (5' 7.01\")   , see ob flowsheet  Assessment/Plan     Encounter Diagnoses   Name Primary?     Normal first pregnancy in third trimester Yes     Encounter for supervision of normal first pregnancy in third trimester          ABO   Date Value Ref Range Status   2017 O  Final     RH(D)   Date Value Ref Range Status   2017 Pos  Final     Antibody Screen   Date Value Ref Range Status   2017 Neg  Final   , Rhogam  was notgiven.    - Reviewed total weight gain, encouraged continued healthy diet and exercise.  .  Reviewed importance of daily fetal kick count and why/how to contact provider.    - Reviewed why/how to contact provider if headache/visual changes/RUQ or epigastric pain, decreased fetal movement, vaginal bleeding, leakage of fluid or more than 4 contractions in an hour.     Patient education/orders or handouts today:  PTL signs/symptoms and PIH signs/symptoms.  Reviewed GBS screening at 35-36 wks.  Reviewed safety on ice and snow and encouraged patient to call with any falls.    Return to clinic in 2 weeks and prn if questions or concerns.     IMansi, am serving as a scribe to document services personally performed by CNM based on the provider's statements to me. ALDEN Mathew  The encounter was performed by me and scribed by the SNM. " The scribed note accurately reflects my personal services and decisions made by me. LEANDRA Judd CNM

## 2018-02-22 NOTE — NURSING NOTE
Chief Complaint   Patient presents with     Prenatal Care   31.1 weeks  Ob visit feeling well.   Fell on Sunday  Fell on side right side some bone pain.

## 2018-03-02 ENCOUNTER — HOSPITAL ENCOUNTER (OUTPATIENT)
Facility: CLINIC | Age: 33
Discharge: HOME | End: 2018-03-02
Attending: ADVANCED PRACTICE MIDWIFE | Admitting: ADVANCED PRACTICE MIDWIFE
Payer: COMMERCIAL

## 2018-03-02 VITALS
DIASTOLIC BLOOD PRESSURE: 75 MMHG | TEMPERATURE: 98 F | HEART RATE: 62 BPM | SYSTOLIC BLOOD PRESSURE: 117 MMHG | RESPIRATION RATE: 20 BRPM

## 2018-03-02 PROBLEM — Z36.89 ENCOUNTER FOR TRIAGE IN PREGNANT PATIENT: Status: ACTIVE | Noted: 2018-03-02

## 2018-03-02 PROCEDURE — 59025 FETAL NON-STRESS TEST: CPT

## 2018-03-02 PROCEDURE — G0463 HOSPITAL OUTPT CLINIC VISIT: HCPCS | Mod: 25

## 2018-03-02 RX ORDER — PSYLLIUM HUSK 100 %
POWDER (GRAM) MISCELLANEOUS
COMMUNITY
End: 2019-08-23

## 2018-03-02 NOTE — IP AVS SNAPSHOT
MRN:[id number]                      After Visit Summary   3/2/2018    Susan Locke    MRN: [id number]           Thank you!     Thank you for choosing [facility] for your care. Our goal is always to provide you with excellent care. Hearing back from our patients is one way we can continue to improve our services. Please take a few minutes to complete the written survey that you may receive in the mail after you visit with us. Thank you!        Patient Information     Date Of Birth          1985        Designated Caregiver       Most Recent Value    Caregiver    Will someone help with your care after discharge? no      About your hospital stay     You were admitted on:  March 2, 2018 You last received care in the:  [facility]    You were discharged on:  March 2, 2018       Who to Call     For medical emergencies, please call 911.  For non-urgent questions about your medical care, please call your primary care provider or clinic, None          Attending Provider     Provider Lu Griffith APRN CNM OB/Gyn       Primary Care Provider Fax #    Physician No Ref-Primary [phone]      Your next 10 appointments already scheduled     Mar 08, 2018  8:15 AM CST   RETURN OB with LEANDRA Judd CNM   [facility] ([facility])    [facility] [address]  [address]  [address]  [facility] [address] [phone]              Further instructions from your care team       Discharge Instruction for Undelivered Patients      You were seen for: Fetal Assessment  We Consulted: Lu Blake  You had (Test or Medicine):fetal monitoring     Diet:   Drink 8 to 12 glasses of liquids (milk, juice, water) every day.  You may eat meals and snacks.     Activity:  Call your doctor or nurse midwife if your baby is moving less than usual.     Call your provider if you notice:  Swelling in your face or increased swelling in your hands  or legs.  Headaches that are not relieved by Tylenol (acetaminophen).  Changes in your vision (blurring: seeing spots or stars.)  Nausea (sick to your stomach) and vomiting (throwing up).   Weight gain of 5 pounds or more per week.  Heartburn that doesn't go away.  Signs of bladder infection: pain when you urinate (use the toilet), need to go more often and more urgently.  The bag of sarabia (rupture of membranes) breaks, or you notice leaking in your underwear.  Bright red blood in your underwear.  Abdominal (lower belly) or stomach pain.  For first baby: Contractions (tightening) less than 5 minutes apart for one hour or more.  Second (plus) baby: Contractions (tightening) less than 10 minutes apart and getting stronger.  *If less than 34 weeks: Contractions (tightenings) more than 6 times in one hour.  Increase or change in vaginal discharge (note the color and amount)  Other:     Follow-up:  As scheduled in the clinic          Pending Results     No orders found from 2/28/2018 to 3/3/2018.            Statement of Approval     Ordered          03/02/18 1904  I have reviewed and agree with all the recommendations and orders detailed in this document.  EFFECTIVE NOW     Approved and electronically signed by:  Lu Blake APRN CNM             Admission Information     Date & Time Provider Department Dept. Phone    3/2/2018 Lu Blake APRN CNM [facility] [phone]      Your Vitals Were     Blood Pressure Pulse Temperature Respirations Last Period       117/75 62 98  F (36.7  C) (Oral) 20 07/19/2017 (Exact Date)       MyChart Information     [employer] gives you secure access to your electronic health record. If you see a primary care provider, you can also send messages to your care team and make appointments. If you have questions, please call your primary care clinic.  If you do not have a primary care provider, please call [phone] and they will assist you.        Care EveryWhere  ID     This is your Care EveryWhere ID. This could be used by other organizations to access your [place] medical records  [id number]        Equal Access to Services     JORGE SHEN : Hadii delio Peres, sowmya gutierrez, barronandres leoalienadiya alexandergregorio, waxdeisy juan ramonin hayaaartur alexanderlinn osman laAnyamiracle magdaleno. So [facility] [phone].    ATENCIÓN: Si habla español, tiene a carrera disposición servicios gratuitos de asistencia lingüística. Llame al [phone].    We comply with applicable federal civil rights laws and [place] laws. We do not discriminate on the basis of race, color, national origin, age, disability, sex, sexual orientation, or gender identity.               Review of your medicines      UNREVIEWED medicines. Ask your doctor about these medicines        Dose / Directions    PRENATAL VITAMIN PO        Refills:  0       Psyllium Husk Powd        Refills:  0       pyridOXINE 25 MG tablet   Commonly known as:  vitamin B-6        Dose:  25 mg   Take 25 mg by mouth daily   Refills:  0       VITAMIN D (CHOLECALCIFEROL) PO        Dose:  5000 Units   Take 5,000 Units by mouth daily   Refills:  0                Protect others around you: Learn how to safely use, store and throw away your medicines at www.disposemymeds.org.             Medication List: This is a list of all your medications and when to take them. Check marks below indicate your daily home schedule. Keep this list as a reference.      Medications           Morning Afternoon Evening Bedtime As Needed    PRENATAL VITAMIN PO                                Psyllium Husk Powd                                pyridOXINE 25 MG tablet   Commonly known as:  vitamin B-6   Take 25 mg by mouth daily                                VITAMIN D (CHOLECALCIFEROL) PO   Take 5,000 Units by mouth daily

## 2018-03-02 NOTE — IP AVS SNAPSHOT
[facility]    [address] [place] [address]    Phone:  [phone]                                       After Visit Summary   3/2/2018    Susan Locke    MRN: [id number]           After Visit Summary Signature Page     I have received my discharge instructions, and my questions have been answered. I have discussed any challenges I see with this plan with the nurse or doctor.    ..........................................................................................................................................  Patient/Patient Representative Signature      ..........................................................................................................................................  Patient Representative Print Name and Relationship to Patient    ..................................................               ................................................  Date                                            Time    ..........................................................................................................................................  Reviewed by Signature/Title    ...................................................              ..............................................  Date                                                            Time

## 2018-03-03 NOTE — PLAN OF CARE
Patient arrived to unit with chief complaint of fall without abdominal trauma.  Verbal consent obtained for EFM and tocometry, and applied.  FHR baseline 125.  Patient denies abdominal trauma, abdominal pain, contractions, loss of fluid, or vaginal bleeding.  Patient reports that she feel on her side and back.  Provider notified of patient arrival.  Will update provider with any changes to fetal or maternal status.

## 2018-03-03 NOTE — DISCHARGE INSTRUCTIONS
Discharge Instruction for Undelivered Patients      You were seen for: Fetal Assessment  We Consulted: Lu Blake  You had (Test or Medicine):fetal monitoring     Diet:   Drink 8 to 12 glasses of liquids (milk, juice, water) every day.  You may eat meals and snacks.     Activity:  Call your doctor or nurse midwife if your baby is moving less than usual.     Call your provider if you notice:  Swelling in your face or increased swelling in your hands or legs.  Headaches that are not relieved by Tylenol (acetaminophen).  Changes in your vision (blurring: seeing spots or stars.)  Nausea (sick to your stomach) and vomiting (throwing up).   Weight gain of 5 pounds or more per week.  Heartburn that doesn't go away.  Signs of bladder infection: pain when you urinate (use the toilet), need to go more often and more urgently.  The bag of sarabia (rupture of membranes) breaks, or you notice leaking in your underwear.  Bright red blood in your underwear.  Abdominal (lower belly) or stomach pain.  For first baby: Contractions (tightening) less than 5 minutes apart for one hour or more.  Second (plus) baby: Contractions (tightening) less than 10 minutes apart and getting stronger.  *If less than 34 weeks: Contractions (tightenings) more than 6 times in one hour.  Increase or change in vaginal discharge (note the color and amount)  Other:     Follow-up:  As scheduled in the clinic

## 2018-03-03 NOTE — PLAN OF CARE
Data: [facility] at 1800.   Reason for maternal/fetal assessment per patient is Fall  . Patient is a . Prenatal record reviewed.      Obstetric History       T0      L0     SAB0   TAB0   Ectopic0   Multiple0   Live Births0       # Outcome Date GA Lbr Goyo/2nd Weight Sex Delivery Anes PTL Lv   1 Current                  Medical History:   Past Medical History:   Diagnosis Date     Arthritis      Dermatitis 2017     H. pylori infection      Hay fever      Rheumatism    . Gestational Age 32w2d. VSS. Cervix: not examined.  Fetal movement present. Patient denies cramping, backache, vaginal discharge, pelvic pressure, UTI symptoms, GI problems, bloody show, vaginal bleeding, edema, headache, visual disturbances, epigastric or URQ pain, abdominal pain, rupture of membranes. Support persons  present.  Action: Verbal consent for EFM. Triage assessment completed. EFM applied for fetal assessment. Uterine assessment via toco. Fetal assessment: Presumed adequate fetal oxygenation documented (see flow record). Patient education pamphlets given on fetal movement counts and reasons for follow up. Patient instructed to report change in fetal movement, vaginal leaking of fluid or bleeding, abdominal pain, or any concerns related to the pregnancy to her nurse/physician.   Response: Lu Blake informed of arrival. Plan per provider is discharge home. Patient verbalized understanding of education and verbalized agreement with plan. Discharged ambulatory at 1910.

## 2018-03-08 ENCOUNTER — OFFICE VISIT (OUTPATIENT)
Dept: OBGYN | Facility: CLINIC | Age: 33
End: 2018-03-08
Attending: ADVANCED PRACTICE MIDWIFE
Payer: COMMERCIAL

## 2018-03-08 VITALS
WEIGHT: 189.4 LBS | SYSTOLIC BLOOD PRESSURE: 105 MMHG | DIASTOLIC BLOOD PRESSURE: 71 MMHG | HEART RATE: 80 BPM | HEIGHT: 67 IN | BODY MASS INDEX: 29.73 KG/M2

## 2018-03-08 DIAGNOSIS — Z34.03 NORMAL FIRST PREGNANCY IN THIRD TRIMESTER: Primary | ICD-10-CM

## 2018-03-08 PROBLEM — Z36.89 ENCOUNTER FOR TRIAGE IN PREGNANT PATIENT: Status: RESOLVED | Noted: 2018-03-02 | Resolved: 2018-03-08

## 2018-03-08 PROCEDURE — G0463 HOSPITAL OUTPT CLINIC VISIT: HCPCS | Mod: ZF

## 2018-03-08 NOTE — PROGRESS NOTES
"Subjective:      32 year old  at 33w1d presentst for a routine prenatal appointment.   vaginal bleeding or leakage of fluid. Denies contractions.  Positive fetal movement.       No HA, visual changes, RUQ or epigastric pain.   Patient concerns: blurred vision. Feeling well overall.  Reviewed EOB labs with patient.  Reviewed TDAP Previously given  Objective:  Vitals:    18 0837   BP: 105/71   BP Location: Left arm   Patient Position: Chair   Pulse: 80   Weight: 85.9 kg (189 lb 6.4 oz)   Height: 1.702 m (5' 7\")   , see ob flowsheet  Assessment/Plan     Encounter Diagnosis   Name Primary?     Normal first pregnancy in third trimester Yes         ABO   Date Value Ref Range Status   2017 O  Final     RH(D)   Date Value Ref Range Status   2017 Pos  Final     Antibody Screen   Date Value Ref Range Status   2017 Neg  Final   , Rhogam  wasgiven.    - Reviewed total weight gain, encouraged continued healthy diet and exercise.  .  Reviewed importance of daily fetal kick count and why/how to contact provider.  - Reviewed physiological eye changes in pregnancy. Encouraged to arian if she notices flashing, spotting or other vision changes.     - Reviewed why/how to contact provider if headache/visual changes/RUQ or epigastric pain, decreased fetal movement, vaginal bleeding, leakage of fluid or more than 4 contractions in an hour.     Patient education/orders or handouts today:  PTL signs/symptoms  Reviewed CBC, GBS screening at 35-36 wks.  Return to clinic in 3 weeks and prn if questions or concerns.     I, Pao Villanueva, am serving as a scribe; to document services personally performed by  Brit Garcia based on data collection and the provider's statements to me.     Pao Villanueva  The encounter was performed by me and scribed by the SN. The scribed note accurately reflects my personal services and decisions made by me. Brit Garcia, LEANDRA MELVIN      "

## 2018-03-08 NOTE — MR AVS SNAPSHOT
After Visit Summary   3/8/2018    Susan Locke    MRN: [id number]           Patient Information     Date Of Birth          1985        Visit Information        Provider Department      3/8/2018 8:15 AM Brit Garcia APRN CN [facility]        Today's Diagnoses     Normal first pregnancy in third trimester    -  1       Follow-ups after your visit        Follow-up notes from your care team     Return in about 3 weeks (around 3/29/2018) for ROMELIA.      Your next 10 appointments already scheduled     Mar 29, 2018  8:15 AM CDT   RETURN OB with Vivienne Ahn CNM   [facility] ([facility])    [facility] [address]  [address]  [facility] [address] [phone]              Who to contact     Please call your clinic at [phone] to:    Ask questions about your health    Make or cancel appointments    Discuss your medicines    Learn about your test results    Speak to your doctor            Additional Information About Your Visit        MyChart Information     [employer] gives you secure access to your electronic health record. If you see a primary care provider, you can also send messages to your care team and make appointments. If you have questions, please call your primary care clinic.  If you do not have a primary care provider, please call [phone] and they will assist you.      [employer] is an electronic gateway that provides easy, online access to your medical records. With [employer], you can request a clinic appointment, read your test results, renew a prescription or communicate with your care team.     To access your existing account, please contact your [facility] or call [phone] for assistance.        Care EveryWhere ID     This is your Care EveryWhere ID. This could be used by other organizations to access your [facility]  "records  [id number]        Your Vitals Were     Pulse Height Last Period BMI (Body Mass Index)          80 1.702 m (5' 7\") 07/19/2017 (Exact Date) 29.66 kg/m2         Blood Pressure from Last 3 Encounters:   03/08/18 105/71   03/02/18 117/75   02/22/18 104/52    Weight from Last 3 Encounters:   03/08/18 85.9 kg (189 lb 6.4 oz)   02/22/18 83.3 kg (183 lb 11.2 oz)   01/24/18 81.3 kg (179 lb 4.8 oz)              Today, you had the following     No orders found for display       Primary Care Provider Fax #    Physician No Ref-Primary [phone]       No address on file        Equal Access to Services     JORGE SHEN : Cristiane Peres, waaxnadiya luqadaha, qaybta kaalmada trevor, amy field . So [facility] [phone].    ATENCIÓN: Si habla español, tiene a carrera disposición servicios gratuitos de asistencia lingüística. Llame al [phone].    We comply with applicable federal civil rights laws and [place] laws. We do not discriminate on the basis of race, color, national origin, age, disability, sex, sexual orientation, or gender identity.            Thank you!     Thank you for choosing [facility]  for your care. Our goal is always to provide you with excellent care. Hearing back from our patients is one way we can continue to improve our services. Please take a few minutes to complete the written survey that you may receive in the mail after your visit with us. Thank you!             Your Updated Medication List - Protect others around you: Learn how to safely use, store and throw away your medicines at www.disposemymeds.org.          This list is accurate as of 3/8/18  9:42 AM.  Always use your most recent med list.                   Brand Name Dispense Instructions for use Diagnosis    PRENATAL VITAMIN PO           Psyllium Husk Powd           pyridOXINE 25 MG tablet    vitamin B-6     Take 25 mg by mouth daily        VITAMIN D (CHOLECALCIFEROL) PO "      Take 5,000 Units by mouth daily

## 2018-03-08 NOTE — LETTER
"3/8/2018       RE: Susan Locke  [address]  [facility] [place] [facility] [address]     Dear Colleague,    Thank you for referring your patient, Susan Locke, to the [facility] at [facility]. Please see a copy of my visit note below.    Subjective:      32 year old  at 33w1d presentst for a routine prenatal appointment.   vaginal bleeding or leakage of fluid. Denies contractions.  Positive fetal movement.       No HA, visual changes, RUQ or epigastric pain.   Patient concerns: blurred vision. Feeling well overall.  Reviewed EOB labs with patient.  Reviewed TDAP Previously given  Objective:  Vitals:    18 0837   BP: 105/71   BP Location: Left arm   Patient Position: Chair   Pulse: 80   Weight: 85.9 kg (189 lb 6.4 oz)   Height: 1.702 m (5' 7\")   , see ob flowsheet  Assessment/Plan     Encounter Diagnosis   Name Primary?     Normal first pregnancy in third trimester Yes         ABO   Date Value Ref Range Status   2017 O  Final     RH(D)   Date Value Ref Range Status   2017 Pos  Final     Antibody Screen   Date Value Ref Range Status   2017 Neg  Final   , Rhogam  wasgiven.    - Reviewed total weight gain, encouraged continued healthy diet and exercise.  .  Reviewed importance of daily fetal kick count and why/how to contact provider.  - Reviewed physiological eye changes in pregnancy. Encouraged to arian if she notices flashing, spotting or other vision changes.     - Reviewed why/how to contact provider if headache/visual changes/RUQ or epigastric pain, decreased fetal movement, vaginal bleeding, leakage of fluid or more than 4 contractions in an hour.     Patient education/orders or handouts today:  PTL signs/symptoms  Reviewed CBC, GBS screening at 35-36 wks.  Return to clinic in 3 weeks and prn if questions or concerns.     I, Pao Villanueva, am serving as a scribe; to document services personally performed by  Brit Garcia based on " data collection and the provider's statements to me.     Pao Villanueva  The encounter was performed by me and scribed by the SNM. The scribed note accurately reflects my personal services and decisions made by me.     Again, thank you for allowing me to participate in the care of your patient.      Sincerely,    LEANDRA Judd CNM

## 2018-03-29 ENCOUNTER — OFFICE VISIT (OUTPATIENT)
Dept: OBGYN | Facility: CLINIC | Age: 33
End: 2018-03-29
Attending: ADVANCED PRACTICE MIDWIFE
Payer: COMMERCIAL

## 2018-03-29 VITALS
HEART RATE: 70 BPM | SYSTOLIC BLOOD PRESSURE: 114 MMHG | BODY MASS INDEX: 30.61 KG/M2 | HEIGHT: 67 IN | DIASTOLIC BLOOD PRESSURE: 75 MMHG | WEIGHT: 195 LBS

## 2018-03-29 DIAGNOSIS — Z34.03 ENCOUNTER FOR SUPERVISION OF NORMAL FIRST PREGNANCY IN THIRD TRIMESTER: Primary | ICD-10-CM

## 2018-03-29 LAB
ERYTHROCYTE [DISTWIDTH] IN BLOOD BY AUTOMATED COUNT: 13 % (ref 10–15)
HCT VFR BLD AUTO: 36 % (ref 35–47)
HGB BLD-MCNC: 12.1 G/DL (ref 11.7–15.7)
MCH RBC QN AUTO: 32.5 PG (ref 26.5–33)
MCHC RBC AUTO-ENTMCNC: 33.6 G/DL (ref 31.5–36.5)
MCV RBC AUTO: 97 FL (ref 78–100)
PLATELET # BLD AUTO: 147 10E9/L (ref 150–450)
RBC # BLD AUTO: 3.72 10E12/L (ref 3.8–5.2)
WBC # BLD AUTO: 8.1 10E9/L (ref 4–11)

## 2018-03-29 PROCEDURE — 87653 STREP B DNA AMP PROBE: CPT | Performed by: ADVANCED PRACTICE MIDWIFE

## 2018-03-29 PROCEDURE — 85027 COMPLETE CBC AUTOMATED: CPT | Performed by: ADVANCED PRACTICE MIDWIFE

## 2018-03-29 PROCEDURE — G0463 HOSPITAL OUTPT CLINIC VISIT: HCPCS | Mod: ZF

## 2018-03-29 PROCEDURE — 36415 COLL VENOUS BLD VENIPUNCTURE: CPT | Performed by: ADVANCED PRACTICE MIDWIFE

## 2018-03-29 ASSESSMENT — PAIN SCALES - GENERAL: PAINLEVEL: NO PAIN (0)

## 2018-03-29 NOTE — MR AVS SNAPSHOT
After Visit Summary   3/29/2018    Susan Locke    MRN: [id number]           Patient Information     Date Of Birth          1985        Visit Information        Provider Department      3/29/2018 8:15 AM Vivienne Ahn CNM [facility]        Today's Diagnoses     Encounter for supervision of normal first pregnancy in third trimester    -  1       Follow-ups after your visit        Follow-up notes from your care team     Return in about 1 week (around 4/5/2018) for MURALI MELVIN.      Your next 10 appointments already scheduled     Apr 04, 2018 11:30 AM CDT   RETURN OB with LEANDRA Ramos CNM   [facility] ([facility])    [facility] [address]  [address]  [facility] [address] [phone]            Apr 11, 2018 11:30 AM CDT   RETURN OB with LEANDRA Judd   [facility] ([facility])    [facility] [address]  [address]  [facility] [address] [phone]            Apr 18, 2018 11:30 AM CDT   RETURN OB with Vivienne Ahn CNM   [facility])    [facility] [address]  [address]  [facility] [id number] [phone]            Apr 25, 2018 11:30 AM CDT   RETURN OB with LEANDRA Kincaid   [facility])    [facility] [address]  [address]  [facility] [id number] [phone]              Who to contact     Please call your clinic at [phone] to:    Ask questions about your health    Make or cancel appointments    Discuss your medicines    Learn about your test results    Speak to your doctor            Additional Information About Your Visit        MyChart Information     MyChart gives you secure access to your electronic  "health record. If you see a primary care provider, you can also send messages to your care team and make appointments. If you have questions, please call your primary care clinic.  If you do not have a primary care provider, please call [phone] and they will assist you.      [employer] is an electronic gateway that provides easy, online access to your medical records. With [employer] you can request a clinic appointment, read your test results, renew a prescription or communicate with your care team.     To access your existing account, please contact your [facility] or call [phone] for assistance.        Care EveryWhere ID     This is your Care EveryWhere ID. This could be used by other organizations to access your [facility] medical records  [id number]        Your Vitals Were     Pulse Height Last Period BMI (Body Mass Index)          70 1.702 m (5' 7.01\") 07/19/2017 (Exact Date) 30.53 kg/m2         Blood Pressure from Last 3 Encounters:   03/29/18 114/75   03/08/18 105/71   03/02/18 117/75    Weight from Last 3 Encounters:   03/29/18 88.5 kg (195 lb)   03/08/18 85.9 kg (189 lb 6.4 oz)   02/22/18 83.3 kg (183 lb 11.2 oz)              We Performed the Following     CBC with Platelets     Group B strep PCR        Primary Care Provider Fax #    Physician No Ref-Primary [phone]       No address on file        Equal Access to Services     KILO SHEN : Hadii delio ku hadasho Sostephenali, waaxda luqadaha, qaybta kaalmada adeegyada, amy field . So [facility] [phone].    ATENCIÓN: Si habla español, tiene a carrera disposición servicios gratuitos de asistencia lingüística. Llame al [phone].    We comply with applicable federal civil rights laws and [place] laws. We do not discriminate on the basis of race, color, national origin, age, disability, sex, sexual orientation, or gender identity.            Thank you!     Thank you for choosing [facility]  for your care. Our goal is always to provide you with excellent care. Hearing back from our patients is one way we can continue to improve our services. Please take a few minutes to complete the written survey that you may receive in the mail after your visit with us. Thank you!             Your Updated Medication List - Protect others around you: Learn how to safely use, store and throw away your medicines at www.disposemymeds.org.          This list is accurate as of 3/29/18 11:59 PM.  Always use your most recent med list.                   Brand Name Dispense Instructions for use Diagnosis    PRENATAL VITAMIN PO           Psyllium Husk Powd           pyridOXINE 25 MG tablet    vitamin B-6     Take 25 mg by mouth daily        VITAMIN D (CHOLECALCIFEROL) PO      Take 5,000 Units by mouth daily

## 2018-03-29 NOTE — PROGRESS NOTES
"Subjective:      32 year old  at 36w1d presents for a routine prenatal appointment.    Denies cramping/contractinos, vaginal bleeding, leakage of fluid, or change in vaginal discharge.  Reports + fetal movement.   No HA, visual changes, RUQ or epigastric pain.     Patient concerns:  Feeling well overall.  Accompanied by FOB (Walter).  FOB mentions she has had minimal GERD, yet she states \"it's normal and no big deal.\" Denies taking medication for it.    PHQ9= 0, GAD7= 1    Objective:  Vitals:    18 0832   BP: 114/75   Pulse: 70   Weight: 88.5 kg (195 lb)   Height: 1.702 m (5' 7.01\")      See OB flowsheet    Cephalic by leopolds    Assessment/Plan     Encounter Diagnosis   Name Primary?     Encounter for supervision of normal first pregnancy in third trimester Yes     Orders Placed This Encounter   Procedures     CBC with Platelets       Labor signs discussed. Reinforced daily fetal movement counts.  Reviewed why/how to contact provider if headache/visual changes/RUQ or epigastric pain, decreased fetal movement, vaginal bleeding, leakage of fluid.    Birth preferences reviewed: Un-Medicated, signed water birth consent    GBS collected.  CBC with plts ordered.  Continue scheduled prenatal care, RTC in 1 week and prn if questions or concerns.     I, Jonel Spencer MS3, am serving as a scribe to document services personally performed by CNM based on the provider's statements to me.\" - Jonel Spencer MS3    The encounter was performed by me and scribed by the medical student. The scribed note accurately reflects my personal services and decisions made by me.     LEANDRA Chicas, NGOZI  "

## 2018-03-29 NOTE — LETTER
"3/29/2018       RE: Susan Locke  [address]  [place] [address]     Dear Colleague,    Thank you for referring your patient, Susan Locke, to the [facility] at [facility]. Please see a copy of my visit note below.    Subjective:      32 year old  at 36w1d presents for a routine prenatal appointment.    Denies cramping/contractinos, vaginal bleeding, leakage of fluid, or change in vaginal discharge.  Reports + fetal movement.   No HA, visual changes, RUQ or epigastric pain.     Patient concerns:  Feeling well overall.  Accompanied by FOB (Walter).  FOB mentions she has had minimal GERD, yet she states \"it's normal and no big deal.\" Denies taking medication for it.    PHQ9= 0, GAD7= 1    Objective:  Vitals:    18 0832   BP: 114/75   Pulse: 70   Weight: 88.5 kg (195 lb)   Height: 1.702 m (5' 7.01\")      See OB flowsheet    Cephalic by leopolds    Assessment/Plan     Encounter Diagnosis   Name Primary?     Encounter for supervision of normal first pregnancy in third trimester Yes     Orders Placed This Encounter   Procedures     CBC with Platelets       Labor signs discussed. Reinforced daily fetal movement counts.  Reviewed why/how to contact provider if headache/visual changes/RUQ or epigastric pain, decreased fetal movement, vaginal bleeding, leakage of fluid.    Birth preferences reviewed: Un-Medicated, signed water birth consent    GBS collected.  CBC with plts ordered.  Continue scheduled prenatal care, RTC in 1 week and prn if questions or concerns.     I, Jonel Spencer MS3, am serving as a scribe to document services personally performed by CN based on the provider's statements to me.\" - Jonel Spencer MS3    The encounter was performed by me and scribed by the medical student. The scribed note accurately reflects my personal services and decisions made by me.       Again, thank you for allowing me to participate in the " care of your patient.      Sincerely,    Vivienne Ahn CNM

## 2018-03-30 LAB
GP B STREP DNA SPEC QL NAA+PROBE: NEGATIVE
SPECIMEN SOURCE: NORMAL

## 2018-04-02 PROBLEM — D69.6 THROMBOCYTOPENIA (H): Status: ACTIVE | Noted: 2018-04-02

## 2018-04-04 ENCOUNTER — OFFICE VISIT (OUTPATIENT)
Dept: OBGYN | Facility: CLINIC | Age: 33
End: 2018-04-04
Attending: ADVANCED PRACTICE MIDWIFE
Payer: COMMERCIAL

## 2018-04-04 VITALS
SYSTOLIC BLOOD PRESSURE: 112 MMHG | BODY MASS INDEX: 30.48 KG/M2 | HEART RATE: 88 BPM | WEIGHT: 194.2 LBS | HEIGHT: 67 IN | DIASTOLIC BLOOD PRESSURE: 72 MMHG

## 2018-04-04 DIAGNOSIS — Z34.03 ENCOUNTER FOR SUPERVISION OF NORMAL FIRST PREGNANCY IN THIRD TRIMESTER: Primary | ICD-10-CM

## 2018-04-04 LAB
ERYTHROCYTE [DISTWIDTH] IN BLOOD BY AUTOMATED COUNT: 13.2 % (ref 10–15)
HCT VFR BLD AUTO: 37.5 % (ref 35–47)
HGB BLD-MCNC: 12.8 G/DL (ref 11.7–15.7)
MCH RBC QN AUTO: 32.6 PG (ref 26.5–33)
MCHC RBC AUTO-ENTMCNC: 34.1 G/DL (ref 31.5–36.5)
MCV RBC AUTO: 95 FL (ref 78–100)
PLATELET # BLD AUTO: 151 10E9/L (ref 150–450)
RBC # BLD AUTO: 3.93 10E12/L (ref 3.8–5.2)
WBC # BLD AUTO: 7.7 10E9/L (ref 4–11)

## 2018-04-04 PROCEDURE — 85027 COMPLETE CBC AUTOMATED: CPT | Performed by: ADVANCED PRACTICE MIDWIFE

## 2018-04-04 PROCEDURE — 36415 COLL VENOUS BLD VENIPUNCTURE: CPT | Performed by: ADVANCED PRACTICE MIDWIFE

## 2018-04-04 PROCEDURE — G0463 HOSPITAL OUTPT CLINIC VISIT: HCPCS | Mod: ZF

## 2018-04-04 NOTE — LETTER
"2018       RE: Susan Locke  [address]  [place] [address]     Dear Colleague,    Thank you for referring your patient, Susan Locke, to the [facility] at [facility]. Please see a copy of my visit note below.    Subjective:     32 year old  at 37w0d presents for a routine prenatal appointment.  Denies vaginal bleeding,  leakage of fluid, or change in vaginal discharge.  Denies contractions.  + fetal movement.       No HA, visual changes, RUQ or epigastric pain.   Patient concerns: feeling well overall. Reports L-sided pain, intermittent, occasionally improves with rest or position change.     Objective:  Vitals:    18 1141   BP: 112/72   Pulse: 88   Weight: 88.1 kg (194 lb 3.2 oz)   Height: 1.702 m (5' 7.01\")    See OB flowsheet    Assessment/Plan:  Encounter Diagnosis   Name Primary?     Encounter for supervision of normal first pregnancy in third trimester Yes     PHQ-9 SCORE 2017   Total Score 2 0     GBS screening: negative results reviewed.  Birth preferences reviewed: Un-Medicated  Labor signs discussed. Reinforced daily fetal movement counts.  Reviewed why/how to contact provider if headache/visual changes/RUQ or epigastric pain, decreased fetal movement, vaginal bleeding, leakage of fluid.  CBC today  Return to clinic in 1 week and prn if questions or concerns.     Again, thank you for allowing me to participate in the care of your patient.      Sincerely,    LEANDRA Pugh CNM      "

## 2018-04-04 NOTE — MR AVS SNAPSHOT
After Visit Summary   4/4/2018    Susan Locke    MRN: [id number]           Patient Information     Date Of Birth          1985        Visit Information        Provider Department      4/4/2018 11:30 AM Kevin Ponce APRN [facility]        Today's Diagnoses     Encounter for supervision of normal first pregnancy in third trimester    -  1       Follow-ups after your visit        Follow-up notes from your care team     Return in about 1 week (around 4/11/2018) for ROMELIA Visit.      Your next 10 appointments already scheduled     Apr 11, 2018 11:30 AM CDT   RETURN OB with LEANDRA Judd   [facility] ([facility])    [place] [facility] [address]  [facility] [address] [phone]            Apr 18, 2018 11:30 AM CDT   RETURN OB with Vivienne Ahn [facility] ([facility])    [place] [facility] [address]  [address]  [facility] [address] [phone]            Apr 25, 2018 11:30 AM CDT   RETURN OB with LEANDRA Kincaid [facility] ([facility])    [place] [facility] [address]  [address]  [address]  [facility] [address] [phone]              Who to contact     Please call your clinic at [phone] to:    Ask questions about your health    Make or cancel appointments    Discuss your medicines    Learn about your test results    Speak to your doctor            Additional Information About Your Visit        MyChart Information     [employer] gives you secure access to your electronic health record. If you see a primary care provider, you can also send messages to your care team and make appointments. If you have questions, please call your primary care clinic.  If you do not have a primary care provider, please call [phone] and they  "will assist you.      [employer] is an electronic gateway that provides easy, online access to your medical records. With [employer], you can request a clinic appointment, read your test results, renew a prescription or communicate with your care team.     To access your existing account, please contact your [facility] or call [phone] for assistance.        Care EveryWhere ID     This is your Care EveryWhere ID. This could be used by other organizations to access your [place] medical records  [id number]        Your Vitals Were     Pulse Height Last Period BMI (Body Mass Index)          88 1.702 m (5' 7.01\") 07/19/2017 (Exact Date) 30.41 kg/m2         Blood Pressure from Last 3 Encounters:   04/04/18 112/72   03/29/18 114/75   03/08/18 105/71    Weight from Last 3 Encounters:   04/04/18 88.1 kg (194 lb 3.2 oz)   03/29/18 88.5 kg (195 lb)   03/08/18 85.9 kg (189 lb 6.4 oz)              We Performed the Following     CBC with Platelets        Primary Care Provider Fax #    Physician No Ref-Primary [phone]       No address on file        Equal Access to Services     KILO SHEN : Hadii delio Peres, walibanda ludarci, qaybta kaalmada trevor, amy magdaleno. So [facility] [phone].    ATENCIÓN: Si habla español, tiene a carrera disposición servicios gratuitos de asistencia lingüística. Llame al [phone].    We comply with applicable federal civil rights laws and [place] laws. We do not discriminate on the basis of race, color, national origin, age, disability, sex, sexual orientation, or gender identity.            Thank you!     Thank you for choosing [facility]  for your care. Our goal is always to provide you with excellent care. Hearing back from our patients is one way we can continue to improve our services. Please take a few minutes to complete the written survey that you may receive in the mail after your visit with " us. Thank you!             Your Updated Medication List - Protect others around you: Learn how to safely use, store and throw away your medicines at www.disposemymeds.org.          This list is accurate as of 4/4/18 11:55 AM.  Always use your most recent med list.                   Brand Name Dispense Instructions for use Diagnosis    PRENATAL VITAMIN PO           Psyllium Husk Powd           pyridOXINE 25 MG tablet    vitamin B-6     Take 25 mg by mouth daily        VITAMIN D (CHOLECALCIFEROL) PO      Take 5,000 Units by mouth daily

## 2018-04-04 NOTE — PROGRESS NOTES
"Subjective:     32 year old  at 37w0d presents for a routine prenatal appointment.  Denies vaginal bleeding,  leakage of fluid, or change in vaginal discharge.  Denies contractions.  + fetal movement.       No HA, visual changes, RUQ or epigastric pain.   Patient concerns: feeling well overall. Reports L-sided pain, intermittent, occasionally improves with rest or position change.     Objective:  Vitals:    18 1141   BP: 112/72   Pulse: 88   Weight: 88.1 kg (194 lb 3.2 oz)   Height: 1.702 m (5' 7.01\")    See OB flowsheet    Assessment/Plan:  Encounter Diagnosis   Name Primary?     Encounter for supervision of normal first pregnancy in third trimester Yes     PHQ-9 SCORE 2017   Total Score 2 0     GBS screening: negative results reviewed.  Birth preferences reviewed: Un-Medicated  Labor signs discussed. Reinforced daily fetal movement counts.  Reviewed why/how to contact provider if headache/visual changes/RUQ or epigastric pain, decreased fetal movement, vaginal bleeding, leakage of fluid.  CBC today  Return to clinic in 1 week and prn if questions or concerns.   "

## 2018-04-08 ENCOUNTER — HOSPITAL ENCOUNTER (OUTPATIENT)
Facility: CLINIC | Age: 33
End: 2018-04-08
Attending: MIDWIFE | Admitting: MIDWIFE
Payer: COMMERCIAL

## 2018-04-11 ENCOUNTER — OFFICE VISIT (OUTPATIENT)
Dept: OBGYN | Facility: CLINIC | Age: 33
End: 2018-04-11
Attending: ADVANCED PRACTICE MIDWIFE
Payer: COMMERCIAL

## 2018-04-11 VITALS
WEIGHT: 199.2 LBS | HEART RATE: 80 BPM | BODY MASS INDEX: 31.27 KG/M2 | SYSTOLIC BLOOD PRESSURE: 124 MMHG | DIASTOLIC BLOOD PRESSURE: 81 MMHG | HEIGHT: 67 IN

## 2018-04-11 DIAGNOSIS — Z34.03 ENCOUNTER FOR SUPERVISION OF NORMAL FIRST PREGNANCY IN THIRD TRIMESTER: Primary | ICD-10-CM

## 2018-04-11 PROCEDURE — G0463 HOSPITAL OUTPT CLINIC VISIT: HCPCS | Mod: ZF

## 2018-04-11 NOTE — LETTER
"2018       RE: Susan Locke  [address]  [facility] [address]     Dear Colleague,    Thank you for referring your patient, Susan Locke, to the [facility] at [facility]. Please see a copy of my visit note below.    Subjective:     32 year old  at 38w0d presents for routine prenatal visit.            Denies  vaginal bleeding or leakage of fluid.  Has noticed a few contractions.  Reports regular fetal movement.        No HA, visual changes, RUQ or epigastric pain.   Patient concerns: Hopes for spontaneous labor,  Feeling well overall.  Objective:  Vitals:    18 1139   BP: 124/81   BP Location: Left arm   Patient Position: Chair   Pulse: 80   Weight: 90.4 kg (199 lb 3.2 oz)   Height: 1.702 m (5' 7\")    See OB flowsheet  Assessment/Plan     Encounter Diagnosis   Name Primary?     Encounter for supervision of normal first pregnancy in third trimester Yes     No orders of the defined types were placed in this encounter.    No orders of the defined types were placed in this encounter.    - Reviewed postdates testing including BPP => 41 weeks and rationale for induction of labor based on results.   Patient desires postdates testing.  - Reviewed why/how to contact provider if headache/visual changes/RUQ or epigastric pain, decreased fetal movement, vaginal bleeding, leakage of fluid or strong/regular contractions.   Patient education/orders or handouts today:  Sign/symptoms of labor, When to call for labor or other concerns, Postdates testing discussion, BPP and Induction of labor  Return to clinic in 1 week and prn if questions or concerns.       Again, thank you for allowing me to participate in the care of your patient.      Sincerely,    LEANDRA Judd CNM      "

## 2018-04-11 NOTE — MR AVS SNAPSHOT
After Visit Summary   4/11/2018    Susan Locke    MRN: [id number]           Patient Information     Date Of Birth          1985        Visit Information        Provider Department      4/11/2018 11:30 AM Brit Garcia APRN [facility]        Today's Diagnoses     Encounter for supervision of normal first pregnancy in third trimester    -  1       Follow-ups after your visit        Follow-up notes from your care team     Return in about 1 week (around 4/18/2018) for ROMELIA.      Your next 10 appointments already scheduled     Apr 18, 2018 11:30 AM CDT   RETURN OB with Vivienne Ahn CNM   [facility] ([facility])    [place] [facility] [address]  [address]  [address]  [facility] [address] [phone]            Apr 25, 2018 11:30 AM CDT   [facility]    [place] [facility] [address]  [address]  [address]  [facility] [address] [phone]              Who to contact     Please call your clinic at [phone] to:    Ask questions about your health    Make or cancel appointments    Discuss your medicines    Learn about your test results    Speak to your doctor            Additional Information About Your Visit        MyChart Information     [employer] gives you secure access to your electronic health record. If you see a primary care provider, you can also send messages to your care team and make appointments. If you have questions, please call your primary care clinic.  If you do not have a primary care provider, please call [phone] and they will assist you.      [employer] is an electronic gateway that provides easy, online access to your medical records. With [employer], you can request a clinic appointment, read your test results, renew a prescription or communicate with your care team.    "  To access your existing account, please contact your [facility] or call [phone] for assistance.        Care EveryWhere ID     This is your Care EveryWhere ID. This could be used by other organizations to access your [facility] medical records  [id number]        Your Vitals Were     Pulse Height Last Period BMI (Body Mass Index)          80 1.702 m (5' 7\") 07/19/2017 (Exact Date) 31.2 kg/m2         Blood Pressure from Last 3 Encounters:   04/11/18 124/81   04/04/18 112/72   03/29/18 114/75    Weight from Last 3 Encounters:   04/11/18 90.4 kg (199 lb 3.2 oz)   04/04/18 88.1 kg (194 lb 3.2 oz)   03/29/18 88.5 kg (195 lb)              Today, you had the following     No orders found for display       Primary Care Provider Fax #    Physician No Ref-Primary [phone]       No address on file        Equal Access to Services     JORGE SHEN : Hadii delio ibarrao Soken, waaxda luqadaha, qaybta kaalmada adeegyanadiya, amy field . So [facility] [phone].    ATENCIÓN: Si habla español, tiene a carrera disposición servicios gratuitos de asistencia lingüística. Llame al [phone].    We comply with applicable federal civil rights laws and [place] laws. We do not discriminate on the basis of race, color, national origin, age, disability, sex, sexual orientation, or gender identity.            Thank you!     Thank you for choosing [facility]  for your care. Our goal is always to provide you with excellent care. Hearing back from our patients is one way we can continue to improve our services. Please take a few minutes to complete the written survey that you may receive in the mail after your visit with us. Thank you!             Your Updated Medication List - Protect others around you: Learn how to safely use, store and throw away your medicines at www.disposemymeds.org.          This list is accurate as of 4/11/18 12:36 PM.  Always use " your most recent med list.                   Brand Name Dispense Instructions for use Diagnosis    PRENATAL VITAMIN PO           Psyllium Aguilar Elkins           pyridOXINE 25 MG tablet    vitamin B-6     Take 25 mg by mouth daily        VITAMIN D (CHOLECALCIFEROL) PO      Take 5,000 Units by mouth daily

## 2018-04-11 NOTE — PROGRESS NOTES
"Subjective:     32 year old  at 38w0d presents for routine prenatal visit.            Denies  vaginal bleeding or leakage of fluid.  Has noticed a few contractions.  Reports regular fetal movement.        No HA, visual changes, RUQ or epigastric pain.   Patient concerns: Hopes for spontaneous labor,  Feeling well overall.  Objective:  Vitals:    18 1139   BP: 124/81   BP Location: Left arm   Patient Position: Chair   Pulse: 80   Weight: 90.4 kg (199 lb 3.2 oz)   Height: 1.702 m (5' 7\")    See OB flowsheet  Assessment/Plan     Encounter Diagnosis   Name Primary?     Encounter for supervision of normal first pregnancy in third trimester Yes     No orders of the defined types were placed in this encounter.    No orders of the defined types were placed in this encounter.    - Reviewed postdates testing including BPP => 41 weeks and rationale for induction of labor based on results.   Patient desires postdates testing.  - Reviewed why/how to contact provider if headache/visual changes/RUQ or epigastric pain, decreased fetal movement, vaginal bleeding, leakage of fluid or strong/regular contractions.   Patient education/orders or handouts today:  Sign/symptoms of labor, When to call for labor or other concerns, Postdates testing discussion, BPP and Induction of labor  Return to clinic in 1 week and prn if questions or concerns.   LEANDRA Judd CNM    "

## 2018-04-18 ENCOUNTER — OFFICE VISIT (OUTPATIENT)
Dept: OBGYN | Facility: CLINIC | Age: 33
End: 2018-04-18
Attending: ADVANCED PRACTICE MIDWIFE
Payer: COMMERCIAL

## 2018-04-18 VITALS
BODY MASS INDEX: 31.69 KG/M2 | WEIGHT: 201.9 LBS | DIASTOLIC BLOOD PRESSURE: 72 MMHG | HEART RATE: 76 BPM | HEIGHT: 67 IN | SYSTOLIC BLOOD PRESSURE: 105 MMHG

## 2018-04-18 DIAGNOSIS — Z34.03 ENCOUNTER FOR SUPERVISION OF NORMAL FIRST PREGNANCY IN THIRD TRIMESTER: Primary | ICD-10-CM

## 2018-04-18 PROCEDURE — G0463 HOSPITAL OUTPT CLINIC VISIT: HCPCS | Mod: ZF

## 2018-04-18 ASSESSMENT — PAIN SCALES - GENERAL: PAINLEVEL: NO PAIN (0)

## 2018-04-18 NOTE — MR AVS SNAPSHOT
After Visit Summary   4/18/2018    Susan Locke    MRN: [id number]           Patient Information     Date Of Birth          1985        Visit Information        Provider Department      4/18/2018 11:30 AM Vivienne Ahn CNM [facility]        Today's Diagnoses     Encounter for supervision of normal first pregnancy in third trimester    -  1       Follow-ups after your visit        Follow-up notes from your care team     Return in about 1 week (around 4/25/2018) for ROMELIA- CNM in 1 week, BPP and ROMELIA in 2 weeks.      Your next 10 appointments already scheduled     Apr 25, 2018 11:30 AM CDT   RETURN OB with LEANDRA Kincaid CNM   [facility] ([facility])    [place] [facility] [address]  [address]  [address]  [facility] [id number] [phone]            May 02, 2018  8:00 AM CDT   ULTRASOUND with [facility] ULTRASOUND   [facility] ([facility])    [place] [facility] [address]  [address]  [address]  [facility] [id number] [phone]            May 02, 2018  8:30 AM CDT   RETURN OB with LEANDRA Ramos CNM   [facility] ([facility])    [place] [facility] [address]  [address]  [address]  [facility] [id number] [phone]              Who to contact     Please call your clinic at [phone] to:    Ask questions about your health    Make or cancel appointments    Discuss your medicines    Learn about your test results    Speak to your doctor            Additional Information About Your Visit        MyChart Information     [employer] gives you secure access to your electronic health record. If you see a primary care provider, you can also send messages to your care team and make appointments. If you have questions, please call your primary care clinic.  If you do not have a primary care provider, please  "call [phone] and they will assist you.      [employer] is an electronic gateway that provides easy, online access to your medical records. With [employer], you can request a clinic appointment, read your test results, renew a prescription or communicate with your care team.     To access your existing account, please contact your [facility] or call [phone] for assistance.        Care EveryWhere ID     This is your Care EveryWhere ID. This could be used by other organizations to access your [facility] medical records  [id number]        Your Vitals Were     Pulse Height Last Period BMI (Body Mass Index)          76 1.702 m (5' 7.01\") 07/19/2017 (Exact Date) 31.61 kg/m2         Blood Pressure from Last 3 Encounters:   04/18/18 105/72   04/11/18 124/81   04/04/18 112/72    Weight from Last 3 Encounters:   04/18/18 91.6 kg (201 lb 14.4 oz)   04/11/18 90.4 kg (199 lb 3.2 oz)   04/04/18 88.1 kg (194 lb 3.2 oz)               Primary Care Provider Office Phone # Fax #    Vivienne ThomasSalina Ahn, [facility] [phone]       [address] [place] [address]        Equal Access to Services     JORGE SHEN AH: Hadii delio ku hadasho Soomaali, waaxda luqadaha, qaybta kaalmada adeegyada, waxdeisy ruff hayaan ivette field . So [facility] [phone].    ATENCIÓN: Si habla español, tiene a carrera disposición servicios gratuitos de asistencia lingüística. Llame al [phone].    We comply with applicable federal civil rights laws and [place] laws. We do not discriminate on the basis of race, color, national origin, age, disability, sex, sexual orientation, or gender identity.            Thank you!     Thank you for choosing [facility]  for your care. Our goal is always to provide you with excellent care. Hearing back from our patients is one way we can continue to improve our services. Please take a few minutes to complete the written survey that you may " receive in the mail after your visit with us. Thank you!             Your Updated Medication List - Protect others around you: Learn how to safely use, store and throw away your medicines at www.disposemymeds.org.          This list is accurate as of 4/18/18 11:59 PM.  Always use your most recent med list.                   Brand Name Dispense Instructions for use Diagnosis    PRENATAL VITAMIN PO           Psyllium Husk Powd           pyridOXINE 25 MG tablet    vitamin B-6     Take 25 mg by mouth daily        VITAMIN D (CHOLECALCIFEROL) PO      Take 5,000 Units by mouth daily

## 2018-04-18 NOTE — PROGRESS NOTES
"Subjective:     33 year old  at 39w0d presents for routine prenatal visit.            Denies cramping or regular contractions. Denies vaginal bleeding or leakage of fluid. Reports +fetal movement.        No HA, visual changes, RUQ or epigastric pain.     Patient concerns:  Feeling well overall. Present with her sister who is visiting.   No concerns.    Objective:  Vitals:    18 1144   BP: 105/72   Pulse: 76   Weight: 91.6 kg (201 lb 14.4 oz)   Height: 1.702 m (5' 7.01\")      See OB flowsheet    Assessment/Plan     Encounter Diagnosis   Name Primary?     Encounter for supervision of normal first pregnancy in third trimester Yes     Orders Placed This Encounter   Procedures     BPP (Single) w/out NST (In Clinic)     - Reviewed why/how to contact provider if headache/visual changes/RUQ or epigastric pain, decreased fetal movement, vaginal bleeding, leakage of fluid or strong/regular contractions.   Patient education/orders or handouts today:  Sign/symptoms of labor, When to call for labor or other concerns, Postdates testing discussion, BPP, NST and Induction of labor    - Reviewed late term testing including BPP => 41 weeks and rationale for induction of labor based on results.   Patient desires spontaneous labor. Would like late term testing.    Plan BPP and ROMELIA at 41 weeks.   Continue scheduled prenatal care, RTC in 1 week for ROMELIA and in 2 weeks for ROMELIA and BPP and prn if questions or concerns.     LEANDRA Chicas, NGOZI      "

## 2018-04-18 NOTE — LETTER
"2018       RE: Susan Locke  [address]  [facility] [address]     Dear Colleague,    Thank you for referring your patient, Susan Locke, to the [facility] at [facility]. Please see a copy of my visit note below.    Subjective:     33 year old  at 39w0d presents for routine prenatal visit.            Denies cramping or regular contractions. Denies vaginal bleeding or leakage of fluid. Reports +fetal movement.        No HA, visual changes, RUQ or epigastric pain.     Patient concerns:  Feeling well overall. Present with her sister who is visiting.   No concerns.    Objective:  Vitals:    18 1144   BP: 105/72   Pulse: 76   Weight: 91.6 kg (201 lb 14.4 oz)   Height: 1.702 m (5' 7.01\")      See OB flowsheet    Assessment/Plan     Encounter Diagnosis   Name Primary?     Encounter for supervision of normal first pregnancy in third trimester Yes     Orders Placed This Encounter   Procedures     BPP (Single) w/out NST (In Clinic)     - Reviewed why/how to contact provider if headache/visual changes/RUQ or epigastric pain, decreased fetal movement, vaginal bleeding, leakage of fluid or strong/regular contractions.   Patient education/orders or handouts today:  Sign/symptoms of labor, When to call for labor or other concerns, Postdates testing discussion, BPP, NST and Induction of labor    - Reviewed late term testing including BPP => 41 weeks and rationale for induction of labor based on results.   Patient desires spontaneous labor. Would like late term testing.    Plan BPP and ROMELIA at 41 weeks.   Continue scheduled prenatal care, RTC in 1 week for ROMELIA and in 2 weeks for ROMELIA and BPP and prn if questions or concerns.       Again, thank you for allowing me to participate in the care of your patient.      Sincerely,    Vivienne Ahn CNM      "

## 2018-04-24 ENCOUNTER — TELEPHONE (OUTPATIENT)
Dept: OBGYN | Facility: CLINIC | Age: 33
End: 2018-04-24

## 2018-04-24 ENCOUNTER — HOSPITAL ENCOUNTER (INPATIENT)
Facility: CLINIC | Age: 33
LOS: 3 days | Discharge: HOME HEALTH | End: 2018-04-27
Attending: ADVANCED PRACTICE MIDWIFE | Admitting: ADVANCED PRACTICE MIDWIFE
Payer: COMMERCIAL

## 2018-04-24 ENCOUNTER — HOSPITAL ENCOUNTER (OUTPATIENT)
Facility: CLINIC | Age: 33
Discharge: HOME | End: 2018-04-24
Attending: ADVANCED PRACTICE MIDWIFE | Admitting: ADVANCED PRACTICE MIDWIFE
Payer: COMMERCIAL

## 2018-04-24 VITALS
HEART RATE: 74 BPM | HEIGHT: 67 IN | WEIGHT: 200 LBS | BODY MASS INDEX: 31.39 KG/M2 | TEMPERATURE: 98.4 F | SYSTOLIC BLOOD PRESSURE: 119 MMHG | DIASTOLIC BLOOD PRESSURE: 75 MMHG

## 2018-04-24 PROBLEM — Z36.89 ENCOUNTER FOR TRIAGE IN PREGNANT PATIENT: Status: ACTIVE | Noted: 2018-04-24

## 2018-04-24 PROBLEM — L30.9 DERMATITIS: Status: RESOLVED | Noted: 2017-06-12 | Resolved: 2018-04-24

## 2018-04-24 LAB — RUPTURE OF FETAL MEMBRANES BY ROM PLUS: POSITIVE

## 2018-04-24 PROCEDURE — 84112 EVAL AMNIOTIC FLUID PROTEIN: CPT | Performed by: ADVANCED PRACTICE MIDWIFE

## 2018-04-24 PROCEDURE — G0463 HOSPITAL OUTPT CLINIC VISIT: HCPCS

## 2018-04-24 PROCEDURE — 12000030 ZZH R&B OB INTERMEDIATE UMMC

## 2018-04-24 RX ORDER — METHYLERGONOVINE MALEATE 0.2 MG/ML
200 INJECTION INTRAVENOUS
Status: DISCONTINUED | OUTPATIENT
Start: 2018-04-24 | End: 2018-04-26

## 2018-04-24 RX ORDER — OXYCODONE AND ACETAMINOPHEN 5; 325 MG/1; MG/1
1 TABLET ORAL
Status: DISCONTINUED | OUTPATIENT
Start: 2018-04-24 | End: 2018-04-26

## 2018-04-24 RX ORDER — OXYTOCIN/0.9 % SODIUM CHLORIDE 30/500 ML
100-340 PLASTIC BAG, INJECTION (ML) INTRAVENOUS CONTINUOUS PRN
Status: DISCONTINUED | OUTPATIENT
Start: 2018-04-24 | End: 2018-04-26

## 2018-04-24 RX ORDER — CARBOPROST TROMETHAMINE 250 UG/ML
250 INJECTION, SOLUTION INTRAMUSCULAR
Status: DISCONTINUED | OUTPATIENT
Start: 2018-04-24 | End: 2018-04-26

## 2018-04-24 RX ORDER — ACETAMINOPHEN 325 MG/1
650 TABLET ORAL EVERY 4 HOURS PRN
Status: DISCONTINUED | OUTPATIENT
Start: 2018-04-24 | End: 2018-04-26

## 2018-04-24 RX ORDER — NALOXONE HYDROCHLORIDE 0.4 MG/ML
.1-.4 INJECTION, SOLUTION INTRAMUSCULAR; INTRAVENOUS; SUBCUTANEOUS
Status: DISCONTINUED | OUTPATIENT
Start: 2018-04-24 | End: 2018-04-25

## 2018-04-24 RX ORDER — FENTANYL CITRATE 50 UG/ML
50-100 INJECTION, SOLUTION INTRAMUSCULAR; INTRAVENOUS
Status: DISCONTINUED | OUTPATIENT
Start: 2018-04-24 | End: 2018-04-26

## 2018-04-24 RX ORDER — IBUPROFEN 800 MG/1
800 TABLET, FILM COATED ORAL
Status: COMPLETED | OUTPATIENT
Start: 2018-04-24 | End: 2018-04-26

## 2018-04-24 RX ORDER — ONDANSETRON 2 MG/ML
4 INJECTION INTRAMUSCULAR; INTRAVENOUS EVERY 6 HOURS PRN
Status: DISCONTINUED | OUTPATIENT
Start: 2018-04-24 | End: 2018-04-26

## 2018-04-24 RX ORDER — OXYTOCIN 10 [USP'U]/ML
10 INJECTION, SOLUTION INTRAMUSCULAR; INTRAVENOUS
Status: DISCONTINUED | OUTPATIENT
Start: 2018-04-24 | End: 2018-04-26

## 2018-04-24 NOTE — IP AVS SNAPSHOT
MRN:[id number]                      After Visit Summary   4/24/2018    Susan Locke    MRN: [id number]           Thank you!     Thank you for choosing [facility] for your care. Our goal is always to provide you with excellent care. Hearing back from our patients is one way we can continue to improve our services. Please take a few minutes to complete the written survey that you may receive in the mail after you visit with us. Thank you!        Patient Information     Date Of Birth          1985        About your hospital stay     You were admitted on:  April 24, 2018 You last received care in the:  [facility]    You were discharged on:  April 27, 2018        Reason for your hospital stay       Maternity care                  Who to Call     For medical emergencies, please call 911.  For non-urgent questions about your medical care, please call your primary care provider or clinic, [phone]          Attending Provider     Provider Specialty    Brit Garcia APRN CNM Midwives    [facility], [facility] [place] Stephanie A, APRN CN Midwives       Primary Care Provider Office Phone # Fax #    Vivienne Ahn [phone]      After Care Instructions     Activity       Review discharge instructions            Diet       Resume previous diet            Discharge Instructions - Postpartum visit       Schedule postpartum visit with your provider and return to clinic in 6 weeks.                  Your next 10 appointments already scheduled     May 02, 2018  8:00 AM CDT   ULTRASOUND with [facility] ULTRASOUND   [facility] ([facility])    [facility] [address] [facility] [address]   [phone]            May 02, 2018  8:30 AM CDT   RETURN OB with LEANDRA Ramos CNM   [facility] ([facility])    [facility]  [address]  [address]  [facility] [id number] [phone]              Further instructions from your care team       Postpartum Vaginal Delivery Instructions    Activity       Ask family and friends for help when you need it.    Do not place anything in your vagina for 6 weeks.    You are not restricted on other activities, but take it easy for a few weeks to allow your body to recover from delivery.  You are able to do any activities you feel up to that point.    No driving until you have stopped taking your pain medications (usually two weeks after delivery).     Call your health care provider if you have any of these symptoms:       Increased pain, swelling, redness, or fluid around your stiches from an episiotomy or perineal tear.    A fever above 100.4 F (38 C) with or without chills when placing a thermometer under your tongue.    You soak a sanitary pad with blood within 1 hour, or you see blood clots larger than a golf ball.    Bleeding that lasts more than 6 weeks.    Vaginal discharge that smells bad.    Severe pain, cramping or tenderness in your lower belly area.    A need to urinate more frequently (use the toilet more often), more urgently (use the toilet very quickly), or it burns when you urinate.    Nausea and vomiting.    Redness, swelling or pain around a vein in your leg.    Problems breastfeeding or a red or painful area on your breast.    Chest pain and cough or are gasping for air.    Problems coping with sadness, anxiety, or depression.  If you have any concerns about hurting yourself or the baby, call your provider immediately.     You have questions or concerns after you return home.     Keep your hands clean:  Always wash your hands before touching your perineal area and stitches.  This helps reduce your risk of infection.  If your hands aren't dirty, you may use an alcohol hand-rub to clean your hands. Keep your nails clean and short.        Pending Results     No  "orders found from 4/22/2018 to 4/25/2018.            Statement of Approval     Ordered          04/27/18 1236  I have reviewed and agree with all the recommendations and orders detailed in this document.  EFFECTIVE NOW     Approved and electronically signed by:  Kevin Ponce APRN CNM             Admission Information     Date & Time Provider Department Dept. Phone    4/24/2018 Misty Ferris APRN CNM [facility] [phone]      Your Vitals Were     Blood Pressure Pulse Temperature Respirations Height Weight    116/84 70 97.7  F (36.5  C) (Oral) 18 1.702 m (5' 7\") 90.7 kg (200 lb)    Last Period Pulse Oximetry BMI (Body Mass Index)             07/19/2017 (Exact Date) 99% 31.32 kg/m2         MyChart Information     [employer] gives you secure access to your electronic health record. If you see a primary care provider, you can also send messages to your care team and make appointments. If you have questions, please call your primary care clinic.  If you do not have a primary care provider, please call [phone] and they will assist you.        Care EveryWhere ID     This is your Care EveryWhere ID. This could be used by other organizations to access your [facility] medical records  [id number]        Equal Access to Services     JORGE SHEN AH: Cristiane ibararo Soken, waaxda luqadaha, qaybta kaalmada adeegyada, amy magdaleno. So [facility] [phone].    ATENCIÓN: Si habla español, tiene a carrera disposición servicios gratuitos de asistencia lingüística. Llgrayson al [phone].    We comply with applicable federal civil rights laws and [place] laws. We do not discriminate on the basis of race, color, national origin, age, disability, sex, sexual orientation, or gender identity.               Review of your medicines      START taking        Dose / Directions    acetaminophen 500 MG Caps        Dose:  2 capsule   Take 2 capsules by mouth every 6 hours as needed   Quantity:  60 capsule "   Refills:  1       ibuprofen 800 MG tablet   Commonly known as:  ADVIL/MOTRIN        Dose:  800 mg   Take 1 tablet (800 mg) by mouth every 8 hours as needed for moderate pain   Quantity:  60 tablet   Refills:  1       senna-docusate 8.6-50 MG per tablet   Commonly known as:  SENOKOT-S;PERICOLACE        Dose:  2 tablet   Take 2 tablets by mouth 2 times daily as needed for constipation   Quantity:  120 tablet   Refills:  1         CONTINUE these medicines which have NOT CHANGED        Dose / Directions    PRENATAL VITAMIN PO        Refills:  0       Psyllium Husk Powd        Refills:  0       pyridOXINE 25 MG tablet   Commonly known as:  vitamin B-6        Dose:  25 mg   Take 25 mg by mouth daily   Refills:  0       VITAMIN D (CHOLECALCIFEROL) PO        Dose:  5000 Units   Take 5,000 Units by mouth daily   Refills:  0            Where to get your medicines      These medications were sent to [place] [address] [place] [facility] [address] [facility]  [address] [facility] [address]     Phone:  [phone]     acetaminophen 500 MG Caps    ibuprofen 800 MG tablet    senna-docusate 8.6-50 MG per tablet                Protect others around you: Learn how to safely use, store and throw away your medicines at www.disposemymeds.org.             Medication List: This is a list of all your medications and when to take them. Check marks below indicate your daily home schedule. Keep this list as a reference.      Medications           Morning Afternoon Evening Bedtime As Needed    acetaminophen 500 MG Caps   Take 2 capsules by mouth every 6 hours as needed                                ibuprofen 800 MG tablet   Commonly known as:  ADVIL/MOTRIN   Take 1 tablet (800 mg) by mouth every 8 hours as needed for moderate pain   Last time this was given:  800 mg on 4/27/2018  9:11 AM                                PRENATAL VITAMIN PO                                Psyllium Husk Powd                                 pyridOXINE 25 MG tablet   Commonly known as:  vitamin B-6   Take 25 mg by mouth daily                                senna-docusate 8.6-50 MG per tablet   Commonly known as:  SENOKOT-S;PERICOLACE   Take 2 tablets by mouth 2 times daily as needed for constipation   Last time this was given:  2 tablets on 4/27/2018  9:11 AM                                VITAMIN D (CHOLECALCIFEROL) PO   Take 5,000 Units by mouth daily

## 2018-04-24 NOTE — PROGRESS NOTES
HOSPITAL TRIAGE NOTE  ===================    CHIEF COMPLAINT  ========================  Susan Locke is a 33 year old  female presenting to OB Triage today at 39w6d for evaluation of leaking vaginal fluid.    Patient's last menstrual period was 2017 (exact date). Estimated Date of Delivery: 2018 by LMP, consistent with an 8 week dating ultrasound.    HPI  ==================   Susan presents to OB Triage with her supportive  Walter and sister Shima for evaluation of rupture of membranes. She initially experienced leaking of clear fluid at 1300 today and has continued to leak clear fluid. She had a larger gush upon her arrival to OB Triage. Additionally she has been having contractions on and off for approximately 2 days. Currently she feels mild period cramps every 15 minutes. Her fetus has remained active. She had one episode of light bloody show with wiping earlier today but no brian vaginal bleeding.     Prenatal record and labs reviewed from [facility], through [email or web address] EMR.    CONTRACTIONS: mild, not felt by patient and every 6 minutes  ABDOMINAL PAIN: occasional cramping  FETAL MOVEMENT: active    VAGINAL BLEEDING: small  RUPTURE OF MEMBRANES: yes, moderate, clear  PELVIC PAIN: none    PREGNANCY COMPLICATIONS: none  OTHER: patient strongly desires waterbirth    REVIEW OF SYSTEMS  =====================  C: NEGATIVE for fever, chills  I: NEGATIVE for worrisome rashes, moles or lesions  E: NEGATIVE for vision changes or irritation  R: NEGATIVE for significant cough or SOB  CV: NEGATIVE for chest pain, palpitations or varicosities  GI: NEGATIVE for nausea, abdominal pain, heartburn, or change in bowel habits  : NEGATIVE for frequency, dysuria, or hematuria  M: NEGATIVE for significant arthralgias or myalgia  N: NEGATIVE for headache, weakness, dizziness or paresthesias  P: NEGATIVE for changes in mood or affect    PROBLEM LIST  ===============  Patient Active Problem  List    Diagnosis Date Noted     Encounter for triage in pregnant patient 2018     Priority: Medium     Thrombocytopenia (H) 2018     Priority: Medium     Platelets 147 at 36 wks  recheck in 1 week   18: Platelets 151, recheck on admission       Research study patient-check to see if patient has occlusive device when admitted for labor 2017     Priority: Medium     Low vitamin D level 09/15/2017     Priority: Medium     09/15/17 my c sent         Supervision of normal pregnancy, , [facility] CNM 2017     Priority: Medium     ERIC 18 by LMP  10/11/17- MFM US- first trimester screen done_wnl_,    needs AFP only at 15 weeks_declined___  2017 - Normal anatomy scan, placenta anterior, no previa.      18: Birth Preferences - Planning water birth.  Hep C negative, water birth consent signed 18.    3/29/18: GBS and CBC with plts-147  18: Plts [151]     18: BPP and ROMELIA at 41 weeks       Herniated disc, cervical 2012     Priority: Medium       HISTORIES  ==============  ALLERGIES:    No Known Allergies  PAST MEDICAL HISTORY  Past Medical History:   Diagnosis Date     Arthritis      Dermatitis 2017     H. pylori infection      Hay fever      Rheumatism      SOCIAL HISTORY  Social History     Social History     Marital status:      Spouse name: Walter     Number of children: N/A     Years of education: N/A     Occupational History     Not on file.     Social History Main Topics     Smoking status: Never Smoker     Smokeless tobacco: Never Used     Alcohol use No     Drug use: No     Sexual activity: Yes     Partners: Male     Other Topics Concern     Not on file     Social History Narrative     PARTNER:  Walter    FAMILY HISTORY  Family History   Problem Relation Age of Onset     Hypothyroidism Mother      Lung Cancer Father      Stomach Cancer Maternal Grandmother      Thyroid Disease Sister      OB HISTORY  Obstetric History       T0   P0   " A0   L0     SAB0   TAB0   Ectopic0   Multiple0   Live Births0       # Outcome Date GA Lbr Goyo/2nd Weight Sex Delivery Anes PTL Lv   1 Current                 Prenatal Labs:   Lab Results   Component Value Date    ABO O 09/13/2017    RH Pos 09/13/2017    AS Neg 09/13/2017    HEPBANG Nonreactive 09/13/2017    TREPAB Negative 01/24/2018    HGB 12.8 04/04/2018     Rubella- immune    ULTRASOUND(s) reviewed: First trimester and Level II Anatomy ultrasounds WNL.    EXAM  ============  /75  Pulse 74  Temp 98.4  F (36.9  C) (Oral)  Ht 1.702 m (5' 7\")  Wt 90.7 kg (200 lb)  LMP 07/19/2017 (Exact Date)  BMI 31.32 kg/m2  GENERAL APPEARANCE: healthy, alert and no distress  RESP: lungs clear to auscultation - no rales, rhonchi or wheezes  CV: regular rates and rhythm, normal S1 S2, no S3 or S4 and no murmur,and no varicosities  ABDOMEN:  soft, nontender, no epigastric pain. Fetus vertex on BSUS.   SKIN: no suspicious lesions or rashes  NEURO: Denies headache, blurred vision, other vision changes  PSYCH: mentation appears normal. and affect normal/bright  MS/ LEGS: Negative Joselin's     CONTRACTIONS: mild and every 6 minutes   FETAL HEART TONES: continuous EFM- baseline 140 with moderate variability and positive accelerations. No decelerations.  NST: REACTIVE  EFW: 6.75 lbs    PELVIC EXAM: deferred    ROM: yes, moderate, clear  AMNISURE: positive    LABS: none    DIAGNOSIS  ============  39w6d [facility] T[facility] for rule out rupture - Ruptured for clear fluid since 1300 today.  GBS negative  NST: REACTIVE  Fetal Heart rate tracing:category one    PLAN  ============  Counseling provided regarding the risks and benefits of immediate induction of labor versus expectant management for 12 hours in the setting of clear amniotic fluid, GBS negative, vertex presentation, category I tracing and no signs or symptoms of infection. Susan desires expectant management and verbalizes understanding that prolonged rupture of " membranes is a risk factor for intrauterine and  infection.   Briefly reviewed IOL methods including PO cytotec and pitocin  Reviewed that patient will risk out of water birth if ruptured >24 hours prior to birth  Warning signs and when to return to triage reviewed.   All of Susan's, her 's and sister's questions were answered.   Discharged to home with plan for pt to return to the [facility] at 0000 tonight if no spontaneous labor, sooner prn.     Misty Ferris, LEANDRA CNM

## 2018-04-24 NOTE — IP AVS SNAPSHOT
[facility]    [address] [facility] [address]    Phone:  [phone]                                       After Visit Summary   4/24/2018    Susan Locke    MRN: [id number]           After Visit Summary Signature Page     I have received my discharge instructions, and my questions have been answered. I have discussed any challenges I see with this plan with the nurse or doctor.    ..........................................................................................................................................  Patient/Patient Representative Signature      ..........................................................................................................................................  Patient Representative Print Name and Relationship to Patient    ..................................................               ................................................  Date                                            Time    ..........................................................................................................................................  Reviewed by Signature/Title    ...................................................              ..............................................  Date                                                            Time

## 2018-04-24 NOTE — IP AVS SNAPSHOT
[facility]    [address]    [place] [address]    Phone:  [phone]                                       After Visit Summary   4/24/2018    Susan Locke    MRN: [id number]           After Visit Summary Signature Page     I have received my discharge instructions, and my questions have been answered. I have discussed any challenges I see with this plan with the nurse or doctor.    ..........................................................................................................................................  Patient/Patient Representative Signature      ..........................................................................................................................................  Patient Representative Print Name and Relationship to Patient    ..................................................               ................................................  Date                                            Time    ..........................................................................................................................................  Reviewed by Signature/Title    ...................................................              ..............................................  Date                                                            Time

## 2018-04-24 NOTE — TELEPHONE ENCOUNTER
Susan is 39w6d and calling to report leaking of clear fluids and contractions about every 15 minutes. GBS negative. Some blood steaks in her fluids. Reviewed RN protocol and will have on call CNM contact pt. She understood plan and had no further questions.

## 2018-04-24 NOTE — TELEPHONE ENCOUNTER
Susan called to speak to a triage nurse at approximately 1315 today and per protocol I called to discuss her symptoms. She reports contractions for the past 2 nights that start and stop. Last night they started around 1700 and continued q 10 minutes until 0400, then progressed to q 5 minutes until about 1000 this morning, at which point they spaced to q 15 minutes. A little while ago she went to the bathroom and felt as though she peed herself 3 times in a row. Each time felt like a small gush. The fluid was clear. She also noted a small amount of pink spotting when she wiped. Fetus has remained active throughout.     Discussed recommendation to come to OB Triage to r/o rupture and discuss options moving forward. She agrees to this plan and expresses that she will not want IOL if it can be avoided.     LEANDRA Henning CNM

## 2018-04-24 NOTE — DISCHARGE INSTRUCTIONS
Discharge Instructions for Undelivered Patients    Diet:  * Drink 8 to 12 glasses of liquids (milk, juice, water) every day  * You may eat meals and snacks.    Activity:  * *  * Call your doctor or nurse midwife if your baby is moving less than usual.    Call your provider if you notice:  * Swelling in your face or increased swelling in your hands or legs.  * Headaches that are not relieved by Tylenol (acetaminophen).  * Changes in your vision (blurring; seeing spots or stars).  * Nausea (sick to your stomach) and vomiting (throwing up).  * Weight gain of 5 pounds per week.  * Heartburn that doesn't go away.  * Signs of bladder infection: Pain when you urinate (use the toilet), needing to go more often or more urgently.  * The bag of sarabia (membrane) breaks, or you notice leaking in your underwear.  * Bright red blood in your underwear.  * Abdominal (lower belly) or stomach pain.  * For first baby: Contractions (tightenings) less than 5 minutes apart for one hour or more.  * Second (plus) baby: Contractions (tightenings) less than 10 minutes apart and getting stronger.  * Increase or change in vaginal discharge (note the color and amount).    [phone]

## 2018-04-24 NOTE — PLAN OF CARE
Data: [facility] at 1455.   Reason for maternal/fetal assessment per patient is Contractions (x 2 days, more painful now)  . Patient is a . Prenatal record reviewed.      Obstetric History       T0      L0     SAB0   TAB0   Ectopic0   Multiple0   Live Births0       # Outcome Date GA Lbr Goyo/2nd Weight Sex Delivery Anes PTL Lv   1 Current                  Medical History:   Past Medical History:   Diagnosis Date     Arthritis      Dermatitis 2017     H. pylori infection      Hay fever      Rheumatism    . Gestational Age 39w6d. VSS. Cervix: not examined.  Fetal movement present. Patient denies, pelvic pressure, UTI symptoms, GI problems, bloody show, , edema, headache, visual disturbances, epigastric or URQ pain, abdominal pain,Support person,  and friend present.  Action: Verbal consent for EFM. Triage assessment completed. EFM applied for reactive tracing. Uterine assessment : contractions every 6 minutes. Fetal assessment: Presumed adequate fetal oxygenation documented (see flow record).. Patient instructed to report change in fetal movement, vaginal leaking of fluid or bleeding, abdominal pain, or any concerns related to the pregnancy to her nurse/physician. ROM + is positive.  Response:YULIANA Ferris CNM informed of arrival. Plan per provider is DC to home for expectant management. Pt will return in less than 24 hours for induction of labor if necessary. Pt would like a water birth. Patient verbalized understanding of education and verbalized agreement with plan. Discharged ambulatory at 1640.

## 2018-04-24 NOTE — IP AVS SNAPSHOT
MRN:[id number]                      After Visit Summary   4/24/2018    Susan Locke    MRN: [id number]           Thank you!     Thank you for choosing [facility] for your care. Our goal is always to provide you with excellent care. Hearing back from our patients is one way we can continue to improve our services. Please take a few minutes to complete the written survey that you may receive in the mail after you visit with us. Thank you!        Patient Information     Date Of Birth          1985        Designated Caregiver       Most Recent Value    Caregiver    Will someone help with your care after discharge? no      About your hospital stay     You were admitted on:  April 24, 2018 You last received care in the:  [facility]    You were discharged on:  April 24, 2018       Who to Call     For medical emergencies, please call 911.  For non-urgent questions about your medical care, please call your primary care provider or clinic, [phone]          Attending Provider     Provider Specialty    Misty Ferris APRN [facility]    [place] APRN [facility]       Primary Care Provider Office Phone # Fax #    Vivienne Kendra Ahn [facility] [phone]      Your next 10 appointments already scheduled     Apr 25, 2018 11:30 AM CDT   RETURN OB with LEANDRA Kincaid CNM   [facility] ([facility])    [place] [place] [facility] [address]  [address] [facility] [address] [phone]            May 02, 2018  8:00 AM CDT   ULTRASOUND with [facility] ULTRASOUND   [facility] ([facility])    [facility] [facility] [address]  [address]  [address]  [facility] [address] [phone]            May 02, 2018  8:30 AM CDT   RETURN OB with LEANDRA Ramos CNM   [facility] ([facility])    [place] [facility] [address]  [address]  [address] [facility] [id number] [phone]              Further instructions from your care team       Discharge Instructions for Undelivered Patients    Diet:  * Drink 8 to 12 glasses of liquids (milk, juice, water) every day  * You may eat meals and snacks.    Activity:  * *  * Call your doctor or nurse midwife if your baby is moving less than usual.    Call your provider if you notice:  * Swelling in your face or increased swelling in your hands or legs.  * Headaches that are not relieved by Tylenol (acetaminophen).  * Changes in your vision (blurring; seeing spots or stars).  * Nausea (sick to your stomach) and vomiting (throwing up).  * Weight gain of 5 pounds per week.  * Heartburn that doesn't go away.  * Signs of bladder infection: Pain when you urinate (use the toilet), needing to go more often or more urgently.  * The bag of sarabia (membrane) breaks, or you notice leaking in your underwear.  * Bright red blood in your underwear.  * Abdominal (lower belly) or stomach pain.  * For first baby: Contractions (tightenings) less than 5 minutes apart for one hour or more.  * Second (plus) baby: Contractions (tightenings) less than 10 minutes apart and getting stronger.  * Increase or change in vaginal discharge (note the color and amount).    [phone]      Pending Results     No orders found from 4/22/2018 to 4/25/2018.            Statement of Approval     Ordered          04/24/18 1324  I have reviewed and agree with all the recommendations and orders detailed in this document.  EFFECTIVE NOW     Approved and electronically signed by:  Misty Ferris APRN CNM             Admission Information     Date & Time Provider Department Dept. Phone    4/24/2018 Misty Ferris APRN CNM [facility] [phone]      Your Vitals Were     Blood Pressure Pulse Temperature Height Weight Last Period    119/75 74 98.4  F (36.9  C) (Oral) 1.702 m (5' 7\") 90.7 kg (200 lb) 07/19/2017 (Exact Date)    " BMI (Body Mass Index)                   31.32 kg/m2           [employer] Information     [employer] gives you secure access to your electronic health record. If you see a primary care provider, you can also send messages to your care team and make appointments. If you have questions, please call your primary care clinic.  If you do not have a primary care provider, please call [phone] and they will assist you.        Care EveryWhere ID     This is your Care EveryWhere ID. This could be used by other organizations to access your [place] medical records  [id number]        Equal Access to Services     [facility] : Hadii delio harrison Soken, waaxda luqadaha, qaybandres kaalmanadiya murray, amy field . So [facility] [phone].    ATENCIÓN: Si habla español, tiene a carrera disposición servicios gratuitos de asistencia lingüística. Llame al [phone].    We comply with applicable federal civil rights laws and [place] laws. We do not discriminate on the basis of race, color, national origin, age, disability, sex, sexual orientation, or gender identity.               Review of your medicines      UNREVIEWED medicines. Ask your doctor about these medicines        Dose / Directions    PRENATAL VITAMIN PO        Refills:  0       Psyllium Husk Powd        Refills:  0       pyridOXINE 25 MG tablet   Commonly known as:  vitamin B-6        Dose:  25 mg   Take 25 mg by mouth daily   Refills:  0       VITAMIN D (CHOLECALCIFEROL) PO        Dose:  5000 Units   Take 5,000 Units by mouth daily   Refills:  0                Protect others around you: Learn how to safely use, store and throw away your medicines at www.disposemymeds.org.             Medication List: This is a list of all your medications and when to take them. Check marks below indicate your daily home schedule. Keep this list as a reference.      Medications           Morning Afternoon Evening Bedtime As Needed    PRENATAL VITAMIN PO                                 Psyllium Husk Brittni                                pyridOXINE 25 MG tablet   Commonly known as:  vitamin B-6   Take 25 mg by mouth daily                                VITAMIN D (CHOLECALCIFEROL) PO   Take 5,000 Units by mouth daily

## 2018-04-25 ENCOUNTER — ANESTHESIA EVENT (OUTPATIENT)
Dept: OBGYN | Facility: CLINIC | Age: 33
End: 2018-04-25
Payer: COMMERCIAL

## 2018-04-25 ENCOUNTER — ANESTHESIA (OUTPATIENT)
Dept: OBGYN | Facility: CLINIC | Age: 33
End: 2018-04-25
Payer: COMMERCIAL

## 2018-04-25 LAB
ABO + RH BLD: NORMAL
ABO + RH BLD: NORMAL
BLD GP AB SCN SERPL QL: NORMAL
BLOOD BANK CMNT PATIENT-IMP: NORMAL
ERYTHROCYTE [DISTWIDTH] IN BLOOD BY AUTOMATED COUNT: 13.1 % (ref 10–15)
HCT VFR BLD AUTO: 37.7 % (ref 35–47)
HGB BLD-MCNC: 12.8 G/DL (ref 11.7–15.7)
MCH RBC QN AUTO: 32.9 PG (ref 26.5–33)
MCHC RBC AUTO-ENTMCNC: 34 G/DL (ref 31.5–36.5)
MCV RBC AUTO: 97 FL (ref 78–100)
PLATELET # BLD AUTO: 157 10E9/L (ref 150–450)
RBC # BLD AUTO: 3.89 10E12/L (ref 3.8–5.2)
SPECIMEN EXP DATE BLD: NORMAL
T PALLIDUM IGG+IGM SER QL: NEGATIVE
WBC # BLD AUTO: 11.5 10E9/L (ref 4–11)

## 2018-04-25 PROCEDURE — 3E033VJ INTRODUCTION OF OTHER HORMONE INTO PERIPHERAL VEIN, PERCUTANEOUS APPROACH: ICD-10-PCS | Performed by: ADVANCED PRACTICE MIDWIFE

## 2018-04-25 PROCEDURE — 25000125 ZZHC RX 250: Performed by: ADVANCED PRACTICE MIDWIFE

## 2018-04-25 PROCEDURE — G0463 HOSPITAL OUTPT CLINIC VISIT: HCPCS

## 2018-04-25 PROCEDURE — 25000128 H RX IP 250 OP 636: Performed by: ADVANCED PRACTICE MIDWIFE

## 2018-04-25 PROCEDURE — 12000030 ZZH R&B OB INTERMEDIATE UMMC

## 2018-04-25 PROCEDURE — 86901 BLOOD TYPING SEROLOGIC RH(D): CPT | Performed by: ADVANCED PRACTICE MIDWIFE

## 2018-04-25 PROCEDURE — 85027 COMPLETE CBC AUTOMATED: CPT | Performed by: ADVANCED PRACTICE MIDWIFE

## 2018-04-25 PROCEDURE — 86780 TREPONEMA PALLIDUM: CPT | Performed by: ADVANCED PRACTICE MIDWIFE

## 2018-04-25 PROCEDURE — 25000128 H RX IP 250 OP 636

## 2018-04-25 PROCEDURE — 25000125 ZZHC RX 250

## 2018-04-25 PROCEDURE — 36415 COLL VENOUS BLD VENIPUNCTURE: CPT | Performed by: ADVANCED PRACTICE MIDWIFE

## 2018-04-25 PROCEDURE — 86850 RBC ANTIBODY SCREEN: CPT | Performed by: ADVANCED PRACTICE MIDWIFE

## 2018-04-25 PROCEDURE — 00HU33Z INSERTION OF INFUSION DEVICE INTO SPINAL CANAL, PERCUTANEOUS APPROACH: ICD-10-PCS | Performed by: ANESTHESIOLOGY

## 2018-04-25 PROCEDURE — 25000132 ZZH RX MED GY IP 250 OP 250 PS 637: Performed by: ADVANCED PRACTICE MIDWIFE

## 2018-04-25 PROCEDURE — 86900 BLOOD TYPING SEROLOGIC ABO: CPT | Performed by: ADVANCED PRACTICE MIDWIFE

## 2018-04-25 PROCEDURE — 3E0R3BZ INTRODUCTION OF ANESTHETIC AGENT INTO SPINAL CANAL, PERCUTANEOUS APPROACH: ICD-10-PCS | Performed by: ANESTHESIOLOGY

## 2018-04-25 RX ORDER — FENTANYL CITRATE 50 UG/ML
100 INJECTION, SOLUTION INTRAMUSCULAR; INTRAVENOUS
Status: DISCONTINUED | OUTPATIENT
Start: 2018-04-25 | End: 2018-04-26

## 2018-04-25 RX ORDER — EPHEDRINE SULFATE 50 MG/ML
5 INJECTION, SOLUTION INTRAMUSCULAR; INTRAVENOUS; SUBCUTANEOUS
Status: DISCONTINUED | OUTPATIENT
Start: 2018-04-25 | End: 2018-04-27 | Stop reason: HOSPADM

## 2018-04-25 RX ORDER — MISOPROSTOL 100 UG/1
25 TABLET ORAL
Status: DISCONTINUED | OUTPATIENT
Start: 2018-04-25 | End: 2018-04-25

## 2018-04-25 RX ORDER — LIDOCAINE HYDROCHLORIDE AND EPINEPHRINE 15; 5 MG/ML; UG/ML
3 INJECTION, SOLUTION EPIDURAL
Status: DISCONTINUED | OUTPATIENT
Start: 2018-04-25 | End: 2018-04-27 | Stop reason: HOSPADM

## 2018-04-25 RX ORDER — HYDROXYZINE HYDROCHLORIDE 50 MG/1
100 TABLET, FILM COATED ORAL
Status: DISCONTINUED | OUTPATIENT
Start: 2018-04-25 | End: 2018-04-26

## 2018-04-25 RX ORDER — SODIUM CHLORIDE, SODIUM LACTATE, POTASSIUM CHLORIDE, CALCIUM CHLORIDE 600; 310; 30; 20 MG/100ML; MG/100ML; MG/100ML; MG/100ML
INJECTION, SOLUTION INTRAVENOUS CONTINUOUS
Status: DISCONTINUED | OUTPATIENT
Start: 2018-04-25 | End: 2018-04-26

## 2018-04-25 RX ORDER — EPHEDRINE SULFATE 50 MG/ML
INJECTION, SOLUTION INTRAMUSCULAR; INTRAVENOUS; SUBCUTANEOUS
Status: DISCONTINUED
Start: 2018-04-25 | End: 2018-04-26 | Stop reason: WASHOUT

## 2018-04-25 RX ORDER — OXYTOCIN/0.9 % SODIUM CHLORIDE 30/500 ML
1-24 PLASTIC BAG, INJECTION (ML) INTRAVENOUS CONTINUOUS
Status: DISCONTINUED | OUTPATIENT
Start: 2018-04-25 | End: 2018-04-26

## 2018-04-25 RX ORDER — NALBUPHINE HYDROCHLORIDE 10 MG/ML
2.5-5 INJECTION, SOLUTION INTRAMUSCULAR; INTRAVENOUS; SUBCUTANEOUS EVERY 6 HOURS PRN
Status: DISCONTINUED | OUTPATIENT
Start: 2018-04-25 | End: 2018-04-27 | Stop reason: HOSPADM

## 2018-04-25 RX ORDER — LIDOCAINE HYDROCHLORIDE AND EPINEPHRINE 15; 5 MG/ML; UG/ML
INJECTION, SOLUTION EPIDURAL PRN
Status: DISCONTINUED | OUTPATIENT
Start: 2018-04-25 | End: 2018-12-11 | Stop reason: HOSPADM

## 2018-04-25 RX ORDER — NALOXONE HYDROCHLORIDE 0.4 MG/ML
.1-.4 INJECTION, SOLUTION INTRAMUSCULAR; INTRAVENOUS; SUBCUTANEOUS
Status: DISCONTINUED | OUTPATIENT
Start: 2018-04-25 | End: 2018-04-27 | Stop reason: HOSPADM

## 2018-04-25 RX ORDER — LIDOCAINE 40 MG/G
CREAM TOPICAL
Status: DISCONTINUED | OUTPATIENT
Start: 2018-04-25 | End: 2018-04-26

## 2018-04-25 RX ORDER — LIDOCAINE HYDROCHLORIDE 20 MG/ML
INJECTION, SOLUTION EPIDURAL; INFILTRATION; INTRACAUDAL; PERINEURAL PRN
Status: DISCONTINUED | OUTPATIENT
Start: 2018-04-25 | End: 2018-12-11 | Stop reason: HOSPADM

## 2018-04-25 RX ADMIN — Medication 10 ML/HR: at 23:40

## 2018-04-25 RX ADMIN — Medication 25 MCG: at 12:06

## 2018-04-25 RX ADMIN — Medication 5 ML: at 23:33

## 2018-04-25 RX ADMIN — SODIUM CHLORIDE, POTASSIUM CHLORIDE, SODIUM LACTATE AND CALCIUM CHLORIDE: 600; 310; 30; 20 INJECTION, SOLUTION INTRAVENOUS at 18:05

## 2018-04-25 RX ADMIN — Medication 1 MILLI-UNITS/MIN: at 18:05

## 2018-04-25 RX ADMIN — Medication 25 MCG: at 10:06

## 2018-04-25 RX ADMIN — Medication 8 ML: at 23:40

## 2018-04-25 RX ADMIN — SODIUM CHLORIDE, POTASSIUM CHLORIDE, SODIUM LACTATE AND CALCIUM CHLORIDE 1000 ML: 600; 310; 30; 20 INJECTION, SOLUTION INTRAVENOUS at 23:02

## 2018-04-25 RX ADMIN — LIDOCAINE HYDROCHLORIDE,EPINEPHRINE BITARTRATE 3 ML: 15; .005 INJECTION, SOLUTION EPIDURAL; INFILTRATION; INTRACAUDAL; PERINEURAL at 23:37

## 2018-04-25 NOTE — PROGRESS NOTES
"Labor progress note    S:  Patient has been sleeping between cares since around 0200.  Reports that contractions are the same intensity as when she first arrived.      O:  Blood pressure 107/58, temperature 98.1  F (36.7  C), temperature source Oral, resp. rate 16, height 1.702 m (5' 7\"), weight 90.7 kg (200 lb), last menstrual period 2017, not currently breastfeeding.  General appearance: comfortable.  Contractions: rare  FHT: Baseline 130 with moderate variability. Accelerations are present. No decelerations present.  ROM: clear fluid. Membranes have been ruptured for 17 hours.  Pelvic exam: deferred      A:  IUP @ 40w0d rupture of membranes with no labor   Fetal Heart rate tracing Category category one  GBS- negative  Patient Active Problem List   Diagnosis     Supervision of normal pregnancy, , WHS CNM     Low vitamin D level     Herniated disc, cervical     Research study patient-check to see if patient has occlusive device when admitted for labor     Thrombocytopenia (H)     Encounter for triage in pregnant patient     Labor and delivery, indication for care         P:  Patient sleeping at this time, will need further discussion about cervical ripening with now irregular contractions.  Patient may consider options after 24 hrs s/p rupture of membranes.     LEANDRA JuddM  "

## 2018-04-25 NOTE — PLAN OF CARE
Data: Patient presented to [facility] at 2309.   Reason for maternal/fetal assessment per patient is Laboring  .  Patient is a . Prenatal record reviewed.      Obstetric History       T0      L0     SAB0   TAB0   Ectopic0   Multiple0   Live Births0       # Outcome Date GA Lbr Goyo/2nd Weight Sex Delivery Anes PTL Lv   1 Current               . Medical history:   Past Medical History:   Diagnosis Date     Arthritis      Dermatitis 2017     H. pylori infection      Hay fever      Rheumatism    . Gestational Age 40w0d. VSS. Fetal movement present. Patient denies backache, vaginal discharge, pelvic pressure, UTI symptoms, GI problems, bloody show, vaginal bleeding, edema, headache, visual disturbances, epigastric or URQ pain, abdominal pain. Support persons are present.  Action: Verbal consent for EFM. Triage assessment completed. EFM applied for FHR. Uterine assessment by TOCO. Fetal assessment: Presumed adequate fetal oxygenation documented (see flow record).   Response: Brit Garcia CNM informed of arrival. Plan per provider is admit pt. Patient verbalized agreement with plan. Patient transferred to room 442 ambulatory, oriented to room and call light.

## 2018-04-25 NOTE — PROGRESS NOTES
"Blood pressure 119/74, temperature 98.5  F (36.9  C), temperature source Oral, resp. rate 18, height 1.702 m (5' 7\"), weight 90.7 kg (200 lb), last menstrual period 07/19/2017, not currently breastfeeding.  Patient Vitals for the past 24 hrs:   BP Temp Temp src Resp Height Weight   04/25/18 1345 119/74 98.5  F (36.9  C) Oral 18 - -   04/25/18 1245 131/81 - - - - -   04/25/18 1200 107/67 98.4  F (36.9  C) Oral 16 - -   04/25/18 1100 100/58 - - - - -   04/25/18 1005 108/64 - - 16 - -   04/25/18 0945 - 98.5  F (36.9  C) Oral - - -   04/25/18 0725 121/76 98.3  F (36.8  C) Oral 18 - -   04/25/18 0338 107/58 98.1  F (36.7  C) Oral 16 - -   04/24/18 2319 - - - - 1.702 m (5' 7\") 90.7 kg (200 lb)   04/24/18 2314 118/75 97.6  F (36.4  C) Oral 18 - -     General appearance: Patient has been ambulating, upright positioning. Now in bed eating lunch. States she feels more contractions and \"they are much stronger.\" Breathing calmly through contractions.  CONTRACTIONS: every 3-6 minutes  Pitocin- none,  Antibiotics- none  FETAL HEART TONES: continuous EFM- baseline 125 with moderate variability and positive accelerations. No decelerations.  ROM: clear fluid  PELVIC EXAM:deferred    # Pain Assessment:  Current Pain Score 4/25/2018   Patient currently in pain? yes   Pain location Uterine   Pain descriptors Cramping   - Ssuan is experiencing pain due to contractions. Pain management was discussed and the plan was created in a collaborative fashion.  Susan's response to the current recommendations: engaged  - Desires unmedicated labor and and birth. Have reviewed pain management options. Declines at this time.     ASSESSMENT:  ==============  IUP @ 40w0d with SROM without labor   Fetal Heart Rate Tracing category one  GBS- negative    Ruptured x 25 hours  Afebrile, clear fluid    S/p miso x 2 (#1 1006, #2 1206)    Thrombocytopenia- plts on admit wnl at 157  Abo/rh type and screen    Patient Active Problem List   Diagnosis     " Supervision of normal pregnancy, , WHS CNM     Low vitamin D level     Herniated disc, cervical     Research study patient-check to see if patient has occlusive device when admitted for labor     Thrombocytopenia (H)     Encounter for triage in pregnant patient     Labor and delivery, indication for care     Labor and delivery indication for care or intervention      PLAN:  ===========  Ambulation, hydration, position changes, birthing ball/sling and tub options to facilitate labor.  No IV in place at this time per pt request. Discussed indications for placement- pt agreeable to have iv placed if needed.  PO cytotec #3 held at this time d/t frequency of contractions. Continue to monitor- administer when appropriate.  MD consultant on call Dr. Burgos/ LEANDRA Manuel, NGOZI

## 2018-04-25 NOTE — PROGRESS NOTES
"Blood pressure 121/76, temperature 98.3  F (36.8  C), temperature source Oral, resp. rate 18, height 1.702 m (5' 7\"), weight 90.7 kg (200 lb), last menstrual period 2017, not currently breastfeeding.  Patient Vitals for the past 24 hrs:   BP Temp Temp src Resp Height Weight   18 0725 121/76 98.3  F (36.8  C) Oral 18 - -   18 0338 107/58 98.1  F (36.7  C) Oral 16 - -   18 2319 - - - - 1.702 m (5' 7\") 90.7 kg (200 lb)   18 2314 118/75 97.6  F (36.4  C) Oral 18 - -     General appearance: Sound asleep when entered room. Wearing sleep mask. Had to wake pt up to talk. Feeling comfortable- was feeling cramping earlier but states it has now spaced out. Open to discussing starting cervical ripening with po miso and potential induction with pitocin. \"Cannot believe\" that her water broke without labor. Concerned about \"how long this might take.\" Discussed realistic timing and coping skills.  CONTRACTIONS: irreg and not palpable  Pitocin- none,  Antibiotics- none  FETAL HEART TONES: IA fhts 120s, no decreases.  Transitioned to EFM in anticipation of cervical ripening.  continuous EFM- baseline 125 with moderate variability and positive accelerations. No decelerations.  ROM: clear fluid  PELVIC EXAM:deferred  Hartman score 4 @ 2435    # Pain Assessment:  Current Pain Score 2018   Patient currently in pain? yes   Pain location Uterine   Pain descriptors Cramping   Susan s pain level was assessed and she currently denies pain.      ASSESSMENT:  ==============  IUP @ 40w0d with SROM without labor   Previously IA- fht 120s, no decreases heard.  Now on continuous EFM and toco- Fetal Heart Rate Tracing category one  GBS- negative    Ruptured x 21 hours  Afebrile, clear fluid    Hartman score 4 @ 2345  PO miso ordered (#1 @ 1007)    Thrombocytopenia- plts 157 on admit  Has abo/rh t&s, expires     Patient Active Problem List   Diagnosis     Supervision of normal pregnancy, , WHS CNM     Low " vitamin D level     Herniated disc, cervical     Research study patient-check to see if patient has occlusive device when admitted for labor     Thrombocytopenia (H)     Encounter for triage in pregnant patient     Labor and delivery, indication for care     Labor and delivery indication for care or intervention        PLAN:  ===========  Extensive discussion re: rupture of membranes without labor. Reviewed how contractions have not increased in frequency or intensity. Reviewed last cervical exam and becerra score of 4.  In depth discussion re: risk of intrauterine and  infection with prolonged rupture of membranes. Reviewed increased risk of infection with increasing time, especially after 24 hours. Reviewed research showing differences between infection risk with expectant vs active management.   Based off irregularity of contractions and becerra score of 4, recommended cervical ripening with PO misoprostol.  Reviewed r/b/a of PO misoprostol and requirement for continuous EFM and toco.   Reviewed goal of vaginal delivery; discussed IV pitocin after PO misoprostol.   Did discuss possible indications for  section.   Patient asked appropriate questions and verbalized understanding. All questions and concerns addressed. Pt agreed with plan of care.  Orders placed for PO misoprostol [id number] per protocol.  Will reevaluate/check cervix if change in status or if unable to repeat miso dosing d/t frequency of contractions.     Discussed ambulation, hydration, position changes, birthing ball/sling and tub options to facilitate labor. Will get telemetry monitoring.   Pain medication options reviewed with pt. Pt is interested in unmedicated labor.  Reviewed pain management options, including nitrous oxide, IM and IV medication and epidural.     First dose of PO miso given @ 1007.   Continue to monitor fluid color, maternal temperature and FHR closely.     MD consultant on call Dr. Burgos/ available prn      LEANDRA Chicas, NGOZI

## 2018-04-25 NOTE — PLAN OF CARE
Problem: Labor (Cervical Ripen, Induct, Augment) (Adult,Obstetrics,Pediatric)  Goal: Signs and Symptoms of Listed Potential Problems Will be Absent, Minimized or Managed (Labor)  Signs and symptoms of listed potential problems will be absent, minimized or managed by discharge/transition of care (reference Labor (Cervical Ripen, Induct, Augment) (Adult,Obstetrics,Pediatric) CPG).   Outcome: Improving  Pitocin started per protocol at 1 mu.  Patient using nitrous for help with pain and breathing through contractions well.

## 2018-04-25 NOTE — PROGRESS NOTES
"Blood pressure 100/58, temperature 98.5  F (36.9  C), temperature source Oral, resp. rate 16, height 1.702 m (5' 7\"), weight 90.7 kg (200 lb), last menstrual period 2017, not currently breastfeeding.  Patient Vitals for the past 24 hrs:   BP Temp Temp src Resp Height Weight   18 1100 100/58 - - - - -   18 1005 108/64 - - 16 - -   18 0945 - 98.5  F (36.9  C) Oral - - -   18 0725 121/76 98.3  F (36.8  C) Oral 18 - -   18 0338 107/58 98.1  F (36.7  C) Oral 16 - -   18 2319 - - - - 1.702 m (5' 7\") 90.7 kg (200 lb)   18 2314 118/75 97.6  F (36.4  C) Oral 18 - -     General appearance: Was able to sleep during the night. Contractions very spaced. Pt planning to shower and eat breakfast. After this she is amenable to discussing the plan of care again; I.e. Active vs expectant management.  CONTRACTIONS: irregular q5-15minutes, feels occasional mild cramping  Pitocin- none,  Antibiotics- none  FETAL HEART TONES: continuous EFM- baseline 125 with moderate variability and positive accelerations. No decelerations.  ROM: clear fluid  PELVIC EXAM:deferred    # Pain Assessment:  Current Pain Score 2018   Patient currently in pain? yes   Pain location Uterine   Pain descriptors Cramping   Susan s pain level was assessed and she currently denies pain.      ASSESSMENT:  ==============  IUP @ 40w0d with SROM without labor   Fetal Heart Rate Tracing category one  GBS- negative    Ruptured x 18 hours  Afebrile, clear fluid    Patient Active Problem List   Diagnosis     Supervision of normal pregnancy, , WHS CNM     Low vitamin D level     Herniated disc, cervical     Research study patient-check to see if patient has occlusive device when admitted for labor     Thrombocytopenia (H)     Encounter for triage in pregnant patient     Labor and delivery, indication for care     Labor and delivery indication for care or intervention        PLAN:  ===========  Plan to discuss " recommendation for active management d/t rupture of membranes with no change in contraction frequency or intensity.  Pt will shower and eat breakfast first.    LEANDRA Chicas, EDM

## 2018-04-25 NOTE — PROGRESS NOTES
"Blood pressure 121/77, temperature 98.1  F (36.7  C), temperature source Oral, resp. rate 16, height 1.702 m (5' 7\"), weight 90.7 kg (200 lb), last menstrual period 07/19/2017, not currently breastfeeding.  Patient Vitals for the past 24 hrs:   BP Temp Temp src Resp Height Weight   04/25/18 1530 121/77 98.1  F (36.7  C) Oral 16 - -   04/25/18 1345 119/74 98.5  F (36.9  C) Oral 18 - -   04/25/18 1245 131/81 - - - - -   04/25/18 1200 107/67 98.4  F (36.9  C) Oral 16 - -   04/25/18 1100 100/58 - - - - -   04/25/18 1005 108/64 - - 16 - -   04/25/18 0945 - 98.5  F (36.9  C) Oral - - -   04/25/18 0725 121/76 98.3  F (36.8  C) Oral 18 - -   04/25/18 0338 107/58 98.1  F (36.7  C) Oral 16 - -   04/24/18 2319 - - - - 1.702 m (5' 7\") 90.7 kg (200 lb)   04/24/18 2314 118/75 97.6  F (36.4  C) Oral 18 - -     General appearance: Patient stated contractions were more uncomfortable and \"not tolerable.\" States she thinks ctx are [id number]. Reports intensity is the same as last night but they are closer together. Have not been able to redose miso at 1400 d/t frequency of contractions.   CONTRACTIONS: now >5 minutes apart  Pitocin- none,  Antibiotics- none  FETAL HEART TONES: continuous EFM- baseline 125 with moderate variability and positive accelerations. No decelerations.  ROM: clear fluid  PELVIC EXAM:deferred  # Pain Assessment:  Current Pain Score 4/25/2018   Patient currently in pain? yes   Pain location Uterine   Pain descriptors Cramping   - Susan is experiencing pain due to labor contractions. Pain management was discussed with Susan and her spouse and the plan was created in a collaborative fashion.  Susan's response to the current recommendations: engaged  - Reviewed IM and IV pain medications and epidural. Reviewed nitrous oxide. Patient interested in nitrous oxide.       ASSESSMENT:  ==============  IUP @ 40w0d with SROM without labor   Previously IA- fht 120s, no decreases heard.  Now on continuous EFM and toco- Fetal " Heart Rate Tracing category one  GBS- negative     Ruptured x 21 hours  Afebrile, clear fluid     Hartman score 4 @ 2345  PO miso ordered (#1 @ 1007, #77350)     Thrombocytopenia- plts 157 on admit  Has abo/rh t&s, expires     Patient Active Problem List   Diagnosis     Supervision of normal pregnancy, , WHS CNM     Low vitamin D level     Herniated disc, cervical     Research study patient-check to see if patient has occlusive device when admitted for labor     Thrombocytopenia (H)     Encounter for triage in pregnant patient     Labor and delivery, indication for care     Labor and delivery indication for care or intervention      PLAN:  ===========  Ambulation, hydration, position changes, birthing ball/sling and tub options to facilitate labor.  Was unable to give 3rd dose of po miso at 1406 d/t frequency of contractions. Observation as pt was reporting increase in intensity and frequency.  Discussed sve to determine if pitocin should be started at this time (pt prefers no pitocin if labor occurring spontaneously).   Pt uncomfortable and would like pain intervention but does not want to take medications. After review of options, pt desires to try nitrous oxide.   Nitrous oxide to be initiated.  Once set up, consider sve (using nitrous for comfort) and initiation of pitocin or additional po cytotec dosing if ctx spaced.  MD consultant on call Dr. Burgos/ LEANDRA Manuel, EDM

## 2018-04-25 NOTE — H&P
"ADMIT NOTE  =================  39w6d    Susan Locke is a 33 year old female with an Patient's last menstrual period was 2017 (exact date). and Estimated Date of Delivery: 2018 [facility] at 11:59 PM with SROM x 11 hours.     HPI  ================  Patient had rupture of membranes around 1300 this afternoon.  Had been having irregular contractions prior to rupture.  Now duane every 4 minutes for the past few hours.    Contractions- mild  Fetal movement- active  ROM- yes, moderate, clear   Vaginal bleeding- pink  GBS- negative  FOB- is involved, Walter at bedside  Other labor support- Sister Rachel here.      Weight gain- 40lbs  Height- 67\"  BMI- 25  First prenatal visit at 8 weeks, Total visits- 12    PROBLEM LIST  =================  Patient Active Problem List    Diagnosis Date Noted     Encounter for triage in pregnant patient 2018     Priority: Medium     Labor and delivery, indication for care 2018     Priority: Medium     Thrombocytopenia (H) 2018     Priority: Medium     Platelets 147 at 36 wks  recheck in 1 week   18: Platelets 151, recheck on admission       Research study patient-check to see if patient has occlusive device when admitted for labor 2017     Priority: Medium     Low vitamin D level 09/15/2017     Priority: Medium     09/15/17 my c sent         Supervision of normal pregnancy, , [facility] CN 2017     Priority: Medium     ERIC 18 by LMP  10/11/17- MFM US- first trimester screen done_wnl_,    needs AFP only at 15 weeks_declined___  2017 - Normal anatomy scan, placenta anterior, no previa.      18: Birth Preferences - Planning water birth.  Hep C negative, water birth consent signed 18.    3/29/18: GBS and CBC with plts-147  18: Plts [151]     18: BPP and ROMELIA at 41 weeks       Herniated disc, cervical 2012     Priority: Medium       HISTORIES  ============  No Known " "Allergies  Past Medical History:   Diagnosis Date     Arthritis      Dermatitis 2017     H. pylori infection      Hay fever      Rheumatism      History reviewed. No pertinent surgical history..  Family History   Problem Relation Age of Onset     Hypothyroidism Mother      Lung Cancer Father      Stomach Cancer Maternal Grandmother      Thyroid Disease Sister      Social History   Substance Use Topics     Smoking status: Never Smoker     Smokeless tobacco: Never Used     Alcohol use No     Obstetric History       T0      L0     SAB0   TAB0   Ectopic0   Multiple0   Live Births0       # Outcome Date GA Lbr Goyo/2nd Weight Sex Delivery Anes PTL Lv   1 Current                    LABS:   ===========  Prenatal Labs:  Rhogam not indicated   Lab Results   Component Value Date    ABO O 2017    RH Pos 2017    AS Neg 2017    HEPBANG Nonreactive 2017    TREPAB Negative 2018    RUQIGG 38 2017    HGB 12.8 2018     Rubella Immune  Lab Results   Component Value Date    GBS Negative 2018     Other labs:  Results for orders placed or performed during the hospital encounter of 18 (from the past 24 hour(s))   Rupture of Fetal Membranes by ROM Plus   Result Value Ref Range    Rupture of Fetal Membranes by ROM Plus Positive        ROS  =========  Pt denies significant respiratory, cardiovacular, GI, or muscular/skeletalcomplaints.    See RN data base ROS.       PHYSICAL EXAM:  ===============  /75  Temp 97.6  F (36.4  C) (Oral)  Resp 18  Ht 1.702 m (5' 7\")  Wt 90.7 kg (200 lb)  LMP 2017 (Exact Date)  BMI 31.32 kg/m2  General appearance: uncomfortable with contractions  GENERAL APPEARANCE: healthy, alert and no distress  RESP: lungs clear to auscultation - no rales, rhonchi or wheezes  CV: regular rates and rhythm, normal S1 S2, no S3 or S4 and no murmur,and no varicosities  ABDOMEN:  soft, nontender, no epigastric pain  SKIN: no suspicious lesions " or rashes  NEURO: Denies headache, blurred vision, other vision changes  PSYCH: mentation appears normal. and affect normal/bright  Legs: Reflexes normal bilaterally     Abdomen: gravid, vertex fetus per Leopold's, non-tender between contractions.   Cephalic presentation confirmed by BSUS earlier in day  EFW-  8 lbs.   CONTACTIONS: every 4-5 minutes  FETAL HEART TONES: continuous EFM- baseline 130 with moderate variability and positive accelerations. No decelerations.  PELVIC EXAM: Internal os closed/50%/-1/Posterior/average  MIN SCORE: 4  BLOODY SHOW: no   ROM:yes, moderate, clear  FLUID: clear  ROMPLUS: positive earlier when evaluated in triage.    # Pain Assessment:  Current Pain Score 4/24/2018   Patient currently in pain? yes   Pain location Uterine   Pain descriptors Cramping   - Susan is experiencing pain due to early labor. Pain management was discussed with Susan and her family and the plan was created in a collaborative fashion.  Susan's response to the current recommendations: engaged  - patient declines need for pain medication or sleep medication at this time        ASSESSMENT:  ==============  IUP @ 39w6d admitted SROM x 11 hours   NST REACTIVE  Fetal Heart Rate - category one  GBS- negative     PLAN:  ===========  Admit - see IP orders  Discussed option of cervical exam on admission, patient consented.  Discussed unfavorable cervix with early labor contractions.  Patient continues to desires expectant management at this time.  Discussed that if not in spontaneous active labor by 1300 on 4/25 water birth would not be a safe option, but hydrotherapy continues to be an option.    Encouraged patient to rest while in early labor, offered therapeutic rest, patient declines at this time.    LEANDRA Judd CNM

## 2018-04-25 NOTE — PROVIDER NOTIFICATION
04/25/18 1630   Provider Notification   Provider Name/Title Jimy   Method of Notification In Department   Request Evaluate in Person   Notification Reason Patient Request

## 2018-04-25 NOTE — PLAN OF CARE
Problem: Patient Care Overview  Goal: Plan of Care/Patient Progress Review  Outcome: Improving  Patient breathing through contractions.  Contractions are regular every 2-4 minutes apart.  Patient using moving and tub for pain relief.  Plan to hold next dose of miso unitl contractions space out.  Patient and spouse informed of plan of care and understand.

## 2018-04-25 NOTE — PLAN OF CARE
Problem: Patient Care Overview  Goal: Plan of Care/Patient Progress Review  Outcome: No Change  Pt alert, active, and stable. No signs or symptoms of distress. VSS. Pt has been ruptured with clear fluid since 4/24 at 1300. Pt does not wish to have any medication interventions at this time to progress labor or for pain relief. Pt using hot packs for comfort. Pt appeared to get some sleep in the night. See flowsheet for FHR interpretation. Plan is to order some breakfast this AM and take a bath/shower. Will continue to monitor pt closely.

## 2018-04-25 NOTE — PROVIDER NOTIFICATION
04/25/18 1409   Provider Notification   Provider Name/Title Jimy   Method of Notification Electronic Page   Notification Reason Status Update   1400 miso held for every 3 minute contractions

## 2018-04-25 NOTE — PROGRESS NOTES
"Update:     1535- Tried nitrous oxide and then fell asleep. Contractions spaced out, unable to palpate.   Planned to give 3rd dose of misoprostol but patient states that she wants to discuss this first.    1630- Crying and states that the contractions are too painful. Does not want PO miso yet.   Reviewed that as we need labor to progress, another dose of misoprostol or initiation of pitocin is recommended.  Discussed that sve could be performed now to determine miso or pitocin.   Pt crying again and states \"What if I haven't changed, then I will be more anxious and more upset.\"   Reviewed that significant cervical change is not expected at this time as she chose to start with expectant management and then has had only 2 doses of po miso.   Extensive discussion of need for labor to progress and how increase in pain/discomfort is a sign of contractions getting stronger. Reviewed that contractions will get more painful as labor progresses and that I recommend we come up with a plan of care for pain management.     Pt reiterated that she did not want to do any meds but now feels like she wants to hear the options.  In depth review of pain management options. First discussed IM morphine but reviewed that this may not be as effective when she is progressing in labor. Then reviewed IV fentanyl- discussed expectations for pain control with this option and timing of dosing. Next reviewed epidural, including procedure and r/b/a.   Discussed that sve could be performed if this helps her determine her pain management plan.  SVE could be done after one of these pain interventions since she feels significant discomfort with cervical exams.     Emphasized that we should make a plan together on the next steps as we are now not actively augmenting the labor.  Reviewed concerns about increasing risk of infection with prolonged rupture of membranes- now 28 hours.     Patient verbalized understanding of all information and requested " to talk it over with her  and sister.    1715:  Patient ready to discuss plan. Desires cervical exam now.  Category 1 fetal tracing- fhr baseline 125, moderate variability, +accels, no deceleration.  Contractions [id number], palpate mild.     SVE performed with pillow under pt's hips to help with elevation, Nitrous oxide used during exam.  Cervical exam: 3-4/60/-2, post/soft, cephalic. Cervix soft and stretchy.   Patient tolerated exam very well.     Discussed cervical exam, becerra score and ROM. Recommended pitocin at this time. Offered one additional dose of PO miso if pt very opposed to IV start. However, after thorough review, patient states that she would like to get IV placed and start pitocin.   Regarding pain management, pt would first like to try to remain unmedicated and use the nitrous oxide. Plan to use birthing ball and CUB.   States that if she feels she is unable to manage using these techniques, she would first like to try IV fentanyl. Would then re-discuss epidural placement if IV fentanyl not adequate.     Orders placed for IV start and for IV pitocin.  Titrate IV pitocin to adequate contraction pattern.  IV fentanyl prn.  Continue to monitor fluid color, maternal temperature and FHR.  MD on call, Dr. Pandya, available PRN.    LEANDRA Chicas, NGOZI

## 2018-04-26 LAB — HGB BLD-MCNC: 11.2 G/DL (ref 11.7–15.7)

## 2018-04-26 PROCEDURE — 36415 COLL VENOUS BLD VENIPUNCTURE: CPT | Performed by: ADVANCED PRACTICE MIDWIFE

## 2018-04-26 PROCEDURE — 25000132 ZZH RX MED GY IP 250 OP 250 PS 637: Performed by: ADVANCED PRACTICE MIDWIFE

## 2018-04-26 PROCEDURE — 25000125 ZZHC RX 250

## 2018-04-26 PROCEDURE — 85018 HEMOGLOBIN: CPT | Performed by: ADVANCED PRACTICE MIDWIFE

## 2018-04-26 PROCEDURE — 25000128 H RX IP 250 OP 636: Performed by: ADVANCED PRACTICE MIDWIFE

## 2018-04-26 PROCEDURE — 12000030 ZZH R&B OB INTERMEDIATE UMMC

## 2018-04-26 PROCEDURE — 0HQ9XZZ REPAIR PERINEUM SKIN, EXTERNAL APPROACH: ICD-10-PCS | Performed by: ADVANCED PRACTICE MIDWIFE

## 2018-04-26 PROCEDURE — 72200001 ZZH LABOR CARE VAGINAL DELIVERY SINGLE

## 2018-04-26 RX ORDER — ACETAMINOPHEN 325 MG/1
650 TABLET ORAL EVERY 4 HOURS PRN
Status: DISCONTINUED | OUTPATIENT
Start: 2018-04-26 | End: 2018-04-27 | Stop reason: HOSPADM

## 2018-04-26 RX ORDER — MISOPROSTOL 200 UG/1
400 TABLET ORAL
Status: DISCONTINUED | OUTPATIENT
Start: 2018-04-26 | End: 2018-04-27 | Stop reason: HOSPADM

## 2018-04-26 RX ORDER — OXYTOCIN 10 [USP'U]/ML
10 INJECTION, SOLUTION INTRAMUSCULAR; INTRAVENOUS
Status: DISCONTINUED | OUTPATIENT
Start: 2018-04-26 | End: 2018-04-27 | Stop reason: HOSPADM

## 2018-04-26 RX ORDER — AMOXICILLIN 250 MG
2 CAPSULE ORAL 2 TIMES DAILY PRN
Qty: 120 TABLET | Refills: 1 | Status: SHIPPED | OUTPATIENT
Start: 2018-04-26 | End: 2019-08-23

## 2018-04-26 RX ORDER — AMOXICILLIN 250 MG
2 CAPSULE ORAL 2 TIMES DAILY
Status: DISCONTINUED | OUTPATIENT
Start: 2018-04-26 | End: 2018-04-27 | Stop reason: HOSPADM

## 2018-04-26 RX ORDER — HYDROCORTISONE 2.5 %
CREAM (GRAM) TOPICAL 3 TIMES DAILY PRN
Status: DISCONTINUED | OUTPATIENT
Start: 2018-04-26 | End: 2018-04-27 | Stop reason: HOSPADM

## 2018-04-26 RX ORDER — LIDOCAINE HYDROCHLORIDE 10 MG/ML
INJECTION, SOLUTION EPIDURAL; INFILTRATION; INTRACAUDAL; PERINEURAL
Status: COMPLETED
Start: 2018-04-26 | End: 2018-04-26

## 2018-04-26 RX ORDER — MISOPROSTOL 200 UG/1
TABLET ORAL
Status: DISCONTINUED
Start: 2018-04-26 | End: 2018-04-26 | Stop reason: WASHOUT

## 2018-04-26 RX ORDER — OXYTOCIN/0.9 % SODIUM CHLORIDE 30/500 ML
340 PLASTIC BAG, INJECTION (ML) INTRAVENOUS CONTINUOUS PRN
Status: DISCONTINUED | OUTPATIENT
Start: 2018-04-26 | End: 2018-04-27 | Stop reason: HOSPADM

## 2018-04-26 RX ORDER — LANOLIN 100 %
OINTMENT (GRAM) TOPICAL
Status: DISCONTINUED | OUTPATIENT
Start: 2018-04-26 | End: 2018-04-27 | Stop reason: HOSPADM

## 2018-04-26 RX ORDER — BISACODYL 10 MG
10 SUPPOSITORY, RECTAL RECTAL DAILY PRN
Status: DISCONTINUED | OUTPATIENT
Start: 2018-04-28 | End: 2018-04-27 | Stop reason: HOSPADM

## 2018-04-26 RX ORDER — IBUPROFEN 800 MG/1
800 TABLET, FILM COATED ORAL EVERY 6 HOURS PRN
Status: DISCONTINUED | OUTPATIENT
Start: 2018-04-26 | End: 2018-04-27 | Stop reason: HOSPADM

## 2018-04-26 RX ORDER — IBUPROFEN 800 MG/1
800 TABLET, FILM COATED ORAL EVERY 8 HOURS PRN
Qty: 60 TABLET | Refills: 1 | Status: SHIPPED | OUTPATIENT
Start: 2018-04-26 | End: 2019-08-23

## 2018-04-26 RX ORDER — OXYTOCIN/0.9 % SODIUM CHLORIDE 30/500 ML
100 PLASTIC BAG, INJECTION (ML) INTRAVENOUS CONTINUOUS
Status: DISCONTINUED | OUTPATIENT
Start: 2018-04-26 | End: 2018-04-27 | Stop reason: HOSPADM

## 2018-04-26 RX ORDER — AMOXICILLIN 250 MG
1 CAPSULE ORAL 2 TIMES DAILY
Status: DISCONTINUED | OUTPATIENT
Start: 2018-04-26 | End: 2018-04-27 | Stop reason: HOSPADM

## 2018-04-26 RX ADMIN — SENNOSIDES AND DOCUSATE SODIUM 2 TABLET: 8.6; 5 TABLET ORAL at 20:05

## 2018-04-26 RX ADMIN — LIDOCAINE HYDROCHLORIDE 20 ML: 10 INJECTION, SOLUTION EPIDURAL; INFILTRATION; INTRACAUDAL; PERINEURAL at 01:36

## 2018-04-26 RX ADMIN — Medication 7 ML: at 00:39

## 2018-04-26 RX ADMIN — IBUPROFEN 800 MG: 800 TABLET, FILM COATED ORAL at 02:32

## 2018-04-26 RX ADMIN — IBUPROFEN 800 MG: 800 TABLET, FILM COATED ORAL at 10:41

## 2018-04-26 RX ADMIN — SENNOSIDES AND DOCUSATE SODIUM 1 TABLET: 8.6; 5 TABLET ORAL at 10:40

## 2018-04-26 RX ADMIN — ACETAMINOPHEN 650 MG: 325 TABLET ORAL at 20:04

## 2018-04-26 RX ADMIN — SODIUM CHLORIDE, POTASSIUM CHLORIDE, SODIUM LACTATE AND CALCIUM CHLORIDE: 600; 310; 30; 20 INJECTION, SOLUTION INTRAVENOUS at 00:27

## 2018-04-26 RX ADMIN — ACETAMINOPHEN 650 MG: 325 TABLET ORAL at 14:02

## 2018-04-26 RX ADMIN — IBUPROFEN 800 MG: 800 TABLET, FILM COATED ORAL at 17:00

## 2018-04-26 NOTE — PROGRESS NOTES
"Labor Progress Note    S: Susan is laboring in the chair and utilizing the nitrous oxide for pain relief. She reports minimal improvement in pain with the nitrous. Getting very tired and expresses that even if she were 10 cm, at this point she would want an epidural in order to rest prior to pushing.  and sister remain at bedside and supportive. Anesthesia resident at bedside to answer questions about the epidural.      O:  Blood pressure 129/74, temperature 98.2  F (36.8  C), temperature source Oral, resp. rate 16, height 1.702 m (5' 7\"), weight 90.7 kg (200 lb), last menstrual period 2017, not currently breastfeeding.  General appearance: uncomfortable .  Contractions: Every 2-3 minutes. 70-90 seconds duration.  Palpate: moderate.  Soft resting tone.   FHT: Baseline 120 with moderate variability. Accelerations present. No decelerations present.  ROM: clear fluid. Membranes have been ruptured for 33 hours.  Pelvic exam: Deferred    Pitocin- 2 mu/min.,  Antibiotics- none    Pain Assessment:  Current Pain Score    Patient currently in pain? yes   Pain location abdomen   Pain descriptors contractions   - Susan is experiencing pain due to labor/induction. Pain management was discussed with pt  and partner and the plan was created in a collaborative fashion. Susan's response to the current recommendations: Engaged  -Lengthy discussion with patient about pain medication options including continued nitrous use, IV fentanyl and epidural. After patient and 's questions were answered, patient desires an epidural. Anesthesia is aware.     A:  33 year old  with IUP @ 40w0d early labor and IOL for SROM no labor  Prolonged rupture of membranes  Fetal Heart rate tracing Category one  GBS- negative  Patient Active Problem List   Diagnosis     Supervision of normal pregnancy, , [facility] CNM     Low vitamin D level     Herniated disc, cervical     Research study patient-check to see if patient has occlusive " device when admitted for labor     Thrombocytopenia (H)     Encounter for triage in pregnant patient     Labor and delivery, indication for care     Labor and delivery indication for care or intervention     P:  -Anesthesia aware of patient's request for an epidural  -Comfort measures prn   -Anticipate   -MD consultant Pandya on call / available prn  -Continue labor induction with Pitocin   -Reassess patient in 2-3 hours, sooner prn. Will consider SVE after patient has an opportunity to rest following epidural placement.     Misty Ferris, EDM, APRN

## 2018-04-26 NOTE — ANESTHESIA PREPROCEDURE EVALUATION
Anesthesia Evaluation       history and physical reviewed .      No history of anesthetic complications          ROS/MED HX    ENT/Pulmonary:  - neg pulmonary ROS     Neurologic:  - neg neurologic ROS     Cardiovascular:  - neg cardiovascular ROS       METS/Exercise Tolerance:     Hematologic:         Musculoskeletal:         GI/Hepatic:  - neg GI/hepatic ROS       Renal/Genitourinary:         Endo:         Psychiatric:         Infectious Disease:         Malignancy:         Other:                   Procedure: * No procedures listed *    HPI: Susan Locke is a 33 year old female requests labor epidural.    PMHx/PSHx:  Past Medical History:   Diagnosis Date     Arthritis      Dermatitis 6/12/2017     H. pylori infection      Hay fever      Rheumatism        History reviewed. No pertinent surgical history.      No current facility-administered medications on file prior to encounter.   Current Outpatient Prescriptions on File Prior to Encounter:  Prenatal Vit-Fe Fumarate-FA (PRENATAL VITAMIN PO)    pyridOXINE (VITAMIN  B-6) 25 MG tablet Take 25 mg by mouth daily   VITAMIN D, CHOLECALCIFEROL, PO Take 5,000 Units by mouth daily   Psyllium Husk POWD        Social Hx:   Social History   Substance Use Topics     Smoking status: Never Smoker     Smokeless tobacco: Never Used     Alcohol use No       Allergies: No Known Allergies      NPO Status: Per ASA Guidelines    Labs:    Blood Bank:  Lab Results   Component Value Date    ABO O 04/25/2018    RH Pos 04/25/2018    AS Neg 04/25/2018     BMP:  Recent Labs   Lab Test  02/22/18   0947   GLC  80     CBC:   Recent Labs   Lab Test  04/25/18   0047   WBC  11.5*   RBC  3.89   HGB  12.8   HCT  37.7   MCV  97   MCH  32.9   MCHC  34.0   RDW  13.1   PLT  157     Coags:  No results for input(s): INR, PTT, FIBR in the last 72934 hours.      Physical Exam  Normal systems: cardiovascular, pulmonary and dental    Airway   Mallampati: II  TM distance: > 3 FB  Neck ROM: full  Mouth  opening: > 3 cm    Dental     Cardiovascular       Pulmonary     Other findings: Patient has platelet count 157 yesterday.      neg OB ROS                 Anesthesia Plan      History & Physical Review      ASA Status:  2 .  OB Epidural Asa: 2   NPO Status:  > 8 hours    Plan for Epidural          Postoperative Care      Consents  Anesthetic plan, risks, benefits and alternatives discussed with:  Patient..        Rebekah Cabrera MD  CA-3/PGY-4  4/25/2018  11:09 PM

## 2018-04-26 NOTE — PLAN OF CARE
Mother and baby transferred to postpartum unit at 0405 via wheelchair with  in arms after completion of immediate recovery period. Patient oriented to room and report given to Desire LIU who assumes patient care. Mother and baby bonding well and in satisfactory condition upon transfer.

## 2018-04-26 NOTE — PROGRESS NOTES
Postpartum Day of Delivery Note    Patient states that she is feeling well after her delivery early this morning.  Reports that her pain is well managed with NSAID's.  Voiding without difficulty.  Lochia as expected 12hrs s/p .  Working on breastfeeding infant.  Patient states that she would need stool softeners, Ibuprofen and Tylenol for discharge, which were ordered today. Plan Hgb in AM and possible discharge tomorrow.  LEANDRA Judd CNM

## 2018-04-26 NOTE — PROGRESS NOTES
Patient arrived to [facility] unit via wheelchair at 0430,with belongings, accompanied by spouse/ significant other, with infant in arms. Received report from NOE Sigala  and checked bands. Unit and room orientation completd. Call light given; no concerns present at this time. Continue with plan of care.

## 2018-04-26 NOTE — PLAN OF CARE
Problem: Patient Care Overview  Goal: Plan of Care/Patient Progress Review  Outcome: Improving  VSS. Afebrile. Up ad kristen. Voiding in bathroom. Pt states has burning with voiding but santi bottle helping out. Breast feeding with total assist. Nipples feel sore. Encouraged to put colostrum and/or lanolin on it. FOB and pt's sister is here helping out. Tolerating regular diet without issues. C/o pain and medicated with relief. Will continue to monitor.

## 2018-04-26 NOTE — PLAN OF CARE
Problem: Labor (Cervical Ripen, Induct, Augment) (Adult,Obstetrics,Pediatric)  Goal: Signs and Symptoms of Listed Potential Problems Will be Absent, Minimized or Managed (Labor)  Signs and symptoms of listed potential problems will be absent, minimized or managed by discharge/transition of care (reference Labor (Cervical Ripen, Induct, Augment) (Adult,Obstetrics,Pediatric) CPG).   Outcome: Improving  Pt laboring, leaking clear fluid, bloody show present. Epidural placed by Dr. Cabrera. Following epidural placement, pt c/o no pain relief on left side, repositioned and PCEA bolus given, still no relief - Dr. Cabrera at bedside, additional bolus given. Pt c/o rectal pressure at this time, asking for laxative - reassured that this was likely baby, CNM Misty Ferris CNM called to bedside, SVE 10/100/+2. FHR normal baseline, moderate variability, accels present, no decels. Natalie q 1.5-4minutes with pitocin at 2mu/hr.    Vaginal Delivery Note   of viable Female with Misty Ferris CNM in attendance.  Nursery RN Paula RUIZ present.  Infant with spontaneous cry, to mother's abdomen, dried and stimulated.  APGAR at 1 minute:  9 and APGAR at 5 minutes:  9.  Placenta delivered with out complication, pitocin bolused following delivery of , 1st degree laceration, with repair, santi cares provided.  Mother and baby in stable condition.

## 2018-04-26 NOTE — PLAN OF CARE
Problem: Patient Care Overview  Goal: Plan of Care/Patient Progress Review  Outcome: Improving  VSS. Bonding well with baby. Breastfeeding with assist from staff to achieve and maintain comfortable latch. Pain controlled with ibuprofen. Ambulating assist stand by. Voided after delivery per L&D RN. Dad and aunt at bedside for support. Continue with plan of care.

## 2018-04-26 NOTE — PROGRESS NOTES
"Labor Progress Note    S: Susan is laboring on the cub, using nitrous and has her supportive  and sister at the bedside. Appears tired but coping well.    O:  Blood pressure 127/75, temperature 98.2  F (36.8  C), temperature source Oral, resp. rate 16, height 1.702 m (5' 7\"), weight 90.7 kg (200 lb), last menstrual period 2017, not currently breastfeeding.  General appearance: uncomfortable with contractions.  Contractions: Every 3-5 minutes. 60-90 seconds duration.  Palpate: mild.  Soft resting tone.   FHT: Baseline 130 with moderate variability. Accelerations present. No decelerations present.  ROM: clear fluid. Membranes have been ruptured for 30 hours.  Pelvic exam: Deferred    Pitocin- 1 mu/min.,  Antibiotics- none    Pain Assessment:  Current Pain Score    Patient currently in pain? yes   Pain location abdomen   Pain descriptors contraction   - Susan is experiencing pain due to labor/induction. Pain management was discussed with pt and partner and the plan was created in a collaborative fashion. Susan's response to the current recommendations: Engaged  -Pt desires continued nitrous oxide use at this time.     A:  33 year old  with IUP @ 40w0d IOL SROM no labor and early labor   Fetal Heart rate tracing Category one  GBS- negative  Patient Active Problem List   Diagnosis     Supervision of normal pregnancy, , [facility] CNM     Low vitamin D level     Herniated disc, cervical     Research study patient-check to see if patient has occlusive device when admitted for labor     Thrombocytopenia (H)     Encounter for triage in pregnant patient     Labor and delivery, indication for care     Labor and delivery indication for care or intervention     P:  -Continue labor induction with Pitocin per protocol  -Repeat CBC with platelets overnight  -Movement and frequent position changes.  -PO fluid and nutrition as tolerated.   -Continuous EFM for prolonged ROM  -Continue to monitor closely for maternal and " fetal wellbeing.  -Anticipate   -MD consultant Mumtaz on call / available prn  -Reassess patient in 2-3 hours, sooner prn      Misty Ferris CNM, APRN

## 2018-04-26 NOTE — L&D DELIVERY NOTE
DELIVERY NOTE:  Susan Locke is a 33 year old   at  40w1d presented to labor floor following 11 hours of ruptured membranes with no active labor.  Time of rupture:1300 on 18. She desired expectant management until 10 am on 18. Pt received 2 doses of PO misoprostal and pitocin for labor induction. She used nitrous oxide for pain relief and then received an epidural. Progressed to complete and +2.  Pushed effectively with minimal coaching. FHR with early decels with pushing effort, moderate variability throughout. Head delivered OA and restituted to OLIVE . Tight nuchal cord x1. Shoulders easily delivered under maternal effort.  Somersaulted through cord. Live female  delivered at 0117 over an intact perineum under epidural anesthesia.  Spontaneous breath, vigorous cry, well flexed, HR>100. Infant directly to maternal abdomen, skin to skin. Delayed cord clamping for 5 minutes then clamped x2 and cut by FOB.   20 units of pitocin infusing after baby.  Cord blood obtained for typing.  Intact placenta spontaneously delivered via valdes at 0124.   3 vessel cord. Fundus firm @ u-2 after vigorous massage.   Vagina, perineum, and rectum inspected, b/l sublabial lacerations repaired with interrupted stiches with a 4-0 vicryl.  Hemostasis noted. The small vaginal laceration was hemostatic and did not require repair. Mother and infant stable; continued skin to skin. Good family bonding observed.     An occlusive device was NOT utilized during second stage, as there was not a device available for the patient on L&D.     Apgars 9/9.  Weight: pending       Placenta to pathology: no  Cord gases not indicated    Delivery Note:   IUP at 40 weeks 1 day gestation delivered on 2018.     delivery of a viable Female infant.  Weight : pending  Apgars of 9 at 1 minute and 9 at 5 minutes.  Labor was induced.  Medications administered  in labor: cytotc, pitocin Pain Rx Epidural and Nitrous Oxide;  Antibiotics No  Perineum: b/l sublabial splits repaired, shallow vaginal laceration not repaired.  Placenta-mechanism: spontaneous, intact,  with a 3 vessel cord. IV oxytocin was given After delivery of baby before delivery of placenta  Quantitative Blood Loss was 264 mL  Complications of labor and delivery: ROM > 18 hours  Anticipated Discharge Date: 18  Birth attendants: LEANDRA Henning CNM    Delivery Summary - No Garay  Delivery Summary    Susan Locke MRN# [id number]   Age: 33 year old YOB: 1985     Labor Event Times:    Labor Onset Date       Labor Onset Time    Dilation Complete Date    Dilation Complete Time       Start Pushing Date        Start Pushing Time            Labor Length:    1st Stage (hrs/min)     2nd Stage (hrs/min)     3rd Stage (hrs/min)         Labor Events:     Labor No   Rupture Date     Rupture Time     Rupture Type Spontaneous rupture of membranes prior to induction   Fluid Color     Labor Type     Induction    Induction Indication         Augmentation    Labor Complications     Additional Complications     Management of Labor        Antibiotics     IV Antibiotic Given     Additional Management     Fetal Status Prior to  Delivery     Fetal Status Comments         Cervical Ripening:    Date     Time     Type         Delivery:    Episiotomy None   Local Anesthetic        Lacerations None   Sponge Count Correct       Needle Count Correct     Final Count by:    Sutures     Blood loss (ml)    Packing Intentionally Left In     Number     Comments           Information for the patient's :  Khurram Locke [[id number]]       Delivery  2018 1:17 AM by  Vaginal, Spontaneous Delivery  Sex:  female Gestational Age: 40w1d  Delivery Clinician:     Living?:            APGARS  One minute Five minutes Ten minutes   Skin color:            Heart rate:            Grimace:            Muscle tone:            Breathing:            Totals: 9  9          Presentation/position:           Resuscitation and Interventions: Method:  None  Oxygen Type:     Intubation Time:   # of Attempts:     ETT Size:        Tracheal Suction:     Tracheal returns:      [place] Care at Delivery:   of female. Spontaneous cry. To mother's abdomen; dried and stimulated.        Cord information:     Disposition of cord blood:      Blood gases sent?    Complications:     Placenta: Delivered:           appearance.  Comments:  .  Disposition: Hospital disposal   Measurements:  Weight:    Height:    Head circumference:    Chest circumference:     Temperature:     Other providers:       Additional  information:  Forceps:    Verbal Informed Consent Obtained:       Alternative Labor Strategies Discussed:     Emergency Resources Available:       Type:       Accrued Pulling Time:       # of Pulls:      Position:     Fetal Station:       Indications:      Other Indications:     Operative Vaginal Delivery Brief Note Forceps:        Vacuum:    Verbal Informed Consent Obtained:     Alternative Labor Strategies Discussed:     Emergency Resources Available:     Type:      Accrued Pulling Time:       # of Pop-Offs:       # of Pulls:       Position:     Fetal Station:      Indications for Vacuum:       Other Indications:    Operative Vaginal Delivery Brief Note Vacuum:        Shoulder Dystocia Shoulder Dystocia    Fetal Tracing Prior to Delivery:  Category 1   Shoulder dystocia present?:  No                                            Breech:       : Type:     Indications for Primary:     Indications for Secondary:     Other Indications:        Observed anomalies     Output in Delivery Room: Stool                              LEANDRA Henning CNM

## 2018-04-26 NOTE — ANESTHESIA PROCEDURE NOTES
Epidural Procedure Note    Staff:     Anesthesiologist:  FALGUNI PURVIS    Resident/CRNA:  OMERO JANE    Procedure performed by resident/CRNA in the presence of a teaching physician    Location: OB     Procedure start time:  4/25/2018 11:25 PM     Procedure end time:  4/25/2018 11:43 PM   Pre-procedure checklist:   patient identified, IV checked, site marked, risks and benefits discussed, informed consent, monitors and equipment checked, pre-op evaluation, at physician/surgeon's request and post-op pain management      Correct Patient: Yes      Correct Position: Yes      Correct Site: Yes      Correct Procedure: Yes      Correct Laterality:  Yes    Site Marked:  Yes  Procedure:     Procedure:  Epidural catheter    ASA:  2    Position:  Sitting    Sterile Prep: chloraprep      Insertion site:  L3-4    Local skin infiltration:  1% lidocaine    amount (mL):  5    Approach:  Midline    Needle gauge (G):  17    Needle Length (in):  3.5    Block Needle Type:  Touhy    Injection Technique:  LORT saline    ANJEL at (cm):  5.5    Attempts:  1    Redirects:  1    Catheter gauge (G):  19    Catheter threaded easily: Yes      Threaded to cm at skin:  8.5    Threaded in epidural space (cm):  3    Paresthesias:  No    Aspiration negative for Heme or CSF: Yes       Local anesthetic:  Lidocaine 1.5% w/ 1:200,000 epinephrine    Test dose time:  23:37    Test dose negative for signs of intravascular, subdural or intrathecal injection: Yes

## 2018-04-27 VITALS
BODY MASS INDEX: 31.39 KG/M2 | OXYGEN SATURATION: 99 % | WEIGHT: 200 LBS | HEART RATE: 70 BPM | HEIGHT: 67 IN | DIASTOLIC BLOOD PRESSURE: 84 MMHG | TEMPERATURE: 97.7 F | SYSTOLIC BLOOD PRESSURE: 116 MMHG | RESPIRATION RATE: 18 BRPM

## 2018-04-27 PROBLEM — Z36.89 ENCOUNTER FOR TRIAGE IN PREGNANT PATIENT: Status: RESOLVED | Noted: 2018-04-24 | Resolved: 2018-04-27

## 2018-04-27 PROCEDURE — 25000132 ZZH RX MED GY IP 250 OP 250 PS 637: Performed by: ADVANCED PRACTICE MIDWIFE

## 2018-04-27 RX ADMIN — IBUPROFEN 800 MG: 800 TABLET, FILM COATED ORAL at 09:11

## 2018-04-27 RX ADMIN — IBUPROFEN 800 MG: 800 TABLET, FILM COATED ORAL at 00:18

## 2018-04-27 RX ADMIN — SENNOSIDES AND DOCUSATE SODIUM 2 TABLET: 8.6; 5 TABLET ORAL at 09:11

## 2018-04-27 NOTE — PLAN OF CARE
Problem: Patient Care Overview  Goal: Plan of Care/Patient Progress Review  Outcome: Improving  VSS. Breastfeeding with occasional assist from staff to achieve and maintain comfortable latch. Hand expressing after most feedings and getting 2-3ml colostrum to give back to baby via cup. Bilateral sore nipples; slight crack on left nipple. Using lanolin, colostrum and hydrogels. Latch education reviewed.     Pain controlled with tylenol and ibuprofen. Ambulating independently. Voiding without difficulty. Dad and sister at bedside for support. Continue with plan of care.

## 2018-04-27 NOTE — DISCHARGE SUMMARY
Postpartum Note and Discharge Summary  Postpartum Day #1  SIGNIFICANT PROBLEMS:  Patient Active Problem List    Diagnosis Date Noted      (normal spontaneous vaginal delivery) 2018     Priority: Medium     Labor and delivery indication for care or intervention 2018     Priority: Medium     Encounter for triage in pregnant patient 2018     Priority: Medium     Labor and delivery, indication for care 2018     Priority: Medium     Thrombocytopenia (H) 2018     Priority: Medium     Platelets 147 at 36 wks  recheck in 1 week   18: Platelets 151, recheck on admission       Research study patient-check to see if patient has occlusive device when admitted for labor 2017     Priority: Medium     Low vitamin D level 09/15/2017     Priority: Medium     09/15/17 my c sent         Supervision of normal pregnancy, , [facility] CN 2017     Priority: Medium     ERIC 18 by LMP  10/11/17- MFM US- first trimester screen done_wnl_,    needs AFP only at 15 weeks_declined___  2017 - Normal anatomy scan, placenta anterior, no previa.      18: Birth Preferences - Planning water birth.  Hep C negative, water birth consent signed 18.    3/29/18: GBS and CBC with plts-147  18: Plts [151]     18: BPP and ROMELIA at 41 weeks       Herniated disc, cervical 2012     Priority: Medium     ADMISSION DIAGNOSIS:  Labor and Delivery  DISCHARGE DIAGNOSIS:    HOSPITAL COURSE:  40w1d  Susan Locke is a 33 year old female     [facility] 11:09 PM ruptured with no labor.     Delivery note:   Susan Locke is a 33 year old   at  40w1d presented to labor floor following 11 hours of ruptured membranes with no active labor.  Time of rupture:1300 on 18. She desired expectant management until 10 am on 18. Pt received 2 doses of PO misoprostal and pitocin for labor induction. She used nitrous oxide for pain relief and  "then received an epidural. Progressed to complete and +2.  Pushed effectively with minimal coaching. FHR with early decels with pushing effort, moderate variability throughout. Head delivered OA and restituted to OLIVE . Tight nuchal cord x1. Shoulders easily delivered under maternal effort.  Somersaulted through cord. Live female  delivered at 0117 over an intact perineum under epidural anesthesia.  Spontaneous breath, vigorous cry, well flexed, HR>100. Infant directly to maternal abdomen, skin to skin. Delayed cord clamping for 5 minutes then clamped x2 and cut by FOB.   20 units of pitocin infusing after baby.  Cord blood obtained for typing.  Intact placenta spontaneously delivered via valdes at 0124.   3 vessel cord. Fundus firm @ u-2 after vigorous massage.   Vagina, perineum, and rectum inspected, b/l sublabial lacerations repaired with interrupted stiches with a 4-0 vicryl.  Hemostasis noted. The small vaginal laceration was hemostatic and did not require repair. Mother and infant stable; continued skin to skin. Good family bonding observed.    IUP at 40 weeks 1 day gestation delivered on 2018.     delivery of a viable Female infant.  Weight : pending  Apgars of 9 at 1 minute and 9 at 5 minutes.  Labor was induced.  Medications administered  in labor: cytotc, pitocin Pain Rx Epidural and Nitrous Oxide; Antibiotics No  Perineum: b/l sublabial splits repaired, shallow vaginal laceration not repaired.  Placenta-mechanism: spontaneous, intact,  with a 3 vessel cord. IV oxytocin was given After delivery of baby before delivery of placenta  Quantitative Blood Loss was 264 mL  Complications of labor and delivery: ROM > 18 hours  Anticipated Discharge Date: 18  Birth attendants: LEANDRA Henning CNM    INTERVAL HISTORY:  /83  Pulse 67  Temp 97.8  F (36.6  C) (Oral)  Resp 16  Ht 1.702 m (5' 7\")  Wt 90.7 kg (200 lb)  LMP 2017 (Exact Date)  SpO2 99%  Breastfeeding? " Unknown  BMI 31.32 kg/m2  Pt stable, baby is rooming in.   Breast feeding status: initiated, having issues with shallow latch and moderate pain with nursing. Seen by lactation consultant.  Complications since 2 hours post delivery: None  Patient is tolerating activity well, Voiding without difficulty, no BM yet. Cramping is minimal and is relieved by ibuprofen, lochia is decreasing and patient denies clots.  Perineal pain is is minimal.  The perineum laceration is well approximated, no edema or redness.    Physical Exam:   Breasts: soft, nontender, lactating, R nipple mild cracking/bruising  Abdomen: soft, nontender, fundus 1 fb u/u   Lochia: small amount, rubra, no clots or odor  Perineum: well-approximated, no edema  Extremities: trace edema    Postpartum hemoglobin   Hemoglobin   Date Value Ref Range Status   2018 11.2 (L) 11.7 - 15.7 g/dL Final     Prenatal Labs:   Lab Results   Component Value Date    ABO O 2018    RH Pos 2018    AS Neg 2018    HEPBANG Nonreactive 2017    TREPAB Negative 2018    RUQIGG 38 2017     Rubella: immune  History of depression: denies. Postpartum depression warning signs reviewed.    ASSESSMENT/PLAN:  Discussed lactation resources and encouraged appointment with LC earlier after discharge to continue with breastfeeding support.  Normal postpartum exam, stable postpartum day #1  Complications:breastfeeding difficulties  Plan d/c home today. Home Visit Ordered- Yes: new breastfeeding mother  RTC 6 weeks or earlier prn  Postpartum warning s/s reviewed, including bleeding/clots, fever, mastitis, or depression  Continue prenatal vitamins  Birthcontrol planned: condoms, discussed ideal pregnancy spacing    PROCEDURES:      Current Discharge Medication List      START taking these medications    Details   acetaminophen 500 MG CAPS Take 2 capsules by mouth every 6 hours as needed  Qty: 60 capsule, Refills: 1    Associated Diagnoses:  (normal  spontaneous vaginal delivery)      ibuprofen (ADVIL/MOTRIN) 800 MG tablet Take 1 tablet (800 mg) by mouth every 8 hours as needed for moderate pain  Qty: 60 tablet, Refills: 1    Associated Diagnoses:  (normal spontaneous vaginal delivery)      senna-docusate (SENOKOT-S;PERICOLACE) 8.6-50 MG per tablet Take 2 tablets by mouth 2 times daily as needed for constipation  Qty: 120 tablet, Refills: 1    Associated Diagnoses:  (normal spontaneous vaginal delivery)         CONTINUE these medications which have NOT CHANGED    Details   Prenatal Vit-Fe Fumarate-FA (PRENATAL VITAMIN PO)       pyridOXINE (VITAMIN  B-6) 25 MG tablet Take 25 mg by mouth daily      VITAMIN D, CHOLECALCIFEROL, PO Take 5,000 Units by mouth daily      Psyllium LEANDRA MurilloM

## 2018-04-27 NOTE — LACTATION NOTE
This note was copied from a baby's chart.  Consult for mom with sore nipples, pain despite deep looking latch and 7.7% weight loss @ 24 hours.  Breast exam of mom WNL, nipples everted and red bilaterally. Mom reports bilateral breast growth during pregnancy. Oral exam of infant WNL, good extension of tongue when suck on finger.     Latched at time of LC visit, mom report pain and much of lower areola visible outside mouth. Briefly described anatomy of breast and infant mouth, importance of latch for comfort and milk transfer, ways to get and maintain deeper latch. With permission, show mom how to break seal, pointed out pinched tip of nipple and demo how to get deeper latch.  Infant fussy, crying right when pulled off and difficult to get her to settle back on with deeper latch, suck a few times and pull away crying.  Sucking blisters/scabs on left wrist and hand, point out to parents her love for sucking with very little in her mouth and how to teach her to tolerate more.  Help with calming and latching techniques, Idania did latch on and fed well with encouragement. Taught breast massage and compressions to keep her sucking, mom able to see results. Reported more comfortable latch, but difficulty with return demo on getting deep. Left number for mom to call with latches today, will assist prn until more comfortable on her own. Recommended laid back positioning also to help with deep latch and easier independence.     They plan follow up at [facility], encourage to call them for LC recommendations first. Reviewed breastfeeding log and brought up to date, breastfeeding resources and who, when to call for support. Encourage hand expressing after each feeding until milk is in, call if not seen change by day 5 or if signs not getting enough. Demo hand expressing for mom per her request this time, she did not return demo. Please assure she is comfortable doing this on her own prior to d/c. (Susan did hand expression  on return visit - good technique but not able to get any out @ that time).

## 2018-04-28 NOTE — PLAN OF CARE
Problem: Patient Care Overview  Goal: Plan of Care/Patient Progress Review  Outcome: Adequate for Discharge Date Met: 04/27/18  Patient discharged to home with  and baby. All d/c instructions reviewed and copy given to patient. Plan for f/u in clinic understood.

## 2018-06-07 ENCOUNTER — OFFICE VISIT (OUTPATIENT)
Dept: OBGYN | Facility: CLINIC | Age: 33
End: 2018-06-07
Attending: ADVANCED PRACTICE MIDWIFE
Payer: COMMERCIAL

## 2018-06-07 VITALS
SYSTOLIC BLOOD PRESSURE: 102 MMHG | HEART RATE: 73 BPM | WEIGHT: 178 LBS | HEIGHT: 67 IN | BODY MASS INDEX: 27.94 KG/M2 | DIASTOLIC BLOOD PRESSURE: 70 MMHG

## 2018-06-07 DIAGNOSIS — K64.9 HEMORRHOIDS, UNSPECIFIED HEMORRHOID TYPE: ICD-10-CM

## 2018-06-07 PROCEDURE — G0463 HOSPITAL OUTPT CLINIC VISIT: HCPCS | Mod: ZF

## 2018-06-07 RX ORDER — HYDROCORTISONE ACETATE 25 MG/1
25 SUPPOSITORY RECTAL 2 TIMES DAILY
Qty: 28 SUPPOSITORY | Refills: 0 | Status: SHIPPED | OUTPATIENT
Start: 2018-06-07 | End: 2019-08-23

## 2018-06-07 NOTE — PROGRESS NOTES
"  Nursing Notes:   Angelique Espinoza  2018  8:38 AM  Signed  SUBJECTIVE:   Susan Locke is here for her 6-week postpartum checkup.     PHQ-9 score:   Hx of Abuse:  No    Delivery Date: 18.    Delivering provider: Barrington MELVIN.    Type of delivery:  .  Perineum:  Labial tear repair.     Delivery complications: None  Infant gender:  girl, weight 7 pounds 3.3 oz.  Feeding Method:  .  Complications reported with feeding:  none, infant thriving .    Bleeding:  Heavy for the first few week then got lighter.  Duration:  4 weeks.  Menses resumed:  No  Bowel/Urinary problems:  Yes     Contraception Planned:  None -- is she planning pregnancy? No  She  has not had intercourse since delivery..       ============================================================Susan Locke is a 33 year old  s/p  on 18. Bilateral sub-labial lacerations repaired, QBL 264ml. Baby girl. Apgars 9, 9. Birth weight 7lb 3.3oz.     Patient states that she is doing well. Accompanied today by  and baby girl.  She is no longer having vaginal bleeding. Feels repair has healed well. No vaginal irritation or discomfort. Normal voiding. Had some constipation which is improving- used stool softeners and witch hazel pads when experiencing discomfort.    Baby girl is healthy and gaining weight. Breastfeeding well [id number]. Now over 10lbs.  Smiles and described baby as a \"little vampire.\" Has upcoming routine peds appointment.    Reports mood is stable. Both are tired but coping well. Feels well supported by spouse, family and friends. Her sister moved her last week and will be able to help.  currently on a 2 week break from graduate school. She will be returning to work mid July but will be working from home for a period of time.     Desires condoms for contraception. Has not had intercourse since delivery.     Last pap 17 NIL, negative HPV    ROS: 10 point ROS neg other than the symptoms noted above in the " "HPI.   CONSTITUTIONAL: negative  RESPIRATORY: negative  CARDIOVASCULAR: negative  GI: negative  : negative  MUSCULO-SKELETAL: negative  SKIN: negative  PSYCHIATRIC: negative    EXAM:  /70 (BP Location: Left arm, Patient Position: Chair)  Pulse 73  Ht 1.702 m (5' 7\")  Wt 80.7 kg (178 lb)  LMP 2017 (Exact Date)  BMI 27.88 kg/m2    General: healthy, alert and no distress  Psych: negative for sleep disturbance, anxiety, nervous breakdown, depression, thoughts of self-harm, thoughts of hurting someone else, agitation, hallucinations, sexual difficulties, marital problems, abusive relationship, excessive alcohol consumption and illegal drug usage  PHQ9=3 GAD7= 3    Breasts:  Lactating, Nipples intact with no lesions, Non-tender and No S/S of yeast or mastitis  Abdomen: Benign, Soft, flat, non-tender, No masses, organomegaly and Diastasis less than 1-2 FB  Incision:  None   Vulva:  Normal genitalia and Bartholin's, Urethra, Chical's normal  Vagina:  normal with good muscle tone, without discharge and repair well healed  Cervix:  pink, moist, closed, without lesion or CMT.    Uterus:  fully involuted and non-tender    Adnexa:  Within normal limits and No masses, nodularity, tenderness  Recto-vaginal:   anus normal, no ext or int hemorrhoid  noted    ASSESSMENT:   Encounter Diagnosis   Name Primary?     Postpartum care and examination of lactating mother Yes      Normal postpartum exam after   Pregnancy was complicated by:  PROM, Gestational thrombocytopenia- resolved on admission.      PLAN:    Orders Placed This Encounter   Medications     hydrocortisone (ANUSOL-HC) 25 MG Suppository     Sig: Place 1 suppository (25 mg) rectally 2 times daily     Dispense:  28 suppository     Refill:  0        Postpartum education reviewed:  Signs and symptoms of postpartum depression/anxiety discussed and resources offered.  Discussed calcium intake, vitamins and supplements including Vitamin D. Continue prenatal " vitamin.   Exercise, high fiber, low fat diet, increased fluid intake, kegel exercises, routine breast self-exam encouraged.     - Reviewed contraception options. Desires to use condoms.   Discussed importance of consistent condom use for pregnancy prevention and use of emergency contraception if needed.  Reviewed recommendations for healthy pregnancy spacing.    - Prescription sent for annusol.     - Next pap with HPV cotesting due 9/2022.    LEANDRA Chicas, EDM

## 2018-06-07 NOTE — LETTER
"2018     RE: Susan Locke  [address]  [facility] [address]     Dear Colleague,    Thank you for referring your patient, Susan Locke, to the [facility] at [facility]. Please see a copy of my visit note below.      Nursing Notes:   Angelique Espinoza  2018  8:38 AM  Signed  SUBJECTIVE:   Susan Locke is here for her 6-week postpartum checkup.     PHQ-9 score:   Hx of Abuse:  No    Delivery Date: 18.    Delivering provider: Barrington MELVIN.    Type of delivery:  .  Perineum:  Labial tear repair.     Delivery complications: None  Infant gender:  girl, weight 7 pounds 3.3 oz.  Feeding Method:  .  Complications reported with feeding:  none, infant thriving .    Bleeding:  Heavy for the first few week then got lighter.  Duration:  4 weeks.  Menses resumed:  No  Bowel/Urinary problems:  Yes     Contraception Planned:  None -- is she planning pregnancy? No  She  has not had intercourse since delivery..       ============================================================Susan Locke is a 33 year old  s/p  on 18. Bilateral sub-labial lacerations repaired, QBL 264ml. Baby girl. Apgars 9, 9. Birth weight 7lb 3.3oz.     Patient states that she is doing well. Accompanied today by  and baby girl.  She is no longer having vaginal bleeding. Feels repair has healed well. No vaginal irritation or discomfort. Normal voiding. Had some constipation which is improving- used stool softeners and witch hazel pads when experiencing discomfort.    Baby girl is healthy and gaining weight. Breastfeeding well [id number]. Now over 10lbs.  Smiles and described baby as a \"little vampire.\" Has upcoming routine peds appointment.    Reports mood is stable. Both are tired but coping well. Feels well supported by spouse, family and friends. Her sister moved her last week and will be able to help.  currently on a 2 week break from " "graduate school. She will be returning to work mid July but will be working from home for a period of time.     Desires condoms for contraception. Has not had intercourse since delivery.     Last pap 17 NIL, negative HPV    ROS: 10 point ROS neg other than the symptoms noted above in the HPI.   CONSTITUTIONAL: negative  RESPIRATORY: negative  CARDIOVASCULAR: negative  GI: negative  : negative  MUSCULO-SKELETAL: negative  SKIN: negative  PSYCHIATRIC: negative    EXAM:  /70 (BP Location: Left arm, Patient Position: Chair)  Pulse 73  Ht 1.702 m (5' 7\")  Wt 80.7 kg (178 lb)  LMP 2017 (Exact Date)  BMI 27.88 kg/m2    General: healthy, alert and no distress  Psych: negative for sleep disturbance, anxiety, nervous breakdown, depression, thoughts of self-harm, thoughts of hurting someone else, agitation, hallucinations, sexual difficulties, marital problems, abusive relationship, excessive alcohol consumption and illegal drug usage  PHQ9=3 GAD7= 3    Breasts:  Lactating, Nipples intact with no lesions, Non-tender and No S/S of yeast or mastitis  Abdomen: Benign, Soft, flat, non-tender, No masses, organomegaly and Diastasis less than 1-2 FB  Incision:  None   Vulva:  Normal genitalia and Bartholin's, Urethra, [place]'s normal  Vagina:  normal with good muscle tone, without discharge and repair well healed  Cervix:  pink, moist, closed, without lesion or CMT.    Uterus:  fully involuted and non-tender    Adnexa:  Within normal limits and No masses, nodularity, tenderness  Recto-vaginal:   anus normal, no ext or int hemorrhoid  noted    ASSESSMENT:   Encounter Diagnosis   Name Primary?     Postpartum care and examination of lactating mother Yes      Normal postpartum exam after   Pregnancy was complicated by:  PROM, Gestational thrombocytopenia- resolved on admission.      PLAN:    Orders Placed This Encounter   Medications     hydrocortisone (ANUSOL-HC) 25 MG Suppository     Sig: Place 1 suppository " (25 mg) rectally 2 times daily     Dispense:  28 suppository     Refill:  0        Postpartum education reviewed:  Signs and symptoms of postpartum depression/anxiety discussed and resources offered.  Discussed calcium intake, vitamins and supplements including Vitamin D. Continue prenatal vitamin.   Exercise, high fiber, low fat diet, increased fluid intake, kegel exercises, routine breast self-exam encouraged.     - Reviewed contraception options. Desires to use condoms.   Discussed importance of consistent condom use for pregnancy prevention and use of emergency contraception if needed.  Reviewed recommendations for healthy pregnancy spacing.    - Prescription sent for annusol.     - Next pap with HPV cotesting due 9/2022.    LEANDRA Chicas, NGOZI

## 2018-06-07 NOTE — NURSING NOTE
SUBJECTIVE:   Susan Locke is here for her 6-week postpartum checkup.     PHQ-9 score:   Hx of Abuse:  No    Delivery Date: 18.    Delivering provider: Barrington MELVIN.    Type of delivery:  .  Perineum:  Labial tear repair.     Delivery complications: None  Infant gender:  girl, weight 7 pounds 3.3 oz.  Feeding Method:  .  Complications reported with feeding:  none, infant thriving .    Bleeding:  Heavy for the first few week then got lighter.  Duration:  4 weeks.  Menses resumed:  No  Bowel/Urinary problems:  Yes     Contraception Planned:  None -- is she planning pregnancy? No  She  has not had intercourse since delivery..

## 2019-01-06 ENCOUNTER — NURSE TRIAGE (OUTPATIENT)
Dept: NURSING | Facility: CLINIC | Age: 34
End: 2019-01-06

## 2019-01-07 NOTE — TELEPHONE ENCOUNTER
"Susan calls and says that she and her family had been in [place], for 2 weeks, and just got back yesterday. Pt. Says that she has the chills, a runny nose, is fatigued, and feels very weak. Pt. Says that she lost her thermometer, but has been taking fever medicine today, because she feels that she had a fever. Pt. Is not going to go to an ER tonight. Pt. Says that she will go to an  clinic tomorrow.    Reason for Disposition    Patient sounds very sick or weak to the triager    Additional Information    Negative: Severe difficulty breathing (e.g., struggling for each breath, speaks in single words)    Negative: Shock suspected (e.g., cold/pale/clammy skin, too weak to stand, low BP, rapid pulse)    Negative: Difficult to awaken or acting confused  (e.g., disoriented, slurred speech)    Negative: [1] Fainted > 15 minutes ago AND [2] still feels too weak or dizzy to stand    Negative: [1] SEVERE weakness (i.e., unable to walk or barely able to walk, requires support) AND     [2] new onset or worsening    Negative: Sounds like a life-threatening emergency to the triager    Negative: Weakness of the face, arm or leg on one side of the body    Negative: [1] Known diabetic AND [2] weakness from low blood sugar (i.e., < 60 mg/dl or 3.5 mmol/l)    Negative: Heat exhaustion suspected (i.e., dehydration from heat exposure)    Negative: Vomiting is main symptom    Negative: Diarrhea is main symptom    Negative: Difficulty breathing    Negative: Heart beating < 50 beats per minute OR > 140 beats per minute    Negative: Extra heart beats OR irregular heart beating   (i.e., \"palpitations\")    Negative: Follows bleeding (e.g., from vomiting, rectum, vagina; EXCEPTION: small brief weakness from sight of a small amount blood)    Negative: Black or tarry bowel movements    Negative: [1] Drinking very little AND [2] dehydration suspected (e.g., no urine > 12 hours, very dry mouth, very lightheaded)    Protocols used: WEAKNESS " (GENERALIZED) AND FATIGUE-ADULT-AH

## 2019-02-07 NOTE — PROGRESS NOTES
SUBJECTIVE:    32 year old, female, , 10w0d,  who presents to the clinic today for a new ob visit.    Feels well. Has  started PNV.  Estimated Date of Delivery:  is calculated from Patient's last menstrual period was 2017 (exact date)..  , c/w dating u/s.  She has not had bleeding since her LMP.   Nausea mildly improved. Only taking 25mg b6 per day, discussed dosage and adding unisom as desired. Weight loss has not occurred.     Reports some Mid to left lower back that radiate down leg c/w sciatic pain. Pt would like a physical therapy referral and referral for acupuncture.    This was a planned pregnancy.   FOB is involved,  Present at appointment. Walter.    x 12 years.    OTHER CONCERNS: Desire 1st trimester screening and level 2 u/s.    ===========================================  ROS  PSYCHIATRIC:  Denies mood changes, difficulty concentrating, depression, anxiety, panic attacks or post partum depression  PHQ9: Last PHQ-9 score on record= No Value exists for the : HP#PHQ9  Social History   Substance Use Topics     Smoking status: Never Smoker     Smokeless tobacco: Never Used     Alcohol use Yes     History   Drug Use No     History   Smoking Status     Never Smoker   Smokeless Tobacco     Never Used       Alcohol use Yes     Family History   Problem Relation Age of Onset     Hypothyroidism Mother      Lung Cancer Father      Stomach Cancer Maternal Grandmother      Thyroid Disease Sister      ============================================  MEDICAL HISTORY   No Known Allergies    [unfilled]      Current Outpatient Prescriptions:      VITAMIN D, CHOLECALCIFEROL, PO, Take 5,000 Units by mouth daily, Disp: , Rfl:      Pyridoxine HCl (VITAMIN B6 PO), Take 25 mg by mouth 3 times daily, Disp: , Rfl:      Prenatal Vit-Fe Fumarate-FA (PRENATAL VITAMIN PO), , Disp: , Rfl:      Emollient (CERAVE) CREA, Apply once daily to the trunk and extremities to damp skin (after  Patient showering and patting dry, not rubbing). (Patient not taking: Reported on 2017), Disp: 340 g, Rfl: 11    Past Medical History:   Diagnosis Date     Arthritis      Dermatitis 2017     H. pylori infection      Hay fever      Rheumatism        No past surgical history on file.         Obstetric History       T0      L0     SAB0   TAB0   Ectopic0   Multiple0   Live Births0       # Outcome Date GA Lbr Goyo/2nd Weight Sex Delivery Anes PTL Lv   1 Current                   GYN History- Last pap approx 3-4 years ago, no hx of abnormals    Needs pap with hpv cotesting today.    I personally reviewed the past social/family/medical and surgical history on the date of service.   I reviewed lab work done at Intake visit with patient.    OBJECTIVE:   PHYSICAL EXAM:  /77  Wt 72.6 kg (160 lb 1.6 oz)  LMP 2017 (Exact Date)  BMI 25.08 kg/m2  BMI- Body mass index is 25.08 kg/(m^2)., GENERAL:  Pleasant pregnant female, alert, cooperative  and well groomed.  SKIN:  Warm and dry, without lesions or rashes  HEAD: Symmetrical features.  MOUTH:  Buccal mucosa pink, moist without lesions.  Teeth in good repair.    NECK:  Thyroid without enlargement and nodules.  Lymph nodes not palpable.   LUNGS:  Clear to auscultation.  BREAST:    No dominant, fixed or suspicious masses are noted.  No skin or nipple changes or axillary nodes.   Nipples everted.      HEART:  RRR without murmur.  ABDOMEN: Soft without masses , tenderness or organomegaly.  No CVA tenderness.  Uterus palpable at size equal to dates.  No scars noted.. Fetal heart tones present via doppler, 175bpm.  MUSCULOSKELETAL:  Full range of motion  EXTREMITIES:  No edema. No significant varicosities.   PELVIC EXAM:  GENITALIA: EGBUS  External genitalia, Bartholin's glands, urethra & [place]'s glands:normal. Vulva reveals no erythema or lesions.         VAGINA:  pink, normal rugae and discharge, no lesions, good tone.   CERVIX:  smooth, without discharge  or CMT. Cervix visually closed on sse. Closed/long on bimanual.               UTERUS: nontender 10 week size.   ADNEXA:  Without masses or tenderness.   RECTAL:  Normal appearance.  Digital exam deferred.  WET PREP:Not done  GC/CHLAMYDIA CULTURE OBTAINED:YES  PAP with HPV cotesting: YES    ASSESSMENT:  Intrauterine pregnancy 10w0d size consistent with dates  Genetic Screening: First Trimester Screen, level 2 u/s    Encounter Diagnosis   Name Primary?     Encounter for supervision of normal first pregnancy in first trimester Yes        PLAN:  Orders Placed This Encounter   Procedures     Obtaining, preparing and conveyance of cervical or vaginal smear to laboratory.     Pap imaged thin layer screen with HPV - recommended age 30 - 65 years (select HPV order below)     HPV High Risk Types DNA Cervical     Physical Therapy Referral     Integrative Care  Referral - Acupuncture and Chinese Medicine     [facility] REFERRAL-PREGNANCY     Orders Placed This Encounter   Medications     VITAMIN D, CHOLECALCIFEROL, PO     Sig: Take 5,000 Units by mouth daily     Pyridoxine HCl (VITAMIN B6 PO)     Sig: Take 25 mg by mouth 3 times daily     - Reviewed use of triage nurse line and contacting the on-call provider after hours for an urgent need such as fever, vagina bleeding, bladder or vaginal infection, rupture of membranes,  or term labor.    - Reviewed best evidence for: weight gain for her weight and height for pregnancy:  RECOMMENDED WEIGHT GAIN: 25-35 lbs.   healthy diet and foods to avoid; exercise and activity during pregnancy;avoiding exposure to toxoplasmosis; and maintenance of a generally healthy lifestyle.   - Discussed the harms, benefits, side effects and alternative therapies for current prescribed and OTC medications.    - All pt's questions discussed and answered.  Pt verbalized understanding of and agreement to plan of care.     - Reviewed OBI labs.  - Continue vitamin d supplementation 5000  IU/day.  - Desires 1st trimester screening and level 2 ultrasound, MFM referral placed.  - Declined flu vaccine.  - Pap with hpv cotesting obtained.  - GC/CT collected.  - Physical therapy referral placed. Reviewed exercises/stretches, comfort measures for sciatic nerve pain.  - Acupuncture and CM referral provided.  - Reveiwed dosage and timing for vitamin b6 administration.  - Continue scheduled prenatal care, RTC in approx 4 weeks and prn if questions or concerns    LEANDRA Chicas, CNM

## 2019-08-23 ENCOUNTER — OFFICE VISIT (OUTPATIENT)
Dept: PEDIATRICS | Facility: CLINIC | Age: 34
End: 2019-08-23
Payer: COMMERCIAL

## 2019-08-23 VITALS
BODY MASS INDEX: 23.49 KG/M2 | OXYGEN SATURATION: 98 % | HEIGHT: 68 IN | DIASTOLIC BLOOD PRESSURE: 78 MMHG | TEMPERATURE: 98.9 F | HEART RATE: 61 BPM | SYSTOLIC BLOOD PRESSURE: 114 MMHG | WEIGHT: 155 LBS

## 2019-08-23 DIAGNOSIS — Z13.220 LIPID SCREENING: ICD-10-CM

## 2019-08-23 DIAGNOSIS — R79.89 LOW VITAMIN D LEVEL: ICD-10-CM

## 2019-08-23 DIAGNOSIS — D69.6 THROMBOCYTOPENIA (H): ICD-10-CM

## 2019-08-23 DIAGNOSIS — Z86.2 HISTORY OF ANEMIA: ICD-10-CM

## 2019-08-23 DIAGNOSIS — Z78.9 VEGETARIAN DIET: ICD-10-CM

## 2019-08-23 DIAGNOSIS — Z13.1 SCREENING FOR DIABETES MELLITUS: ICD-10-CM

## 2019-08-23 DIAGNOSIS — Z00.00 ROUTINE GENERAL MEDICAL EXAMINATION AT A HEALTH CARE FACILITY: Primary | ICD-10-CM

## 2019-08-23 PROBLEM — Z00.6 RESEARCH STUDY PATIENT: Status: RESOLVED | Noted: 2017-12-20 | Resolved: 2019-08-23

## 2019-08-23 LAB
CHOLEST SERPL-MCNC: 175 MG/DL
DEPRECATED CALCIDIOL+CALCIFEROL SERPL-MC: 24 UG/L (ref 20–75)
ERYTHROCYTE [DISTWIDTH] IN BLOOD BY AUTOMATED COUNT: 13.1 % (ref 10–15)
GLUCOSE SERPL-MCNC: 96 MG/DL (ref 70–99)
HCT VFR BLD AUTO: 39.2 % (ref 35–47)
HDLC SERPL-MCNC: 87 MG/DL
HGB BLD-MCNC: 13.1 G/DL (ref 11.7–15.7)
LDLC SERPL CALC-MCNC: 78 MG/DL
MCH RBC QN AUTO: 31.5 PG (ref 26.5–33)
MCHC RBC AUTO-ENTMCNC: 33.4 G/DL (ref 31.5–36.5)
MCV RBC AUTO: 94 FL (ref 78–100)
NONHDLC SERPL-MCNC: 88 MG/DL
PLATELET # BLD AUTO: 232 10E9/L (ref 150–450)
RBC # BLD AUTO: 4.16 10E12/L (ref 3.8–5.2)
TRIGL SERPL-MCNC: 48 MG/DL
VIT B12 SERPL-MCNC: 681 PG/ML (ref 193–986)
WBC # BLD AUTO: 5.1 10E9/L (ref 4–11)

## 2019-08-23 PROCEDURE — 80061 LIPID PANEL: CPT | Performed by: INTERNAL MEDICINE

## 2019-08-23 PROCEDURE — 36415 COLL VENOUS BLD VENIPUNCTURE: CPT | Performed by: INTERNAL MEDICINE

## 2019-08-23 PROCEDURE — 82607 VITAMIN B-12: CPT | Performed by: INTERNAL MEDICINE

## 2019-08-23 PROCEDURE — 99385 PREV VISIT NEW AGE 18-39: CPT | Performed by: INTERNAL MEDICINE

## 2019-08-23 PROCEDURE — 82306 VITAMIN D 25 HYDROXY: CPT | Performed by: INTERNAL MEDICINE

## 2019-08-23 PROCEDURE — 85027 COMPLETE CBC AUTOMATED: CPT | Performed by: INTERNAL MEDICINE

## 2019-08-23 PROCEDURE — 82947 ASSAY GLUCOSE BLOOD QUANT: CPT | Performed by: INTERNAL MEDICINE

## 2019-08-23 ASSESSMENT — MIFFLIN-ST. JEOR: SCORE: 1448.64

## 2019-08-23 NOTE — RESULT ENCOUNTER NOTE
Dear Susan,   Your recent test result are within acceptable range or at baseline. Please continue with your current plan of care.     -- B12 and vitamin D are not back yet.     Please call or Mychart to our office if you have further questions.     China Amanda MD-PhD

## 2019-08-23 NOTE — PATIENT INSTRUCTIONS
Make appointment(s) for:   -- get labs done today.         Preventive Health Recommendations  Female Ages 26 - 39  Yearly exam:   See your health care provider every year in order to    Review health changes.     Discuss preventive care.      Review your medicines if you your doctor has prescribed any.    Until age 30: Get a Pap test every three years (more often if you have had an abnormal result).    After age 30: Talk to your doctor about whether you should have a Pap test every 3 years or have a Pap test with HPV screening every 5 years.   You do not need a Pap test if your uterus was removed (hysterectomy) and you have not had cancer.  You should be tested each year for STDs (sexually transmitted diseases), if you're at risk.   Talk to your provider about how often to have your cholesterol checked.  If you are at risk for diabetes, you should have a diabetes test (fasting glucose).  Shots: Get a flu shot each year. Get a tetanus shot every 10 years.   Nutrition:     Eat at least 5 servings of fruits and vegetables each day.    Eat whole-grain bread, whole-wheat pasta and brown rice instead of white grains and rice.    Get adequate Calcium and Vitamin D.     Lifestyle    Exercise at least 150 minutes a week (30 minutes a day, 5 days of the week). This will help you control your weight and prevent disease.    Limit alcohol to one drink per day.    No smoking.     Wear sunscreen to prevent skin cancer.    See your dentist every six months for an exam and cleaning.

## 2019-08-23 NOTE — PROGRESS NOTES
SUBJECTIVE:   CC: Susan Locke is an 34 year old woman who presents for preventive health visit.     Healthy Habits:    Do you get at least three servings of calcium containing foods daily (dairy, green leafy vegetables, etc.)? no    Amount of exercise or daily activities, outside of work: 1 day(s) per week    Problems taking medications regularly No    Medication side effects: No    Have you had an eye exam in the past two years? no    Do you see a dentist twice per year? no    Do you have sleep apnea, excessive snoring or daytime drowsiness?no      HPI:  New patient to our clinic. Needs biometrics lipid and glucose.    History of anemia and thrombocytopenia.  Took iron supplement in the past.  She has a 16-year-old daughter.  She is doing nursing.  She wants to recheck her hemoglobin and platelet.    Since April of this year, she became a vegetarian.  She still is aches.  She is considering switching to fish as protein source.  She wants to get B12 checked.      History of low vitamin D, currently not taking vitamin D supplement.    Today's PHQ-2 Score:   PHQ-2 ( 1999 Pf[employer]) 8/23/2019 6/1/2017   Q1: Little interest or pleasure in doing things 0 0   Q2: Feeling down, depressed or hopeless 0 0   PHQ-2 Score 0 0   Q1: Little interest or pleasure in doing things - Not at all   Q2: Feeling down, depressed or hopeless - Not at all   PHQ-2 Score - 0       Abuse: Current or Past(Physical, Sexual or Emotional)- No  Do you feel safe in your environment? Yes    Social History     Tobacco Use     Smoking status: Never Smoker     Smokeless tobacco: Never Used   Substance Use Topics     Alcohol use: No     If you drink alcohol do you typically have >3 drinks per day or >7 drinks per week? No                     Reviewed orders with patient.  Reviewed health maintenance and updated orders accordingly - Yes  Lab work is in process    Mammogram not appropriate for this patient based on age.    Pertinent mammograms are  "reviewed under the imaging tab.  History of abnormal Pap smear: NO - age 30-65 PAP every 5 years with negative HPV co-testing recommended  PAP / HPV Latest Ref Rng & Units 9/27/2017   PAP - NIL   HPV 16 DNA NEG:Negative Negative   HPV 18 DNA NEG:Negative Negative   OTHER HR HPV NEG:Negative Negative     Reviewed and updated as needed this visit by clinical staff  Tobacco  Allergies  Meds  Med Hx  Surg Hx  Fam Hx  Soc Hx        Reviewed and updated as needed this visit by Provider            ROS:  CONSTITUTIONAL: NEGATIVE for fever, chills, change in weight  INTEGUMENTARU/SKIN: NEGATIVE for worrisome rashes, moles or lesions  EYES: NEGATIVE for vision changes or irritation  ENT: NEGATIVE for ear, mouth and throat problems  RESP: NEGATIVE for significant cough or SOB  CV: NEGATIVE for chest pain, palpitations or peripheral edema  GI: NEGATIVE for nausea, abdominal pain, heartburn, or change in bowel habits  : NEGATIVE for unusual urinary or vaginal symptoms. Periods are regular.  MUSCULOSKELETAL: NEGATIVE for significant arthralgias or myalgia  NEURO: NEGATIVE for weakness, dizziness or paresthesias  PSYCHIATRIC: NEGATIVE for changes in mood or affect    OBJECTIVE:   /78   Pulse 61   Temp 98.9  F (37.2  C) (Temporal)   Ht 1.715 m (5' 7.5\")   Wt 70.3 kg (155 lb)   SpO2 98%   BMI 23.92 kg/m    EXAM:  GENERAL: healthy, alert and no distress  EYES: Eyes grossly normal to inspection, PERRL and conjunctivae and sclerae normal  HENT: ear canals and TM's normal, nose and mouth without ulcers or lesions  NECK: no adenopathy, no asymmetry, masses, or scars and thyroid normal to palpation  RESP: lungs clear to auscultation - no rales, rhonchi or wheezes  CV: regular rate and rhythm, normal S1 S2, no S3 or S4, no murmur, click or rub, no peripheral edema and peripheral pulses strong  ABDOMEN: soft, nontender, no hepatosplenomegaly, no masses and bowel sounds normal  MS: no gross musculoskeletal defects noted, " "no edema  SKIN: no suspicious lesions or rashes  NEURO: Normal strength and tone, mentation intact and speech normal  PSYCH: mentation appears normal, affect normal/bright    Diagnostic Test Results:  Labs pending    ASSESSMENT/PLAN:       ICD-10-CM    1. Routine general medical examination at a health care facility Z00.00    2. Lipid screening Z13.220 Lipid panel reflex to direct LDL Fasting   3. Screening for diabetes mellitus Z13.1 **Glucose FUTURE anytime   4. History of anemia Z86.2 CBC with platelets   5. Low vitamin D level R79.89 Vitamin D Deficiency   6. Vegetarian diet Z78.9 Vitamin B12   7. Thrombocytopenia (H) D69.6 CBC with platelets       COUNSELING:   Reviewed preventive health counseling, as reflected in patient instructions    Estimated body mass index is 23.92 kg/m  as calculated from the following:    Height as of this encounter: 1.715 m (5' 7.5\").    Weight as of this encounter: 70.3 kg (155 lb).         reports that she has never smoked. She has never used smokeless tobacco.      Counseling Resources:  ATP IV Guidelines  Pooled Cohorts Equation Calculator  Breast Cancer Risk Calculator  FRAX Risk Assessment  ICSI Preventive Guidelines  Dietary Guidelines for Americans, 2010  [employer]s MyPlate  ASA Prophylaxis  Lung CA Screening    China Amanda MD PhD  [facility]  "

## 2019-08-24 NOTE — RESULT ENCOUNTER NOTE
Dear Susan,   Your recent test result are within acceptable range or at baseline. Please continue with your current plan of care.       Please call or Mychart to our office if you have further questions.     China Amanda MD-PhD

## 2020-03-10 ENCOUNTER — HEALTH MAINTENANCE LETTER (OUTPATIENT)
Age: 35
End: 2020-03-10

## 2020-12-27 ENCOUNTER — HEALTH MAINTENANCE LETTER (OUTPATIENT)
Age: 35
End: 2020-12-27

## 2021-04-06 NOTE — NURSING NOTE
Chief Complaint   Patient presents with     Prenatal Care     14w0d       See KANIKA Espinoza 10/25/2017                Susan Locke      1.  Has the patient received the information for the influenza vaccine? YES    2.  Does the patient have any of the following contraindications?     Allergy to eggs? No     Allergic reaction to previous influenza vaccines? No     Any other problems to previous influenza vaccines? No     Paralyzed by Guillain-Donnellson syndrome? No     Currently pregnant? Yes, 14 weeks gestation.     Current moderate or severe illness? No     Allergy to contact lens solution? No    3.  The vaccine has been administered in the usual fashion and the patient was instructed to wait 20 minutes before leaving the building in the event of an allergic reaction: YES    Vaccination given by See KANIKA Espinoza .  Recorded by Arcadio Espinoza       Neurology

## 2021-07-26 ENCOUNTER — OFFICE VISIT (OUTPATIENT)
Dept: FAMILY MEDICINE | Facility: CLINIC | Age: 36
End: 2021-07-26
Payer: COMMERCIAL

## 2021-07-26 VITALS
SYSTOLIC BLOOD PRESSURE: 113 MMHG | DIASTOLIC BLOOD PRESSURE: 74 MMHG | HEIGHT: 67 IN | BODY MASS INDEX: 25.27 KG/M2 | WEIGHT: 161 LBS | OXYGEN SATURATION: 97 % | HEART RATE: 64 BPM | TEMPERATURE: 98.4 F

## 2021-07-26 DIAGNOSIS — Z12.4 CERVICAL CANCER SCREENING: ICD-10-CM

## 2021-07-26 DIAGNOSIS — Z13.0 SCREENING FOR DEFICIENCY ANEMIA: ICD-10-CM

## 2021-07-26 DIAGNOSIS — Z80.9 FAMILY HISTORY OF CANCER: ICD-10-CM

## 2021-07-26 DIAGNOSIS — Z00.00 ROUTINE GENERAL MEDICAL EXAMINATION AT A HEALTH CARE FACILITY: Primary | ICD-10-CM

## 2021-07-26 DIAGNOSIS — Z83.49 FAMILY HISTORY OF THYROID DISEASE: ICD-10-CM

## 2021-07-26 DIAGNOSIS — R23.3 EASY BRUISABILITY: ICD-10-CM

## 2021-07-26 DIAGNOSIS — Z13.1 SCREENING FOR DIABETES MELLITUS (DM): ICD-10-CM

## 2021-07-26 DIAGNOSIS — D69.6 THROMBOCYTOPENIA (H): ICD-10-CM

## 2021-07-26 DIAGNOSIS — Z13.29 SCREENING FOR THYROID DISORDER: ICD-10-CM

## 2021-07-26 LAB
ALBUMIN SERPL-MCNC: 3.8 G/DL (ref 3.4–5)
ALP SERPL-CCNC: 93 U/L (ref 40–150)
ALT SERPL W P-5'-P-CCNC: 21 U/L (ref 0–50)
ANION GAP SERPL CALCULATED.3IONS-SCNC: 3 MMOL/L (ref 3–14)
AST SERPL W P-5'-P-CCNC: 16 U/L (ref 0–45)
BASOPHILS # BLD AUTO: 0 10E3/UL (ref 0–0.2)
BASOPHILS NFR BLD AUTO: 0 %
BILIRUB DIRECT SERPL-MCNC: <0.1 MG/DL (ref 0–0.2)
BILIRUB SERPL-MCNC: 0.2 MG/DL (ref 0.2–1.3)
BUN SERPL-MCNC: 17 MG/DL (ref 7–30)
CALCIUM SERPL-MCNC: 9.1 MG/DL (ref 8.5–10.1)
CHLORIDE BLD-SCNC: 108 MMOL/L (ref 94–109)
CO2 SERPL-SCNC: 30 MMOL/L (ref 20–32)
CREAT SERPL-MCNC: 0.8 MG/DL (ref 0.52–1.04)
EOSINOPHIL # BLD AUTO: 0.4 10E3/UL (ref 0–0.7)
EOSINOPHIL NFR BLD AUTO: 6 %
ERYTHROCYTE [DISTWIDTH] IN BLOOD BY AUTOMATED COUNT: 12.8 % (ref 10–15)
GFR SERPL CREATININE-BSD FRML MDRD: >90 ML/MIN/1.73M2
GLUCOSE BLD-MCNC: 91 MG/DL (ref 70–99)
HCT VFR BLD AUTO: 39.4 % (ref 35–47)
HGB BLD-MCNC: 12.9 G/DL (ref 11.7–15.7)
LYMPHOCYTES # BLD AUTO: 2.5 10E3/UL (ref 0.8–5.3)
LYMPHOCYTES NFR BLD AUTO: 33 %
MCH RBC QN AUTO: 31.2 PG (ref 26.5–33)
MCHC RBC AUTO-ENTMCNC: 32.7 G/DL (ref 31.5–36.5)
MCV RBC AUTO: 95 FL (ref 78–100)
MONOCYTES # BLD AUTO: 0.8 10E3/UL (ref 0–1.3)
MONOCYTES NFR BLD AUTO: 10 %
NEUTROPHILS # BLD AUTO: 3.8 10E3/UL (ref 1.6–8.3)
NEUTROPHILS NFR BLD AUTO: 51 %
PLATELET # BLD AUTO: 273 10E3/UL (ref 150–450)
POTASSIUM BLD-SCNC: 3.9 MMOL/L (ref 3.4–5.3)
PROT SERPL-MCNC: 6.8 G/DL (ref 6.8–8.8)
RBC # BLD AUTO: 4.13 10E6/UL (ref 3.8–5.2)
SODIUM SERPL-SCNC: 141 MMOL/L (ref 133–144)
TSH SERPL DL<=0.005 MIU/L-ACNC: 1.39 MU/L (ref 0.4–4)
WBC # BLD AUTO: 7.5 10E3/UL (ref 4–11)

## 2021-07-26 PROCEDURE — 99213 OFFICE O/P EST LOW 20 MIN: CPT | Mod: 25 | Performed by: FAMILY MEDICINE

## 2021-07-26 PROCEDURE — 82248 BILIRUBIN DIRECT: CPT | Performed by: FAMILY MEDICINE

## 2021-07-26 PROCEDURE — 36415 COLL VENOUS BLD VENIPUNCTURE: CPT | Performed by: FAMILY MEDICINE

## 2021-07-26 PROCEDURE — 99395 PREV VISIT EST AGE 18-39: CPT | Performed by: FAMILY MEDICINE

## 2021-07-26 PROCEDURE — 80050 GENERAL HEALTH PANEL: CPT | Performed by: FAMILY MEDICINE

## 2021-07-26 ASSESSMENT — ENCOUNTER SYMPTOMS
HEMATURIA: 0
MYALGIAS: 0
DYSURIA: 0
COUGH: 0
ARTHRALGIAS: 0
HEARTBURN: 0
FEVER: 0
CHILLS: 0
CONSTIPATION: 0
PARESTHESIAS: 0
SORE THROAT: 0
HEADACHES: 0
WEAKNESS: 0
ABDOMINAL PAIN: 0
DIZZINESS: 0
NAUSEA: 1
JOINT SWELLING: 0
PALPITATIONS: 0
HEMATOCHEZIA: 0
EYE PAIN: 0
NERVOUS/ANXIOUS: 0
SHORTNESS OF BREATH: 0
DIARRHEA: 0
FREQUENCY: 0

## 2021-07-26 ASSESSMENT — PAIN SCALES - GENERAL: PAINLEVEL: NO PAIN (0)

## 2021-07-26 ASSESSMENT — MIFFLIN-ST. JEOR: SCORE: 1463.04

## 2021-07-26 NOTE — PROGRESS NOTES
SUBJECTIVE:   CC: Susan Locke is an 36 year old woman who presents for preventive health visit.     {Split Bill scripting  The purpose of this visit is to discuss your medical history and prevent health problems before you are sick. You may be responsible for a co-pay, coinsurance, or deductible if your visit today includes services such as checking on a sore throat, having an x-ray or lab test, or treating and evaluating a new or existing condition :981823}  Patient has been advised of split billing requirements and indicates understanding: {Yes and No:055796}  HPI  {Add if <65 person on Medicare  - Required Questions (Optional):563663}  {Outside tests to abstract? :439619}    {additional problems to add (Optional):655383}    Today's PHQ-2 Score:   PHQ-2 ( 1999 Pfizer) 8/23/2019   Q1: Little interest or pleasure in doing things 0   Q2: Feeling down, depressed or hopeless 0   PHQ-2 Score 0   Q1: Little interest or pleasure in doing things -   Q2: Feeling down, depressed or hopeless -   PHQ-2 Score -       Abuse: Current or Past (Physical, Sexual or Emotional) - { :496826}  Do you feel safe in your environment? { :508713}    Have you ever done Advance Care Planning? (For example, a Health Directive, POLST, or a discussion with a medical provider or your loved ones about your wishes): { :440427}    Social History     Tobacco Use     Smoking status: Never Smoker     Smokeless tobacco: Never Used   Substance Use Topics     Alcohol use: No     {Rooming Staff- Complete this question if Prescreen response is not shown below for today's visit. If you drink alcohol do you typically have >3 drinks per day or >7 drinks per week? (Optional):987833}    No flowsheet data found.{add AUDIT responses (Optional) (A score of 7 for adult men is an indication of hazardous drinking; a score of 8 or more is an indication of an alcohol use disorder.  A score of 7 or more for adult women is an indication of hazardous drinking or an  "alchohol use disorder):753690}    Reviewed orders with patient.  Reviewed health maintenance and updated orders accordingly - { :809224::\"Yes\"}  {Chronicprobdata (optional):937909}    Breast Cancer Screening:  Any new diagnosis of family breast, ovarian, or bowel cancer? {Yes_Link to Screening / No:376395}    FHS-7: No flowsheet data found.  {If any of the questions to the BCRA (FHS-7) are answered yes, consider ordering referral for genetic counseling (Optional) :265948::\"click delete button to remove this line now\"}  {AMB Mammogram Decision Support (Optional) :416183}  Pertinent mammograms are reviewed under the imaging tab.    History of abnormal Pap smear: { :644473}  PAP / HPV Latest Ref Rng & Units 9/27/2017   PAP (Historical) - NIL   HPV16 NEG:Negative Negative   HPV18 NEG:Negative Negative   HRHPV NEG:Negative Negative     Reviewed and updated as needed this visit by clinical staff                 Reviewed and updated as needed this visit by Provider                {HISTORY OPTIONS (Optional):704423}    Review of Systems  {FEMALE ROS (Optional):189842}     OBJECTIVE:   There were no vitals taken for this visit.  Physical Exam  {Exam Choices (Optional):361230}    {Diagnostic Test Results (Optional):271930::\"Diagnostic Test Results:\",\"Labs reviewed in Epic\"}    ASSESSMENT/PLAN:   {Diag Picklist:995399}    Patient has been advised of split billing requirements and indicates understanding: {YES / NO:117691::\"Yes\"}  COUNSELING:  {FEMALE COUNSELING MESSAGES:049887::\"Reviewed preventive health counseling, as reflected in patient instructions\"}    Estimated body mass index is 23.92 kg/m  as calculated from the following:    Height as of 8/23/19: 1.715 m (5' 7.5\").    Weight as of 8/23/19: 70.3 kg (155 lb).    {Weight Management Plan (ACO) Complete if BMI is abnormal-  Ages 18-64  BMI >24.9.  Age 65+ with BMI <23 or >30 (Optional):811184}    She reports that she has never smoked. She has never used smokeless " tobacco.      Counseling Resources:  ATP IV Guidelines  Pooled Cohorts Equation Calculator  Breast Cancer Risk Calculator  BRCA-Related Cancer Risk Assessment: FHS-7 Tool  FRAX Risk Assessment  ICSI Preventive Guidelines  Dietary Guidelines for Americans, 2010  USDA's MyPlate  ASA Prophylaxis  Lung CA Screening    Pennie Jeffries MD  [facility]

## 2021-07-26 NOTE — PROGRESS NOTES
SUBJECTIVE:   CC: Susan Locke is an 36 year old woman who presents for preventive health visit.   Patient is new to the provider, is here for annual physical  Has family history of thyroid disease in mother and sister  Patient has family history of cancers including lung cancer in father, oral cancer in her uncle who recently passed away from Covid in [place] and liver cancer  Patient is wondering about her cancer risk  Has no family history of genitourinary cancers, breast or colon cancer  Patient is  2 para 2, currently breast-feeding, last childbirth-1 year ago  She is also is here with concerns of having easy bruising with minimal trauma  During her pregnancy year ago, she was found to have low platelets  Patient denies being on NSAIDs  Has no family history of bleeding diathesis  Denies abnormal vaginal bleeding or other abnormal bleeding patterns    Patient has been advised of split billing requirements and indicates understanding: Yes  Healthy Habits:     Getting at least 3 servings of Calcium per day:  Yes    Bi-annual eye exam:  NO    Dental care twice a year:  Yes    Sleep apnea or symptoms of sleep apnea:  None    Diet:  Regular (no restrictions)    Frequency of exercise:  6-7 days/week    Duration of exercise:  30-45 minutes    Taking medications regularly:  Not Applicable    Medication side effects:  Not applicable and None    PHQ-2 Total Score: 0    Additional concerns today:  Yes              Today's PHQ-2 Score:   PHQ-2 (  [employer]) 2021   Q1: Little interest or pleasure in doing things 0   Q2: Feeling down, depressed or hopeless 0   PHQ-2 Score 0   Q1: Little interest or pleasure in doing things Not at all   Q2: Feeling down, depressed or hopeless Not at all   PHQ-2 Score 0         Abuse: Current or Past (Physical, Sexual or Emotional) - No  Do you feel safe in your environment? Yes    Have you ever done Advance Care Planning? (For example, a Health Directive, POLST, or a  discussion with a medical provider or your loved ones about your wishes): No, advance care planning information given to patient to review.  Patient plans to discuss their wishes with loved ones or provider.      Social History     Tobacco Use     Smoking status: Never Smoker     Smokeless tobacco: Never Used   Substance Use Topics     Alcohol use: No     If you drink alcohol do you typically have >3 drinks per day or >7 drinks per week? No    Alcohol Use 7/26/2021   Prescreen: >3 drinks/day or >7 drinks/week? No   Prescreen: >3 drinks/day or >7 drinks/week? -   No flowsheet data found.    Reviewed orders with patient.  Reviewed health maintenance and updated orders accordingly - Yes  Lab work is in process  Labs reviewed in EPIC  BP Readings from Last 3 Encounters:   07/26/21 113/74   08/23/19 114/78   06/07/18 102/70    Wt Readings from Last 3 Encounters:   07/26/21 73 kg (161 lb)   08/23/19 70.3 kg (155 lb)   06/07/18 80.7 kg (178 lb)                  Patient Active Problem List   Diagnosis     Low vitamin D level     Herniated disc, cervical     Thrombocytopenia (H)     Vegetarian diet     History of anemia     No past surgical history on file.    Social History     Tobacco Use     Smoking status: Never Smoker     Smokeless tobacco: Never Used   Substance Use Topics     Alcohol use: No     Family History   Problem Relation Age of Onset     Hypothyroidism Mother      Lung Cancer Father      Stomach Cancer Maternal Grandmother      Thyroid Disease Sister          No current outpatient medications on file.     No Known Allergies  Recent Labs   Lab Test 08/23/19  0907 02/22/18  0947   LDL 78 87   HDL 87 95   TRIG 48 97        Breast Cancer Screening:    Breast CA Risk Assessment (FHS-7) 7/26/2021   Do you have a family history of breast, colon, or ovarian cancer? No / Unknown       click delete button to remove this line now  Patient under 40 years of age: Routine Mammogram Screening not recommended.   Pertinent  mammograms are reviewed under the imaging tab.    History of abnormal Pap smear: NO - age 30-65 PAP every 5 years with negative HPV co-testing recommended  PAP / HPV Latest Ref Rng & Units 2017   PAP (Historical) - NIL   HPV16 NEG:Negative Negative   HPV18 NEG:Negative Negative   HRHPV NEG:Negative Negative     Reviewed and updated as needed this visit by clinical staff   Allergies  Meds              Reviewed and updated as needed this visit by Provider                  Past Medical History:   Diagnosis Date     Arthritis      Dermatitis 2017     H. pylori infection      Hay fever      Rheumatism       No past surgical history on file.  OB History    Para Term  AB Living   1 1 1 0 0 1   SAB TAB Ectopic Multiple Live Births   0 0 0 0 1      # Outcome Date GA Lbr Goyo/2nd Weight Sex Delivery Anes PTL Lv   1 Term 18 40w1d 02:11 / 00:36 3.27 kg (7 lb 3.3 oz) F Vag-Spont EPI N JACE      Complications: Prolonged PROM (>18 hours)      Name: EL GODOY      Apgar1: 9  Apgar5: 9       CONSTITUTIONAL: NEGATIVE for fever, chills, change in weight  INTEGUMENTARU/SKIN: NEGATIVE for worrisome rashes, moles or lesions  EYES: NEGATIVE for vision changes or irritation  ENT: NEGATIVE for ear, mouth and throat problems  RESP: NEGATIVE for significant cough or SOB  BREAST: NEGATIVE for masses, tenderness or discharge  CV: NEGATIVE for chest pain, palpitations or peripheral edema  GI: NEGATIVE for nausea, abdominal pain, heartburn, or change in bowel habits  : NEGATIVE for unusual urinary or vaginal symptoms. Periods are regular.  MUSCULOSKELETAL: NEGATIVE for significant arthralgias or myalgia  NEURO: NEGATIVE for weakness, dizziness or paresthesias  ENDOCRINE: NEGATIVE for temperature intolerance, skin/hair changes  HEME/ALLERGY/IMMUNE: Easy bruising  PSYCHIATRIC: NEGATIVE for changes in mood or affect     OBJECTIVE:   /74 (BP Location: Right arm, Patient Position: Chair, Cuff Size:  "Adult Regular)   Pulse 64   Temp 98.4  F (36.9  C) (Oral)   Ht 1.71 m (5' 7.32\")   Wt 73 kg (161 lb)   SpO2 97%   BMI 24.97 kg/m    Physical Exam  GENERAL: healthy, alert and no distress  EYES: Eyes grossly normal to inspection, PERRL and conjunctivae and sclerae normal  HENT: ear canals and TM's normal, nose and mouth without ulcers or lesions  NECK: no adenopathy, no asymmetry, masses, or scars and thyroid normal to palpation  RESP: lungs clear to auscultation - no rales, rhonchi or wheezes  BREAST: Deferred, patient is currently breast-feeding  CV: regular rate and rhythm, normal S1 S2, no S3 or S4, no murmur, click or rub, no peripheral edema and peripheral pulses strong  ABDOMEN: soft, nontender, no hepatosplenomegaly, no masses and bowel sounds normal  MS: no gross musculoskeletal defects noted, no edema  SKIN: no suspicious lesions or rashes  NEURO: Normal strength and tone, mentation intact and speech normal  PSYCH: mentation appears normal, affect normal/bright    Diagnostic Test Results:  Labs reviewed in Epic    ASSESSMENT/PLAN:   1. Routine general medical examination at a health care facility  Discussed on regular exercises, healthy eating, self breast exams monthly and routine dental checks.        2. Family history of cancer  Recommended to consult genetic counselor for further evaluation due to family history of cancers including liver, lung and oral  - Cancer Risk Mgmt/Cancer Genetic Counseling Referral; Future    3. Thrombocytopenia (H)  Results for orders placed or performed in visit on 07/26/21   CBC with platelets and differential     Status: None   Result Value Ref Range    WBC Count 7.5 4.0 - 11.0 10e3/uL    RBC Count 4.13 3.80 - 5.20 10e6/uL    Hemoglobin 12.9 11.7 - 15.7 g/dL    Hematocrit 39.4 35.0 - 47.0 %    MCV 95 78 - 100 fL    MCH 31.2 26.5 - 33.0 pg    MCHC 32.7 31.5 - 36.5 g/dL    RDW 12.8 10.0 - 15.0 %    Platelet Count 273 150 - 450 10e3/uL    % Neutrophils 51 %    % " Lymphocytes 33 %    % Monocytes 10 %    % Eosinophils 6 %    % Basophils 0 %    Absolute Neutrophils 3.8 1.6 - 8.3 10e3/uL    Absolute Lymphocytes 2.5 0.8 - 5.3 10e3/uL    Absolute Monocytes 0.8 0.0 - 1.3 10e3/uL    Absolute Eosinophils 0.4 0.0 - 0.7 10e3/uL    Absolute Basophils 0.0 0.0 - 0.2 10e3/uL   CBC with platelets and differential     Status: None    Narrative    The following orders were created for panel order CBC with platelets and differential.  Procedure                               Abnormality         Status                     ---------                               -----------         ------                     CBC with platelets and d...[[id number]]                      Final result                 Please view results for these tests on the individual orders.     Reviewed normal CBC and platelets  - CBC with platelets and differential; Future  - Comprehensive metabolic panel (BMP + Alb, Alk Phos, ALT, AST, Total. Bili, TP); Future  - Comprehensive metabolic panel (BMP + Alb, Alk Phos, ALT, AST, Total. Bili, TP)  - CBC with platelets and differential    4. Easy bruisability  Results for orders placed or performed in visit on 07/26/21   Comprehensive metabolic panel (BMP + Alb, Alk Phos, ALT, AST, Total. Bili, TP)     Status: Normal   Result Value Ref Range    Sodium 141 133 - 144 mmol/L    Potassium 3.9 3.4 - 5.3 mmol/L    Chloride 108 94 - 109 mmol/L    Carbon Dioxide (CO2) 30 20 - 32 mmol/L    Anion Gap 3 3 - 14 mmol/L    Urea Nitrogen 17 7 - 30 mg/dL    Creatinine 0.80 0.52 - 1.04 mg/dL    Calcium 9.1 8.5 - 10.1 mg/dL    Glucose 91 70 - 99 mg/dL    Alkaline Phosphatase 93 40 - 150 U/L    AST 16 0 - 45 U/L    ALT 21 0 - 50 U/L    Protein Total 6.8 6.8 - 8.8 g/dL    Albumin 3.8 3.4 - 5.0 g/dL    Bilirubin Total 0.2 0.2 - 1.3 mg/dL    GFR Estimate >90 >60 mL/min/1.73m2   TSH with free T4 reflex     Status: Normal   Result Value Ref Range    TSH 1.39 0.40 - 4.00 mU/L   CBC with platelets and  differential     Status: None   Result Value Ref Range    WBC Count 7.5 4.0 - 11.0 10e3/uL    RBC Count 4.13 3.80 - 5.20 10e6/uL    Hemoglobin 12.9 11.7 - 15.7 g/dL    Hematocrit 39.4 35.0 - 47.0 %    MCV 95 78 - 100 fL    MCH 31.2 26.5 - 33.0 pg    MCHC 32.7 31.5 - 36.5 g/dL    RDW 12.8 10.0 - 15.0 %    Platelet Count 273 150 - 450 10e3/uL    % Neutrophils 51 %    % Lymphocytes 33 %    % Monocytes 10 %    % Eosinophils 6 %    % Basophils 0 %    Absolute Neutrophils 3.8 1.6 - 8.3 10e3/uL    Absolute Lymphocytes 2.5 0.8 - 5.3 10e3/uL    Absolute Monocytes 0.8 0.0 - 1.3 10e3/uL    Absolute Eosinophils 0.4 0.0 - 0.7 10e3/uL    Absolute Basophils 0.0 0.0 - 0.2 10e3/uL   Bilirubin direct     Status: Normal   Result Value Ref Range    Bilirubin Direct <0.1 0.0 - 0.2 mg/dL   CBC with platelets and differential     Status: None    Narrative    The following orders were created for panel order CBC with platelets and differential.  Procedure                               Abnormality         Status                     ---------                               -----------         ------                     CBC with platelets and d...[[id number]]                      Final result                 Please view results for these tests on the individual orders.       ddx-nonspecific/evaluate for thrombocytopenia given the previous history, evaluate for thyroid disorder and liver pathology  Reviewed normal CBC from today  - TSH with free T4 reflex; Future  - Hepatic panel (Albumin, ALT, AST, Bili, Alk Phos, TP); Future  - Comprehensive metabolic panel (BMP + Alb, Alk Phos, ALT, AST, Total. Bili, TP); Future  - Comprehensive metabolic panel (BMP + Alb, Alk Phos, ALT, AST, Total. Bili, TP)  - TSH with free T4 reflex  - Bilirubin direct    5. Family history of thyroid disease    - TSH with free T4 reflex; Future  - TSH with free T4 reflex    6. Screening for deficiency anemia    - CBC with platelets and differential; Future  - CBC with  "platelets and differential    7. Screening for thyroid disorder    - TSH with free T4 reflex; Future  - TSH with free T4 reflex    8. Screening for diabetes mellitus (DM)    - Comprehensive metabolic panel (BMP + Alb, Alk Phos, ALT, AST, Total. Bili, TP); Future  - Comprehensive metabolic panel (BMP + Alb, Alk Phos, ALT, AST, Total. Bili, TP)    9. Cervical cancer screening  Patient is not due for Pap until next year      Patient has been advised of split billing requirements and indicates understanding: Yes  COUNSELING:  Reviewed preventive health counseling, as reflected in patient instructions  Special attention given to:        Regular exercise       Healthy diet/nutrition       Vision screening       Contraception       Family planning       Folic Acid    Estimated body mass index is 24.97 kg/m  as calculated from the following:    Height as of this encounter: 1.71 m (5' 7.32\").    Weight as of this encounter: 73 kg (161 lb).        She reports that she has never smoked. She has never used smokeless tobacco.      Counseling Resources:  ATP IV Guidelines  Pooled Cohorts Equation Calculator  Breast Cancer Risk Calculator  BRCA-Related Cancer Risk Assessment: FHS-7 Tool  FRAX Risk Assessment  ICSI Preventive Guidelines  Dietary Guidelines for Americans, 2010  [employer]'s MyPlate  ASA Prophylaxis  Lung CA Screening    Pennie Jeffries MD  [facility]  Chart documentation done in part with Dragon Voice recognition Software. Although reviewed after completion, some word and grammatical error may remain.  Chart forwarded to PCP for FYI     Answers for HPI/ROS submitted by the patient on 7/26/2021  abdominal pain: No  Blood in stool: No  Blood in urine: No  chest pain: No  chills: No  congestion: No  constipation: No  cough: No  diarrhea: No  dizziness: No  ear pain: No  eye pain: No  nervous/anxious: No  fever: No  frequency: No  genital sores: No  headaches: No  hearing loss: No  heartburn: " No  arthralgias: No  joint swelling: No  peripheral edema: No  mood changes: No  myalgias: No  nausea: Yes  dysuria: No  palpitations: No  Skin sensation changes: No  sore throat: No  urgency: No  rash: No  shortness of breath: No  visual disturbance: Yes  weakness: No

## 2021-07-26 NOTE — PATIENT INSTRUCTIONS
[phone][facility] to initiate scheduling.  Preventive Health Recommendations  Female Ages 26 - 39  Yearly exam:   See your health care provider every year in order to    Review health changes.     Discuss preventive care.      Review your medicines if you your doctor has prescribed any.    Until age 30: Get a Pap test every three years (more often if you have had an abnormal result).    After age 30: Talk to your doctor about whether you should have a Pap test every 3 years or have a Pap test with HPV screening every 5 years.   You do not need a Pap test if your uterus was removed (hysterectomy) and you have not had cancer.  You should be tested each year for STDs (sexually transmitted diseases), if you're at risk.   Talk to your provider about how often to have your cholesterol checked.  If you are at risk for diabetes, you should have a diabetes test (fasting glucose).  Shots: Get a flu shot each year. Get a tetanus shot every 10 years.   Nutrition:     Eat at least 5 servings of fruits and vegetables each day.    Eat whole-grain bread, whole-wheat pasta and brown rice instead of white grains and rice.    Get adequate Calcium and Vitamin D.     Lifestyle    Exercise at least 150 minutes a week (30 minutes a day, 5 days of the week). This will help you control your weight and prevent disease.    Limit alcohol to one drink per day.    No smoking.     Wear sunscreen to prevent skin cancer.    See your dentist every six months for an exam and cleaning.

## 2021-07-27 NOTE — RESULT ENCOUNTER NOTE
Dear Susan,  Your lab test showed normal test results for platelets, blood counts and hemoglobin.  Your liver and kidney functions are in good range.  Your thyroid functions are normal  These are good and reassuring.   Let me know if you have any questions. Take care.  Pennie Jeffries MD

## 2021-09-07 ENCOUNTER — TELEPHONE (OUTPATIENT)
Dept: ONCOLOGY | Facility: CLINIC | Age: 36
End: 2021-09-07
Payer: COMMERCIAL

## 2021-09-07 NOTE — TELEPHONE ENCOUNTER
9/7/2021    Susan was scheduled for a genetic counseling video visit with me today. I sent her invitations to join the video by text message and email. I also attempted to reach her by phone twice and was unable to get in contact with her. I left her a voicemail with my phone number, as well as the scheduling team phone number if she wishes to reschedule her appointment.    Anny Bustamante MS  Genetic Counseling Intern  Ph: [phone]     Supervising Genetic Counselor  Mana Reed MS, [facility]  Genetic Counselor  Ph: [phone]

## 2021-10-09 ENCOUNTER — HEALTH MAINTENANCE LETTER (OUTPATIENT)
Age: 36
End: 2021-10-09

## 2022-02-17 PROBLEM — Z34.90 SUPERVISION OF NORMAL PREGNANCY: Status: RESOLVED | Noted: 2017-09-13 | Resolved: 2019-08-23

## 2022-09-11 ENCOUNTER — HEALTH MAINTENANCE LETTER (OUTPATIENT)
Age: 37
End: 2022-09-11

## 2024-05-04 ENCOUNTER — HEALTH MAINTENANCE LETTER (OUTPATIENT)
Age: 39
End: 2024-05-04

## 2024-10-22 ASSESSMENT — ANXIETY QUESTIONNAIRES: GAD7 TOTAL SCORE: 0

## 2025-02-12 ENCOUNTER — OFFICE VISIT (OUTPATIENT)
Dept: UROLOGY | Facility: CLINIC | Age: 40
End: 2025-02-12

## 2025-02-12 VITALS
HEART RATE: 90 BPM | SYSTOLIC BLOOD PRESSURE: 120 MMHG | HEIGHT: 67 IN | DIASTOLIC BLOOD PRESSURE: 82 MMHG | WEIGHT: 170 LBS | BODY MASS INDEX: 26.68 KG/M2 | OXYGEN SATURATION: 99 %

## 2025-02-12 DIAGNOSIS — N28.89 KIDNEY MASS: Primary | ICD-10-CM

## 2025-02-12 LAB
ALBUMIN UR-MCNC: NEGATIVE MG/DL
APPEARANCE UR: CLEAR
BILIRUB UR QL STRIP: NEGATIVE
COLOR UR AUTO: YELLOW
GLUCOSE UR STRIP-MCNC: NEGATIVE MG/DL
HGB UR QL STRIP: NEGATIVE
KETONES UR STRIP-MCNC: NEGATIVE MG/DL
LEUKOCYTE ESTERASE UR QL STRIP: ABNORMAL
NITRATE UR QL: NEGATIVE
PH UR STRIP: 7 [PH] (ref 5–7)
SP GR UR STRIP: 1.01 (ref 1–1.03)
UROBILINOGEN UR STRIP-ACNC: 0.2 E.U./DL

## 2025-02-12 PROCEDURE — 81003 URINALYSIS AUTO W/O SCOPE: CPT | Mod: QW | Performed by: UROLOGY

## 2025-02-12 PROCEDURE — 99204 OFFICE O/P NEW MOD 45 MIN: CPT | Performed by: UROLOGY

## 2025-02-12 ASSESSMENT — PAIN SCALES - GENERAL: PAINLEVEL_OUTOF10: MILD PAIN (3)

## 2025-02-12 NOTE — NURSING NOTE
Chief Complaint   Patient presents with    Kidney mass    Patient has had intermittent pain since her last visit to ER of 3-7 on the pain scale.  Ninoska Stacy LPN

## 2025-02-12 NOTE — PROGRESS NOTES
"Urology Consult History and Physical  [facility]   Name: Susan Locke    MRN: [id number]   YOB: 1985       We were asked to see Susan Locke at the request of Dr. Amanda for evaluation and treatment of Right renal cyst.        Chief Complaint:   Right renal cyst    History is obtained from the patient            History of Present Illness:   Susan Locke is a 39 year old female who is being seen for evaluation of RIGHT renal cyst    She is status post a ER visit on 1/24/2025 secondary to right-sided flank pain  CT scan at that time demonstrated-her known right renal cyst may have had a small rupture with some tracking of fluid subcapsularly  She has required intermittent Tylenol  Feeling better today           Past Medical History:     Past Medical History:   Diagnosis Date    Arthritis     Dermatitis 06/12/2017    H. pylori infection     Hay fever     Rheumatism             Past Surgical History:   History reviewed. No pertinent surgical history.         Social History:     Social History     Tobacco Use    Smoking status: Never    Smokeless tobacco: Never   Substance Use Topics    Alcohol use: No       History   Smoking Status    Never   Smokeless Tobacco    Never            Family History:     Family History   Problem Relation Age of Onset    Hypothyroidism Mother     Cancer Father     Lung Cancer Father     Stomach Cancer Maternal Grandmother     Thyroid Disease Sister               Allergies:     Allergies   Allergen Reactions    Oxycodone Itching     Patient does not want to take it due to anxiety also.            Medications:     No current outpatient medications on file.     No current facility-administered medications for this visit.             Review of Systems:     Reviewed and negative except as noted above          Physical Exam:   Patient Vitals for the past 24 hrs:   BP Pulse SpO2 Height Weight   02/12/25 1411 120/82 90 99 % 1.702 m (5' 7\") 77.1 kg (170 lb)     Body mass index " is 26.63 kg/m .     General: age-appropriate appearing female in NAD  HEENT: Head AT/NC, EOMI, CN Grossly intact  Lungs: no respiratory distress, or pursed lip breathing  Heart: No obvious jugular venous distension present  Back: no CVAT bilaterally.  Abdomen: non-distended  Musculoskeltal: extremities normal, no peripheral edema  Skin: no suspicious lesions or rashes  Neuro: Alert, oriented, speech and mentation normal;  moving all 4 extremities equally.  Psych: affect and mood normal          Data:   All laboratory data reviewed:    UA RESULTS:  Recent Labs   Lab Test 02/12/25  1408   COLOR Yellow   APPEARANCE Clear   URINEGLC Negative   URINEBILI Negative   URINEKETONE Negative   SG 1.015   UBLD Negative   URINEPH 7.0   PROTEIN Negative   UROBILINOGEN 0.2   NITRITE Negative   LEUKEST Trace*     CR:  1/24/2025 = 0.78 we will    IMAGING:  All pertinent imaging reviewed:    All imaging studies reviewed by me.  I personally reviewed these imaging films.  A formal report from radiology will follow.    FINDINGS:   LOWER CHEST: Normal.     HEPATOBILIARY: Normal.     PANCREAS: Normal.     SPLEEN: Normal.     ADRENAL GLANDS: Normal.     KIDNEYS/BLADDER: The left kidney appears normal. There is mild right perinephric   edema. There is a 2 cm cystic lesion within the lower pole of the right kidney,   which extends into the subcapsular region suggesting leak. Normal thickening or   enhancement suggest abscess. Symmetric nephrograms.     BOWEL: No free air, free fluid, or inflammatory change. No obstruction. Large   joint of stool throughout the colon. Normal appendix.     LYMPH NODES: Normal.     VASCULATURE: Normal.     PELVIC ORGANS: Normal.     MUSCULOSKELETAL: Normal.     IMPRESSION:   1.  Mild right perinephric edema with suggestion of ruptured 2 cm cyst in the   lower pole. No hydronephrosis or evidence for pyelonephritis.          Impression and Plan:   Impression:   39-year-old female with a right renal cyst with  possible rupture      Plan:   Right renal cyst  - I reviewed her outside labs with a normal and stable serum creatinine  - Her urinalysis today is unremarkable  - I reviewed the outside imaging reports and we will have these pushed into our system  - She had a known right renal cyst which was described as benign and simple in appearance on previous CT scans  - Most recent CT scan with description of possible cyst rupture  - We discussed that this may cause some pain and irritation surrounding the kidney which should gradually resolve with time  - Reassurance provided that this should be self-limited  - She will notify our office if she is continuing to have flank pain in the next 1 to 2 months and we could consider repeat imaging-otherwise, she may follow-up on a as needed basis     Thank you for the kind consultation.    Time spent: 20 minutes spent on the date of the encounter doing chart review, history and exam, documentation and further activities as noted above.    J Carlos Escalante MD   Urology  [facility]  [facility] Phone: [phone]  [facility] Phone: [phone]

## 2025-02-12 NOTE — LETTER
2/12/2025       RE: Susan Locke  [address]  [place] [address]     Dear Colleague,    Thank you for referring your patient, Susan Locke, to the [facility]. Please see a copy of my visit note below.    Urology Consult History and Physical  [facility]   Name: Susan Locke    MRN: [id number]   YOB: 1985       We were asked to see Susan Locke at the request of Dr. Amanda for evaluation and treatment of Right renal cyst.        Chief Complaint:   Right renal cyst    History is obtained from the patient            History of Present Illness:   Susan Locke is a 39 year old female who is being seen for evaluation of RIGHT renal cyst    She is status post a ER visit on 1/24/2025 secondary to right-sided flank pain  CT scan at that time demonstrated-her known right renal cyst may have had a small rupture with some tracking of fluid subcapsularly  She has required intermittent Tylenol  Feeling better today           Past Medical History:     Past Medical History:   Diagnosis Date     Arthritis      Dermatitis 06/12/2017     H. pylori infection      Hay fever      Rheumatism             Past Surgical History:   History reviewed. No pertinent surgical history.         Social History:     Social History     Tobacco Use     Smoking status: Never     Smokeless tobacco: Never   Substance Use Topics     Alcohol use: No       History   Smoking Status     Never   Smokeless Tobacco     Never            Family History:     Family History   Problem Relation Age of Onset     Hypothyroidism Mother      Cancer Father      Lung Cancer Father      Stomach Cancer Maternal Grandmother      Thyroid Disease Sister               Allergies:     Allergies   Allergen Reactions     Oxycodone Itching     Patient does not want to take it due to anxiety also.            Medications:     No current outpatient medications on  "file.     No current facility-administered medications for this visit.             Review of Systems:     Reviewed and negative except as noted above          Physical Exam:   Patient Vitals for the past 24 hrs:   BP Pulse SpO2 Height Weight   02/12/25 1411 120/82 90 99 % 1.702 m (5' 7\") 77.1 kg (170 lb)     Body mass index is 26.63 kg/m .     General: age-appropriate appearing female in NAD  HEENT: Head AT/NC, EOMI, CN Grossly intact  Lungs: no respiratory distress, or pursed lip breathing  Heart: No obvious jugular venous distension present  Back: no CVAT bilaterally.  Abdomen: non-distended  Musculoskeltal: extremities normal, no peripheral edema  Skin: no suspicious lesions or rashes  Neuro: Alert, oriented, speech and mentation normal;  moving all 4 extremities equally.  Psych: affect and mood normal          Data:   All laboratory data reviewed:    UA RESULTS:  Recent Labs   Lab Test 02/12/25  1408   COLOR Yellow   APPEARANCE Clear   URINEGLC Negative   URINEBILI Negative   URINEKETONE Negative   SG 1.015   UBLD Negative   URINEPH 7.0   PROTEIN Negative   UROBILINOGEN 0.2   NITRITE Negative   LEUKEST Trace*     CR:  1/24/2025 = 0.78 we will    IMAGING:  All pertinent imaging reviewed:    All imaging studies reviewed by me.  I personally reviewed these imaging films.  A formal report from radiology will follow.    FINDINGS:   LOWER CHEST: Normal.     HEPATOBILIARY: Normal.     PANCREAS: Normal.     SPLEEN: Normal.     ADRENAL GLANDS: Normal.     KIDNEYS/BLADDER: The left kidney appears normal. There is mild right perinephric   edema. There is a 2 cm cystic lesion within the lower pole of the right kidney,   which extends into the subcapsular region suggesting leak. Normal thickening or   enhancement suggest abscess. Symmetric nephrograms.     BOWEL: No free air, free fluid, or inflammatory change. No obstruction. Large   joint of stool throughout the colon. Normal appendix.     LYMPH NODES: Normal. "     VASCULATURE: Normal.     PELVIC ORGANS: Normal.     MUSCULOSKELETAL: Normal.     IMPRESSION:   1.  Mild right perinephric edema with suggestion of ruptured 2 cm cyst in the   lower pole. No hydronephrosis or evidence for pyelonephritis.          Impression and Plan:   Impression:   39-year-old female with a right renal cyst with possible rupture      Plan:   Right renal cyst  - I reviewed her outside labs with a normal and stable serum creatinine  - Her urinalysis today is unremarkable  - I reviewed the outside imaging reports and we will have these pushed into our system  - She had a known right renal cyst which was described as benign and simple in appearance on previous CT scans  - Most recent CT scan with description of possible cyst rupture  - We discussed that this may cause some pain and irritation surrounding the kidney which should gradually resolve with time  - Reassurance provided that this should be self-limited  - She will notify our office if she is continuing to have flank pain in the next 1 to 2 months and we could consider repeat imaging-otherwise, she may follow-up on a as needed basis     Thank you for the kind consultation.    Time spent: 20 minutes spent on the date of the encounter doing chart review, history and exam, documentation and further activities as noted above.    J Carlos Escalante MD   Urology  [facility]  [facility] Phone: [phone]  [facility] Phone: [phone]           Again, thank you for allowing me to participate in the care of your patient.      Sincerely,    J Carlos Escalante MD

## 2025-05-17 ENCOUNTER — HEALTH MAINTENANCE LETTER (OUTPATIENT)
Age: 40
End: 2025-05-17